# Patient Record
Sex: FEMALE | Race: WHITE | Employment: OTHER | URBAN - METROPOLITAN AREA
[De-identification: names, ages, dates, MRNs, and addresses within clinical notes are randomized per-mention and may not be internally consistent; named-entity substitution may affect disease eponyms.]

---

## 2017-02-16 RX ORDER — MONTELUKAST SODIUM 10 MG/1
TABLET ORAL
Qty: 90 TABLET | Refills: 0 | Status: SHIPPED | OUTPATIENT
Start: 2017-02-16 | End: 2017-05-01

## 2017-02-16 NOTE — TELEPHONE ENCOUNTER
Refill Protocol Appointment Criteria  · Appointment scheduled in the past 6 months or in the next 3 months  Recent Visits       Provider Department Primary Dx    3 months ago Regla Álvarez MD [facility], [facility], [address] [facility]

## 2017-03-02 ENCOUNTER — OFFICE VISIT (OUTPATIENT)
Dept: INTERNAL MEDICINE CLINIC | Facility: CLINIC | Age: 62
End: 2017-03-02

## 2017-03-02 VITALS
TEMPERATURE: 98 F | HEIGHT: 63 IN | HEART RATE: 66 BPM | SYSTOLIC BLOOD PRESSURE: 136 MMHG | DIASTOLIC BLOOD PRESSURE: 82 MMHG | BODY MASS INDEX: 29.23 KG/M2 | WEIGHT: 165 LBS

## 2017-03-02 DIAGNOSIS — J02.9 ACUTE PHARYNGITIS, UNSPECIFIED ETIOLOGY: Primary | ICD-10-CM

## 2017-03-02 LAB
CONTROL LINE PRESENT WITH A CLEAR BACKGROUND (YES/NO): YES YES/NO
KIT LOT #: NORMAL NUMERIC
STREP GRP A CUL-SCR: NEGATIVE

## 2017-03-02 PROCEDURE — 99213 OFFICE O/P EST LOW 20 MIN: CPT | Performed by: INTERNAL MEDICINE

## 2017-03-02 PROCEDURE — 87880 STREP A ASSAY W/OPTIC: CPT | Performed by: INTERNAL MEDICINE

## 2017-03-02 NOTE — PATIENT INSTRUCTIONS
Your Strep test today was negative. Treat symptoms with Tylenol and warm salt water gargles as needed. Call if not soon better.

## 2017-03-02 NOTE — PROGRESS NOTES
Tam Galloway is a 60year old female. Patient presents with:  Sore Throat    HPI:   Since yesterday, she has had a persistent sore throat. She just returned from a wedding in [place] yesterday, and wanted to come in to be checked.   She does have a Rash   Past Medical History   Diagnosis Date   • Asthma    • GERD (gastroesophageal reflux disease)    • S/P colonoscopy 11/11/2011   • Chickenpox    • Measles    • Mumps    • Breast cancer [facility]) 9/11/2007 9/19/07(right breast lumpectomy with sent

## 2017-03-29 ENCOUNTER — HOSPITAL ENCOUNTER (OUTPATIENT)
Dept: MAMMOGRAPHY | Facility: HOSPITAL | Age: 62
Discharge: HOME | End: 2017-03-29
Attending: INTERNAL MEDICINE
Payer: COMMERCIAL

## 2017-03-29 DIAGNOSIS — C50.911 MALIGNANT NEOPLASM OF RIGHT FEMALE BREAST, UNSPECIFIED SITE OF BREAST: ICD-10-CM

## 2017-03-29 PROCEDURE — 77062 BREAST TOMOSYNTHESIS BI: CPT | Performed by: RADIOLOGY

## 2017-03-29 PROCEDURE — 77066 DX MAMMO INCL CAD BI: CPT | Performed by: RADIOLOGY

## 2017-05-06 RX ORDER — PANTOPRAZOLE SODIUM 40 MG/1
TABLET, DELAYED RELEASE ORAL
Qty: 90 TABLET | Refills: 0 | Status: SHIPPED | OUTPATIENT
Start: 2017-05-06 | End: 2017-07-10

## 2017-05-06 RX ORDER — MONTELUKAST SODIUM 10 MG/1
TABLET ORAL
Qty: 90 TABLET | Refills: 0 | Status: SHIPPED | OUTPATIENT
Start: 2017-05-06 | End: 2017-07-10

## 2017-05-06 NOTE — TELEPHONE ENCOUNTER
Refill protocol criteria met, rx sent.       Refill Protocol Appointment Criteria  · Appointment scheduled in the past 6 months or in the next 3 months  Recent Visits       Provider Department Primary Dx    2 months ago Yazmin Rivers MD [facility], [facility]

## 2017-05-06 NOTE — TELEPHONE ENCOUNTER
Refill protocol criteria met, rx sent.       Refill Protocol Appointment Criteria  · Appointment scheduled in the past 12 months or in the next 3 months  Recent Visits       Provider Department Primary Dx    2 months ago Eugenio Rushing MD [facility], [facility],

## 2017-07-10 ENCOUNTER — TELEPHONE (OUTPATIENT)
Dept: HEMATOLOGY/ONCOLOGY | Facility: HOSPITAL | Age: 62
End: 2017-07-10

## 2017-07-10 NOTE — TELEPHONE ENCOUNTER
Hemant Rayo is transferring from [facility] to [place], but her lab orders . Can you please write a new orders.

## 2017-07-10 NOTE — TELEPHONE ENCOUNTER
Pt returned call and discussed if Dr. Marilee Michaels wants labs to be drawn they will be ordered at 8/31/17 appointment and can be drawn the day of the appointment. Pt verbalizes understanding.

## 2017-07-12 RX ORDER — PANTOPRAZOLE SODIUM 40 MG/1
TABLET, DELAYED RELEASE ORAL
Qty: 90 TABLET | Refills: 0 | Status: SHIPPED | OUTPATIENT
Start: 2017-07-12 | End: 2017-09-18

## 2017-07-12 RX ORDER — MONTELUKAST SODIUM 10 MG/1
TABLET ORAL
Qty: 90 TABLET | Refills: 0 | Status: SHIPPED | OUTPATIENT
Start: 2017-07-12 | End: 2017-09-18

## 2017-07-19 ENCOUNTER — TELEPHONE (OUTPATIENT)
Dept: INTERNAL MEDICINE CLINIC | Facility: CLINIC | Age: 62
End: 2017-07-19

## 2017-07-19 DIAGNOSIS — Z85.3 HX OF BREAST CANCER: Primary | ICD-10-CM

## 2017-07-19 DIAGNOSIS — C44.90 MALIGNANT NEOPLASM OF SKIN: ICD-10-CM

## 2017-07-19 NOTE — TELEPHONE ENCOUNTER
Referral for both Dr. Fidencio Linn and Dr. Amarilis Hope was generated and signed per last office visit of each stating the return for yearly exam. Pt was contacted, informed and understanding was voiced.

## 2017-07-19 NOTE — TELEPHONE ENCOUNTER
Pt is requesting referrals. Pt has appt on 08/31 wit Dr. Conchita Walker for yearly follow up for breast cancer. Pt has appt on 9/28 with Dr. Washington Talavera for a full body skin check.     Call back # [phone]

## 2017-08-26 ENCOUNTER — LAB ENCOUNTER (OUTPATIENT)
Dept: LAB | Facility: HOSPITAL | Age: 62
End: 2017-08-26
Attending: INTERNAL MEDICINE
Payer: COMMERCIAL

## 2017-08-26 DIAGNOSIS — J45.20 MILD INTERMITTENT ASTHMA WITHOUT COMPLICATION: ICD-10-CM

## 2017-08-26 DIAGNOSIS — E78.00 PURE HYPERCHOLESTEROLEMIA: ICD-10-CM

## 2017-08-26 LAB
ALBUMIN SERPL BCP-MCNC: 3.9 G/DL (ref 3.5–4.8)
ALBUMIN/GLOB SERPL: 1.3 {RATIO} (ref 1–2)
ALP SERPL-CCNC: 67 U/L (ref 32–100)
ALT SERPL-CCNC: 18 U/L (ref 14–54)
ANION GAP SERPL CALC-SCNC: 5 MMOL/L (ref 0–18)
AST SERPL-CCNC: 26 U/L (ref 15–41)
BASOPHILS # BLD: 0 K/UL (ref 0–0.2)
BASOPHILS NFR BLD: 1 %
BILIRUB SERPL-MCNC: 0.7 MG/DL (ref 0.3–1.2)
BILIRUB UR QL: NEGATIVE
BUN SERPL-MCNC: 16 MG/DL (ref 8–20)
BUN/CREAT SERPL: 20.3 (ref 10–20)
CALCIUM SERPL-MCNC: 9.3 MG/DL (ref 8.5–10.5)
CHLORIDE SERPL-SCNC: 108 MMOL/L (ref 95–110)
CHOLEST SERPL-MCNC: 217 MG/DL (ref 110–200)
CO2 SERPL-SCNC: 27 MMOL/L (ref 22–32)
COLOR UR: YELLOW
CREAT SERPL-MCNC: 0.79 MG/DL (ref 0.5–1.5)
EOSINOPHIL # BLD: 0.2 K/UL (ref 0–0.7)
EOSINOPHIL NFR BLD: 4 %
ERYTHROCYTE [DISTWIDTH] IN BLOOD BY AUTOMATED COUNT: 13 % (ref 11–15)
GLOBULIN PLAS-MCNC: 3 G/DL (ref 2.5–3.7)
GLUCOSE SERPL-MCNC: 103 MG/DL (ref 70–99)
GLUCOSE UR-MCNC: NEGATIVE MG/DL
HCT VFR BLD AUTO: 38.9 % (ref 35–48)
HDLC SERPL-MCNC: 75 MG/DL
HGB BLD-MCNC: 12.9 G/DL (ref 12–16)
HGB UR QL STRIP.AUTO: NEGATIVE
KETONES UR-MCNC: NEGATIVE MG/DL
LDLC SERPL CALC-MCNC: 124 MG/DL (ref 0–99)
LYMPHOCYTES # BLD: 1.7 K/UL (ref 1–4)
LYMPHOCYTES NFR BLD: 35 %
MCH RBC QN AUTO: 31 PG (ref 27–32)
MCHC RBC AUTO-ENTMCNC: 33.2 G/DL (ref 32–37)
MCV RBC AUTO: 93.4 FL (ref 80–100)
MONOCYTES # BLD: 0.4 K/UL (ref 0–1)
MONOCYTES NFR BLD: 8 %
NEUTROPHILS # BLD AUTO: 2.5 K/UL (ref 1.8–7.7)
NEUTROPHILS NFR BLD: 51 %
NITRITE UR QL STRIP.AUTO: NEGATIVE
NONHDLC SERPL-MCNC: 142 MG/DL
OSMOLALITY UR CALC.SUM OF ELEC: 291 MOSM/KG (ref 275–295)
PH UR: 5 [PH] (ref 5–8)
PLATELET # BLD AUTO: 214 K/UL (ref 140–400)
PMV BLD AUTO: 10.6 FL (ref 7.4–10.3)
POTASSIUM SERPL-SCNC: 4.3 MMOL/L (ref 3.3–5.1)
PROT SERPL-MCNC: 6.9 G/DL (ref 5.9–8.4)
PROT UR-MCNC: NEGATIVE MG/DL
RBC # BLD AUTO: 4.16 M/UL (ref 3.7–5.4)
RBC #/AREA URNS AUTO: 8 /HPF
SODIUM SERPL-SCNC: 140 MMOL/L (ref 136–144)
SP GR UR STRIP: 1.02 (ref 1–1.03)
TRIGL SERPL-MCNC: 88 MG/DL (ref 1–149)
TSH SERPL-ACNC: 1.16 UIU/ML (ref 0.45–5.33)
UROBILINOGEN UR STRIP-ACNC: <2
VIT C UR-MCNC: NEGATIVE MG/DL
WBC # BLD AUTO: 4.9 K/UL (ref 4–11)
WBC #/AREA URNS AUTO: 75 /HPF

## 2017-08-26 PROCEDURE — 80061 LIPID PANEL: CPT

## 2017-08-26 PROCEDURE — 36415 COLL VENOUS BLD VENIPUNCTURE: CPT

## 2017-08-26 PROCEDURE — 85025 COMPLETE CBC W/AUTO DIFF WBC: CPT

## 2017-08-26 PROCEDURE — 84443 ASSAY THYROID STIM HORMONE: CPT

## 2017-08-26 PROCEDURE — 81001 URINALYSIS AUTO W/SCOPE: CPT

## 2017-08-26 PROCEDURE — 80053 COMPREHEN METABOLIC PANEL: CPT

## 2017-08-31 ENCOUNTER — OFFICE VISIT (OUTPATIENT)
Dept: HEMATOLOGY/ONCOLOGY | Facility: HOSPITAL | Age: 62
End: 2017-08-31
Attending: INTERNAL MEDICINE
Payer: COMMERCIAL

## 2017-08-31 VITALS
BODY MASS INDEX: 29.41 KG/M2 | HEIGHT: 63 IN | WEIGHT: 166 LBS | SYSTOLIC BLOOD PRESSURE: 140 MMHG | RESPIRATION RATE: 16 BRPM | TEMPERATURE: 98 F | HEART RATE: 69 BPM | DIASTOLIC BLOOD PRESSURE: 61 MMHG

## 2017-08-31 DIAGNOSIS — Z12.31 SCREENING MAMMOGRAM, ENCOUNTER FOR: ICD-10-CM

## 2017-08-31 DIAGNOSIS — R92.2 DENSE BREASTS: ICD-10-CM

## 2017-08-31 DIAGNOSIS — Z17.1 MALIGNANT NEOPLASM OF RIGHT BREAST IN FEMALE, ESTROGEN RECEPTOR NEGATIVE, UNSPECIFIED SITE OF BREAST (HCC): Primary | ICD-10-CM

## 2017-08-31 DIAGNOSIS — C50.911 MALIGNANT NEOPLASM OF RIGHT BREAST IN FEMALE, ESTROGEN RECEPTOR NEGATIVE, UNSPECIFIED SITE OF BREAST (HCC): Primary | ICD-10-CM

## 2017-08-31 PROBLEM — R92.30 DENSE BREASTS: Status: ACTIVE | Noted: 2017-08-31

## 2017-08-31 PROCEDURE — 99212 OFFICE O/P EST SF 10 MIN: CPT | Performed by: INTERNAL MEDICINE

## 2017-08-31 PROCEDURE — 99213 OFFICE O/P EST LOW 20 MIN: CPT | Performed by: INTERNAL MEDICINE

## 2017-08-31 NOTE — PROGRESS NOTES
HPI     Hao Kirkpatrick is a 60year old female here for f/u of Malignant neoplasm of right breast in female, estrogen receptor negative, unspecified site of breast (hcc)  (primary encounter diagnosis)     States she feels well other than seasonal allergi Psychiatric/Behavioral: Negative for sleep disturbance. The patient is not nervous/anxious. Negative depression.              Current Outpatient Prescriptions:  PANTOPRAZOLE SODIUM 40 MG Oral Tab EC Take 1 tablet by mouth  every morning before  elizabeth 2007: BIOPSY OF BREAST, NEEDLE CORE Right      Comment: 07 (core biopsy) Poorly differentiated                infiltrating ductal carcinoma R breast (triple                negative, Ki   67,  86%)   No date:   2008: CHEMOTHERAPY      Com 10/27/16 : 73.9 kg (163 lb)  09/03/16 : 72.8 kg (160 lb 9.6 oz)  08/23/16 : 72.6 kg (160 lb)  09/23/15 : 71.2 kg (157 lb)    Physical Exam   Constitutional: She is oriented to person, place, and time.  Vital signs are normal. She appears well-developed and Neurological: She is alert and oriented to person, place, and time. Gait normal.   Skin: Skin is warm. No rash noted. No erythema. Psychiatric: She has a normal mood and affect.  Her behavior is normal. Judgment and thought content normal.   Nursing TECHNIQUE:              Full-field direct digital diagnostic mammography was performed and images were reviewed with the R2 NATHAN [de-identified] CAD device. 3D tomosynthesis was performed and reviewed.       BREAST COMPOSITION:                     CATEGORY c - The b markers locate right upper outer quadrant lumpectomy and lymph node  dissection sites.     Parenchymal pattern is stable.  There is post treatment architectural  distortion in the right upper-outer quadrant, with overlying breast  deformity and skin thicken

## 2017-09-06 ENCOUNTER — OFFICE VISIT (OUTPATIENT)
Dept: INTERNAL MEDICINE CLINIC | Facility: CLINIC | Age: 62
End: 2017-09-06

## 2017-09-06 VITALS
BODY MASS INDEX: 29.77 KG/M2 | RESPIRATION RATE: 20 BRPM | DIASTOLIC BLOOD PRESSURE: 78 MMHG | SYSTOLIC BLOOD PRESSURE: 138 MMHG | TEMPERATURE: 98 F | WEIGHT: 168 LBS | HEIGHT: 63 IN | HEART RATE: 63 BPM

## 2017-09-06 DIAGNOSIS — L08.9 INFECTED SEBACEOUS CYST: Primary | ICD-10-CM

## 2017-09-06 DIAGNOSIS — L72.3 INFECTED SEBACEOUS CYST: Primary | ICD-10-CM

## 2017-09-06 PROCEDURE — 99214 OFFICE O/P EST MOD 30 MIN: CPT | Performed by: INTERNAL MEDICINE

## 2017-09-06 PROCEDURE — 99212 OFFICE O/P EST SF 10 MIN: CPT | Performed by: INTERNAL MEDICINE

## 2017-09-06 RX ORDER — CLINDAMYCIN HYDROCHLORIDE 300 MG/1
300 CAPSULE ORAL 3 TIMES DAILY
Qty: 21 CAPSULE | Refills: 0 | Status: SHIPPED | OUTPATIENT
Start: 2017-09-06 | End: 2017-09-07

## 2017-09-06 RX ORDER — SACCHAROMYCES BOULARDII 250 MG
250 CAPSULE ORAL 2 TIMES DAILY
Qty: 14 CAPSULE | Refills: 0 | Status: SHIPPED | OUTPATIENT
Start: 2017-09-06 | End: 2017-09-18

## 2017-09-06 NOTE — PROGRESS NOTES
HPI:    Patient ID: Sukh Garcia is a 60year old female. Skin   This is a new (lump in  posterior right  neck  area ) problem. Episode onset: few  weeks   now   tender  and     red  -  The problem occurs daily.  The problem has been gradually worsen MONTELUKAST SODIUM 10 MG Oral Tab Take 1 tablet by mouth  daily Disp: 90 tablet Rfl: 0   Albuterol Sulfate HFA (PROAIR HFA) 108 (90 BASE) MCG/ACT Inhalation Aero Soln Inhale 2 puffs into the lungs every 4 (four) hours as needed for Wheezing.  Disp: 1 Inhale Pulmonary/Chest: Effort normal and breath sounds normal. No respiratory distress. She has no wheezes. She has no rales. Abdominal: Soft. She exhibits no mass. There is no hepatosplenomegaly. There is no tenderness. There is no CVA tenderness.    [address]

## 2017-09-07 ENCOUNTER — TELEPHONE (OUTPATIENT)
Dept: OTHER | Age: 62
End: 2017-09-07

## 2017-09-07 RX ORDER — AMOXICILLIN AND CLAVULANATE POTASSIUM 875; 125 MG/1; MG/1
1 TABLET, FILM COATED ORAL 2 TIMES DAILY
Qty: 20 TABLET | Refills: 0 | Status: SHIPPED | OUTPATIENT
Start: 2017-09-07 | End: 2017-09-17

## 2017-09-07 NOTE — TELEPHONE ENCOUNTER
Pt stts was given Clindamycin for cyst yesterday. Took 1 dose but  has not felt well after taking medication. Pt has increased belching and nausea. Pt wondering if you can change to something else please.

## 2017-09-07 NOTE — TELEPHONE ENCOUNTER
discussed with pt  clinda   Causing stomach issues  Belching  Nausea     -   Antibiotic  Changed  -  amoxicillin bid - with   Some food   - pt  State  Was  Taking in past  Without  Any problems - tolerated well in past .  Fluids   Call if  Any problems

## 2017-09-11 RX ORDER — PANTOPRAZOLE SODIUM 40 MG/1
TABLET, DELAYED RELEASE ORAL
Qty: 90 TABLET | OUTPATIENT
Start: 2017-09-11

## 2017-09-11 RX ORDER — MONTELUKAST SODIUM 10 MG/1
TABLET ORAL
Qty: 90 TABLET | OUTPATIENT
Start: 2017-09-11

## 2017-09-18 ENCOUNTER — OFFICE VISIT (OUTPATIENT)
Dept: INTERNAL MEDICINE CLINIC | Facility: CLINIC | Age: 62
End: 2017-09-18

## 2017-09-18 VITALS
SYSTOLIC BLOOD PRESSURE: 128 MMHG | DIASTOLIC BLOOD PRESSURE: 83 MMHG | HEIGHT: 63 IN | RESPIRATION RATE: 20 BRPM | BODY MASS INDEX: 29.06 KG/M2 | WEIGHT: 164 LBS | TEMPERATURE: 98 F | HEART RATE: 66 BPM

## 2017-09-18 DIAGNOSIS — L72.3 INFECTED SEBACEOUS CYST: ICD-10-CM

## 2017-09-18 DIAGNOSIS — L08.9 INFECTED SEBACEOUS CYST: ICD-10-CM

## 2017-09-18 DIAGNOSIS — E55.9 VITAMIN D DEFICIENCY: Primary | ICD-10-CM

## 2017-09-18 PROCEDURE — 99212 OFFICE O/P EST SF 10 MIN: CPT | Performed by: INTERNAL MEDICINE

## 2017-09-18 PROCEDURE — 99214 OFFICE O/P EST MOD 30 MIN: CPT | Performed by: INTERNAL MEDICINE

## 2017-09-18 RX ORDER — PANTOPRAZOLE SODIUM 40 MG/1
TABLET, DELAYED RELEASE ORAL
Qty: 90 TABLET | Refills: 1 | Status: SHIPPED | OUTPATIENT
Start: 2017-09-18 | End: 2018-03-27

## 2017-09-18 RX ORDER — MONTELUKAST SODIUM 10 MG/1
10 TABLET ORAL
Qty: 90 TABLET | Refills: 1 | Status: SHIPPED | OUTPATIENT
Start: 2017-09-18 | End: 2018-03-29

## 2017-09-18 NOTE — PROGRESS NOTES
HPI:    Patient ID: Kaleigh Armando is a 60year old female. Medication Request   This is a new (small infected lump  in neck srea  is  much  better  and  smaller  ) problem. The current episode started in the past 7 days.  The problem has been graduall Calcium Carbonate-Vitamin D (CALCIUM 500/D) 500-200 MG-UNIT Oral Tab Take 1 tablet by mouth daily. Disp:  Rfl:    Vitamin D3 (VITAMIN D3) 2000 UNITS Oral Cap Take 2,000 Units by mouth daily.    Disp:  Rfl:    Olopatadine HCl (PATADAY) 0.2 % Ophthalmic Sol Neurological: She is alert and oriented to person, place, and time. Skin: Skin is warm and dry.         Small  3 mm  Now Round  Soft  - cystic  typel lesion -  occipital  Area - No  Erythema  No tenerenss  No discharge-  r Post  Upper  Neck    Psychiatric

## 2017-09-28 ENCOUNTER — OFFICE VISIT (OUTPATIENT)
Dept: DERMATOLOGY CLINIC | Facility: CLINIC | Age: 62
End: 2017-09-28

## 2017-09-28 DIAGNOSIS — D23.60 BENIGN NEOPLASM OF SKIN OF UPPER LIMB, INCLUDING SHOULDER, UNSPECIFIED LATERALITY: ICD-10-CM

## 2017-09-28 DIAGNOSIS — D23.4 BENIGN NEOPLASM OF SCALP AND SKIN OF NECK: ICD-10-CM

## 2017-09-28 DIAGNOSIS — D23.30 BENIGN NEOPLASM OF SKIN OF FACE: ICD-10-CM

## 2017-09-28 DIAGNOSIS — L81.4 SOLAR LENTIGO: ICD-10-CM

## 2017-09-28 DIAGNOSIS — D23.70 BENIGN NEOPLASM OF SKIN OF LOWER LIMB, INCLUDING HIP, UNSPECIFIED LATERALITY: ICD-10-CM

## 2017-09-28 DIAGNOSIS — D23.5 BENIGN NEOPLASM OF SKIN OF TRUNK, EXCEPT SCROTUM: ICD-10-CM

## 2017-09-28 DIAGNOSIS — L82.1 SEBORRHEIC KERATOSES: ICD-10-CM

## 2017-09-28 DIAGNOSIS — Z85.828 PERSONAL HISTORY OF SKIN CANCER: Primary | ICD-10-CM

## 2017-09-28 PROCEDURE — 99213 OFFICE O/P EST LOW 20 MIN: CPT | Performed by: DERMATOLOGY

## 2017-09-28 PROCEDURE — 99212 OFFICE O/P EST SF 10 MIN: CPT | Performed by: DERMATOLOGY

## 2017-09-28 RX ORDER — CETIRIZINE HYDROCHLORIDE 5 MG/1
10 TABLET ORAL DAILY
COMMUNITY
End: 2021-12-13 | Stop reason: ALTCHOICE

## 2017-09-28 NOTE — PROGRESS NOTES
HPI:     Chief Complaint     Lesion        HPI     Lesion    Additional comments: Last visit to derm was 13 months prior. Pt presents today with concern of multiple lesions throughout body and is due for full body skin evaluation due to hx of BCC.   Pt has Rash  Cephalosporins          Itching  Duricef [Cefadroxil]      Hydrocodone             Itching  Methocarbamol           Rash  Sulfanilamide           Rash    Past Medical History:   Diagnosis Date   • Allergic rhinitis    • Asthma    • Basal cell c Asthma Mother    • Breast Cancer Mother 76   • Other [OTHER] Mother    • copd [OTHER] Mother      cause of death- passed at age 80   • Alcohol and Other Disorders Associated Other      alcoholism grandmother   • Skin cancer Sister      Basal cell carcinoma previous visit (from the past 48 hour(s)). Meds This Visit:      Imaging Orders:  None     Referral Orders:  No orders of the defined types were placed in this encounter.         9/28/2017  Samuel Mcleod

## 2017-09-28 NOTE — PROGRESS NOTES
Past Medical History:   Diagnosis Date   • Allergic rhinitis    • Asthma    • Basal cell carcinoma 2002    excision- back   • [facility] 9/11/2007 9/19/07(right breast lumpectomy with sentinel LN biopsy) 2.0 cm infiltrating poorly differentiate Associated Other      alcoholism grandmother   • Skin cancer Sister      Basal cell carcinoma-chest   • Breast Cancer Self          Adhesive Tape           Rash  Cephalosporins          Itching  Duricef [Cefadroxil]      Hydrocodone             Itching  Me

## 2018-03-28 RX ORDER — PANTOPRAZOLE SODIUM 40 MG/1
TABLET, DELAYED RELEASE ORAL
Qty: 90 TABLET | Refills: 0 | Status: SHIPPED | OUTPATIENT
Start: 2018-03-28 | End: 2018-06-03

## 2018-03-28 NOTE — TELEPHONE ENCOUNTER
Protonix met protocol - singulair did not  LOV 9/18/17 (missed 6 month cutoff by 10 days)    Please advise

## 2018-03-28 NOTE — TELEPHONE ENCOUNTER
Refill Protocol Appointment Criteria  · Appointment scheduled in the past 12 months or in the next 3 months  Recent Outpatient Visits            6 months ago Personal history of skin cancer    [facility] Noa Baron MD    [facility]

## 2018-03-29 NOTE — TELEPHONE ENCOUNTER
Current Outpatient Prescriptions:   •  Montelukast Sodium 10 MG Oral Tab, Take 1 tablet (10 mg total) by mouth once daily. , Disp: 90 tablet, Rfl: 1

## 2018-03-30 RX ORDER — MONTELUKAST SODIUM 10 MG/1
10 TABLET ORAL
Qty: 90 TABLET | Refills: 1 | Status: SHIPPED | OUTPATIENT
Start: 2018-03-30 | End: 2019-02-18

## 2018-03-30 NOTE — TELEPHONE ENCOUNTER
Refill protocol failed because the patient did not meet the protocol criteria.  Please advise in regards to refill request     Refill Protocol Appointment Criteria  · Appointment scheduled in the past 6 months or in the next 3 months  Recent Outpatient Visi

## 2018-04-05 ENCOUNTER — HOSPITAL ENCOUNTER (OUTPATIENT)
Dept: MAMMOGRAPHY | Facility: HOSPITAL | Age: 63
Discharge: HOME | End: 2018-04-05
Attending: INTERNAL MEDICINE
Payer: COMMERCIAL

## 2018-04-05 DIAGNOSIS — Z12.31 SCREENING MAMMOGRAM, ENCOUNTER FOR: ICD-10-CM

## 2018-04-05 DIAGNOSIS — R92.2 DENSE BREASTS: ICD-10-CM

## 2018-04-05 PROCEDURE — 77067 SCR MAMMO BI INCL CAD: CPT | Performed by: INTERNAL MEDICINE

## 2018-04-05 PROCEDURE — 77063 BREAST TOMOSYNTHESIS BI: CPT | Performed by: INTERNAL MEDICINE

## 2018-04-05 RX ORDER — MONTELUKAST SODIUM 10 MG/1
TABLET ORAL
Qty: 90 TABLET | Refills: 0 | Status: SHIPPED | OUTPATIENT
Start: 2018-04-05 | End: 2018-06-03

## 2018-06-05 NOTE — TELEPHONE ENCOUNTER
Pantoprazole Medication refilled for 90 days per protocol.   Refill Protocol Appointment Criteria  · Appointment scheduled in the past 12 months or in the next 3 months  Recent Outpatient Visits            8 months ago Personal history of skin cancer    [facility]

## 2018-06-07 RX ORDER — MONTELUKAST SODIUM 10 MG/1
TABLET ORAL
Qty: 90 TABLET | Refills: 0 | Status: SHIPPED | OUTPATIENT
Start: 2018-06-07 | End: 2019-02-08

## 2018-06-07 RX ORDER — PANTOPRAZOLE SODIUM 40 MG/1
TABLET, DELAYED RELEASE ORAL
Qty: 90 TABLET | Refills: 0 | Status: SHIPPED | OUTPATIENT
Start: 2018-06-07 | End: 2019-02-20

## 2018-08-27 ENCOUNTER — TELEPHONE (OUTPATIENT)
Dept: INTERNAL MEDICINE CLINIC | Facility: CLINIC | Age: 63
End: 2018-08-27

## 2018-08-27 DIAGNOSIS — C50.919 MALIGNANT NEOPLASM OF FEMALE BREAST, UNSPECIFIED ESTROGEN RECEPTOR STATUS, UNSPECIFIED LATERALITY, UNSPECIFIED SITE OF BREAST (HCC): ICD-10-CM

## 2018-08-27 DIAGNOSIS — C43.9 SKIN CANCER (MELANOMA) (HCC): Primary | ICD-10-CM

## 2018-08-27 NOTE — TELEPHONE ENCOUNTER
Rufino Buckley,    Patient needs to see Uyen Wynn for annual visits. Please sign referral for processing.      Thank you,   [address]

## 2018-08-30 ENCOUNTER — OFFICE VISIT (OUTPATIENT)
Dept: HEMATOLOGY/ONCOLOGY | Facility: HOSPITAL | Age: 63
End: 2018-08-30
Attending: INTERNAL MEDICINE
Payer: COMMERCIAL

## 2018-08-30 ENCOUNTER — OFFICE VISIT (OUTPATIENT)
Dept: DERMATOLOGY CLINIC | Facility: CLINIC | Age: 63
End: 2018-08-30
Payer: COMMERCIAL

## 2018-08-30 VITALS
BODY MASS INDEX: 29.95 KG/M2 | RESPIRATION RATE: 18 BRPM | HEART RATE: 58 BPM | WEIGHT: 169 LBS | SYSTOLIC BLOOD PRESSURE: 123 MMHG | HEIGHT: 63 IN | DIASTOLIC BLOOD PRESSURE: 60 MMHG | TEMPERATURE: 98 F

## 2018-08-30 DIAGNOSIS — Z12.31 SCREENING MAMMOGRAM, ENCOUNTER FOR: ICD-10-CM

## 2018-08-30 DIAGNOSIS — L81.4 SOLAR LENTIGO: ICD-10-CM

## 2018-08-30 DIAGNOSIS — D23.30 BENIGN NEOPLASM OF SKIN OF FACE: ICD-10-CM

## 2018-08-30 DIAGNOSIS — Z17.1 MALIGNANT NEOPLASM OF RIGHT BREAST IN FEMALE, ESTROGEN RECEPTOR NEGATIVE, UNSPECIFIED SITE OF BREAST (HCC): Primary | ICD-10-CM

## 2018-08-30 DIAGNOSIS — Z85.828 PERSONAL HISTORY OF SKIN CANCER: ICD-10-CM

## 2018-08-30 DIAGNOSIS — L82.1 SEBORRHEIC KERATOSES: ICD-10-CM

## 2018-08-30 DIAGNOSIS — C50.911 MALIGNANT NEOPLASM OF RIGHT BREAST IN FEMALE, ESTROGEN RECEPTOR NEGATIVE, UNSPECIFIED SITE OF BREAST (HCC): Primary | ICD-10-CM

## 2018-08-30 DIAGNOSIS — D23.60 BENIGN NEOPLASM OF SKIN OF UPPER LIMB, INCLUDING SHOULDER, UNSPECIFIED LATERALITY: ICD-10-CM

## 2018-08-30 DIAGNOSIS — D23.70 BENIGN NEOPLASM OF SKIN OF LOWER LIMB, INCLUDING HIP, UNSPECIFIED LATERALITY: ICD-10-CM

## 2018-08-30 DIAGNOSIS — D23.4 BENIGN NEOPLASM OF SCALP AND SKIN OF NECK: ICD-10-CM

## 2018-08-30 DIAGNOSIS — L57.0 ACTINIC KERATOSIS: Primary | ICD-10-CM

## 2018-08-30 DIAGNOSIS — D23.5 BENIGN NEOPLASM OF SKIN OF TRUNK, EXCEPT SCROTUM: ICD-10-CM

## 2018-08-30 PROCEDURE — 99213 OFFICE O/P EST LOW 20 MIN: CPT | Performed by: DERMATOLOGY

## 2018-08-30 PROCEDURE — 99213 OFFICE O/P EST LOW 20 MIN: CPT | Performed by: INTERNAL MEDICINE

## 2018-08-30 PROCEDURE — 99212 OFFICE O/P EST SF 10 MIN: CPT | Performed by: DERMATOLOGY

## 2018-08-30 PROCEDURE — 17000 DESTRUCT PREMALG LESION: CPT | Performed by: DERMATOLOGY

## 2018-08-30 NOTE — PROGRESS NOTES
HPI     Danial Mike is a 58year old female here for f/u of Malignant neoplasm of right breast in female, estrogen receptor negative, unspecified site of breast (hcc)  (primary encounter diagnosis)     States she feels well other than seasonal allergi MONTELUKAST SODIUM 10 MG Oral Tab TAKE 1 TABLET BY MOUTH ONCE DAILY Disp: 90 tablet Rfl: 0   PANTOPRAZOLE SODIUM 40 MG Oral Tab EC TAKE 1 TABLET BY MOUTH  EVERY MORNING BEFORE  BREAKFAST Disp: 90 tablet Rfl: 0   Montelukast Sodium 10 MG Oral Tab Take 1 tab 2008: CHEMOTHERAPY      Comment: right breast  2011: COLONOSCOPY      Comment: with biopsy  2007: LUMPECTOMY RIGHT      Comment: and multiple sentinel lymph node excisions  2008: RADIATION RIGHT      Comment: right breast    Social History  Social History Constitutional: She is oriented to person, place, and time. Vital signs are normal. She appears well-developed and well-nourished. No distress. HENT:   Head: Normocephalic and atraumatic.    Right Ear: External ear normal.   Left Ear: External ear normal. Psychiatric: She has a normal mood and affect. Her behavior is normal. Judgment and thought content normal.   Nursing note and vitals reviewed.           ASSESSMENT/PLAN:   Malignant neoplasm of right breast in female, estrogen receptor negative, unspecifie The patient presents for annual screening mammography. The patient has a personal history of right breast cancer treated with right lumpectomy 2007 with chemotherapy and radiation therapy.  The patient has a personal history of basal cell carcinoma of the Patient received a discharge summary from the technologist after completion of exam.       Breast marker legend used on images    Triangle = Palpable lump  Viejas = Skin tag or mole  BB = Nipple  Linear vivek = Scar   Square = Pain               Dictated by

## 2018-08-30 NOTE — PROGRESS NOTES
Past Medical History:   Diagnosis Date   • Allergic rhinitis    • Asthma    • Basal cell carcinoma 2002    excision- back   • [facility]2007 9/19/07(right breast lumpectomy with sentinel LN biopsy) 2.0 cm infiltrating poorly differentiate Associated Other      alcoholism grandmother   • Skin cancer Sister      Basal cell carcinoma-chest   • Breast Cancer Self          Adhesive Tape           RASH  Cephalosporins          ITCHING  Duricef [Cefadroxil]      Hydrocodone             ITCHING  Me

## 2018-08-30 NOTE — PROGRESS NOTES
HPI:     Chief Complaint     Full Skin Exam        HPI     Full Skin Exam    Additional comments: LOV: 09/28/17. Pt presents for a full skin exam. Pt has personal hx of BCC.         Last edited by Tamara Santana on 8/30/2018  1:34 PM. (History)        P Oral Cap Take 2,000 Units by mouth daily. Disp:  Rfl:    Olopatadine HCl (PATADAY) 0.2 % Ophthalmic Solution 1 drop by Each eye route daily.    Disp:  Rfl:      Allergies:     Adhesive Tape           RASH  Cephalosporins          ITCHING  Geno Rosales Father      hyperlipideia   • Heart Disease Father      CAD   • Stroke Father      TIAs   • Dementia Father    • Heart Attack Father      myocardial infarction   • Thyroid disease Mother    • Asthma Mother    • Breast Cancer Mother 76   • Other Marika Alba Mot and working and to reapply it every few hours. Benign neoplasm of skin of trunk, except scrotum-ABCDE's of melanoma discussed. the patient is to follow up yearly. The patient will come sooner should they note  anything new or changing.   Proper sunblock

## 2019-02-08 ENCOUNTER — OFFICE VISIT (OUTPATIENT)
Dept: INTERNAL MEDICINE CLINIC | Facility: CLINIC | Age: 64
End: 2019-02-08
Payer: COMMERCIAL

## 2019-02-08 ENCOUNTER — OFFICE VISIT (OUTPATIENT)
Dept: OBGYN CLINIC | Facility: CLINIC | Age: 64
End: 2019-02-08
Payer: COMMERCIAL

## 2019-02-08 VITALS
WEIGHT: 167.63 LBS | SYSTOLIC BLOOD PRESSURE: 142 MMHG | HEIGHT: 64 IN | HEART RATE: 69 BPM | BODY MASS INDEX: 28.62 KG/M2 | DIASTOLIC BLOOD PRESSURE: 83 MMHG

## 2019-02-08 VITALS
DIASTOLIC BLOOD PRESSURE: 83 MMHG | WEIGHT: 168 LBS | BODY MASS INDEX: 28.68 KG/M2 | HEIGHT: 64 IN | SYSTOLIC BLOOD PRESSURE: 137 MMHG | HEART RATE: 69 BPM

## 2019-02-08 DIAGNOSIS — Z12.4 SCREENING FOR MALIGNANT NEOPLASM OF CERVIX: ICD-10-CM

## 2019-02-08 DIAGNOSIS — Z85.3 HISTORY OF BREAST CANCER: ICD-10-CM

## 2019-02-08 DIAGNOSIS — Z00.00 PHYSICAL EXAM: Primary | ICD-10-CM

## 2019-02-08 DIAGNOSIS — Z23 NEED FOR VACCINATION: ICD-10-CM

## 2019-02-08 DIAGNOSIS — J30.9 ALLERGIC RHINITIS, UNSPECIFIED SEASONALITY, UNSPECIFIED TRIGGER: ICD-10-CM

## 2019-02-08 DIAGNOSIS — C50.911 MALIGNANT NEOPLASM OF RIGHT BREAST IN FEMALE, ESTROGEN RECEPTOR NEGATIVE, UNSPECIFIED SITE OF BREAST (HCC): ICD-10-CM

## 2019-02-08 DIAGNOSIS — J45.20 MILD INTERMITTENT ASTHMA WITHOUT COMPLICATION: ICD-10-CM

## 2019-02-08 DIAGNOSIS — Z17.1 MALIGNANT NEOPLASM OF RIGHT BREAST IN FEMALE, ESTROGEN RECEPTOR NEGATIVE, UNSPECIFIED SITE OF BREAST (HCC): ICD-10-CM

## 2019-02-08 DIAGNOSIS — E78.00 PURE HYPERCHOLESTEROLEMIA: ICD-10-CM

## 2019-02-08 DIAGNOSIS — Z01.419 ENCOUNTER FOR GYNECOLOGICAL EXAMINATION: Primary | ICD-10-CM

## 2019-02-08 DIAGNOSIS — K21.9 GASTROESOPHAGEAL REFLUX DISEASE, ESOPHAGITIS PRESENCE NOT SPECIFIED: ICD-10-CM

## 2019-02-08 PROCEDURE — 99396 PREV VISIT EST AGE 40-64: CPT | Performed by: OBSTETRICS & GYNECOLOGY

## 2019-02-08 PROCEDURE — 99396 PREV VISIT EST AGE 40-64: CPT | Performed by: INTERNAL MEDICINE

## 2019-02-08 PROCEDURE — 90715 TDAP VACCINE 7 YRS/> IM: CPT | Performed by: INTERNAL MEDICINE

## 2019-02-08 PROCEDURE — 90471 IMMUNIZATION ADMIN: CPT | Performed by: INTERNAL MEDICINE

## 2019-02-08 NOTE — PATIENT INSTRUCTIONS
Prevention Guidelines, Women Ages 48 to 59  Screening tests and vaccines are an important part of managing your health. A screening test is done to find possible disorders or diseases in people who don't have any symptoms.  The goal is to find a disease e Chlamydia Women at increased risk for infection At routine exams   Colorectal cancer All women in this age group Flexible sigmoidoscopy every 5 years, or colonoscopy every 10 years, or double-contrast barium enema every 5 years; yearly fecal occult blood t Hepatitis B Women at increased risk for infection – talk with your healthcare provider 3 doses over 6 months; second dose should be given 1 month after the first dose; the third dose should be given at least 2 months after the second dose and at least 4 mo Use of daily aspirin Women ages 54 and up in this age group who are at risk for cardiovascular health problems such as stroke When your risk is known   Use of tobacco and the health effects it can cause All women in this age group Every exam   [address]

## 2019-02-08 NOTE — PROGRESS NOTES
Keturah Gil is a 58year old female.   Patient presents with:  Physical      HPI:   Pt comes for physical   C/c physical   C/o needs tdap and wants flu shot too- has to get both in the samw arm-- right due to h/o bresat cancer on the right   tdap bc sh Measles    • Mumps    • S/P colonoscopy 11/11/2011      Past Surgical History:   Procedure Laterality Date   • Biopsy of breast, needle core Right 9/11/2007 9/11/07 (core biopsy) Poorly differentiated infiltrating ductal carcinoma R breast (triple negat auscultation, no wheeze  CARDIO: RRR without murmur  GI: good BS's,no masses or tenderness  EXTREMITIES: no cyanosis, or edema  Breast exam done by GYN this morning  ASSESSMENT AND PLAN:   Diagnoses and all orders for this visit:    Physical exam  -     CO

## 2019-02-09 NOTE — PROGRESS NOTES
Leighton Pascual is a 58year old female  No LMP recorded. Patient is postmenopausal. here for annual exam.         Last seen 9/3/16. Last pap 2016 normal with neg HPV. History of breast cancer in . Last mammogram 2018.   Sees Dr Kishore Beal Father         CAD   • Stroke Father         TIAs   • Dementia Father    • Heart Attack Father         myocardial infarction   • Thyroid disease Mother    • Asthma Mother    • Breast Cancer Mother 76   • Other (Other) Mother    • Other (copd) Mother breath  Gastrointestinal:  denies heartburn, abdominal pain, diarrhea or constipation  Genitourinary:  denies dysuria, incontinence, abnormal vaginal discharge, vaginal itching  Musculoskeletal:  denies back pain.   Skin/Breast:  Denies any breast pain, lum ordered in this encounter         Yuly Costa MD  2/9/2019  9:11 AM

## 2019-02-10 LAB — HPV I/H RISK 1 DNA SPEC QL NAA+PROBE: NEGATIVE

## 2019-02-18 RX ORDER — MONTELUKAST SODIUM 10 MG/1
TABLET ORAL
Qty: 90 TABLET | Refills: 0 | Status: SHIPPED | OUTPATIENT
Start: 2019-02-18 | End: 2019-04-29

## 2019-02-20 RX ORDER — PANTOPRAZOLE SODIUM 40 MG/1
TABLET, DELAYED RELEASE ORAL
Qty: 90 TABLET | Refills: 0 | Status: SHIPPED | OUTPATIENT
Start: 2019-02-20 | End: 2019-04-29

## 2019-02-20 NOTE — TELEPHONE ENCOUNTER
Current Outpatient Medications:   •  PANTOPRAZOLE SODIUM 40 MG Oral Tab EC, TAKE 1 TABLET BY MOUTH  EVERY MORNING BEFORE  BREAKFAST, Disp: 90 tablet, Rfl: 0

## 2019-02-21 NOTE — TELEPHONE ENCOUNTER
Refill passed per [address] [place] protocol.   Refill Protocol Appointment Criteria  · Appointment scheduled in the past 12 months or in the next 3 months  Recent Outpatient Visits            1 week ago Physical exam    [address], [address] [place] [address]

## 2019-04-06 ENCOUNTER — HOSPITAL ENCOUNTER (OUTPATIENT)
Dept: MAMMOGRAPHY | Facility: HOSPITAL | Age: 64
Discharge: HOME | End: 2019-04-06
Attending: INTERNAL MEDICINE
Payer: COMMERCIAL

## 2019-04-06 DIAGNOSIS — Z12.31 SCREENING MAMMOGRAM, ENCOUNTER FOR: ICD-10-CM

## 2019-04-06 PROCEDURE — 77063 BREAST TOMOSYNTHESIS BI: CPT | Performed by: INTERNAL MEDICINE

## 2019-04-06 PROCEDURE — 77067 SCR MAMMO BI INCL CAD: CPT | Performed by: INTERNAL MEDICINE

## 2019-04-29 RX ORDER — MONTELUKAST SODIUM 10 MG/1
TABLET ORAL
Qty: 90 TABLET | Refills: 1 | Status: SHIPPED | OUTPATIENT
Start: 2019-04-29 | End: 2020-01-05

## 2019-04-29 RX ORDER — PANTOPRAZOLE SODIUM 40 MG/1
TABLET, DELAYED RELEASE ORAL
Qty: 90 TABLET | Refills: 1 | Status: SHIPPED | OUTPATIENT
Start: 2019-04-29 | End: 2019-09-30

## 2019-05-28 ENCOUNTER — OFFICE VISIT (OUTPATIENT)
Dept: HEMATOLOGY/ONCOLOGY | Facility: HOSPITAL | Age: 64
End: 2019-05-28
Attending: INTERNAL MEDICINE
Payer: COMMERCIAL

## 2019-05-28 VITALS
RESPIRATION RATE: 18 BRPM | SYSTOLIC BLOOD PRESSURE: 142 MMHG | TEMPERATURE: 98 F | OXYGEN SATURATION: 98 % | HEIGHT: 63 IN | DIASTOLIC BLOOD PRESSURE: 63 MMHG | HEART RATE: 67 BPM | WEIGHT: 166 LBS | BODY MASS INDEX: 29.41 KG/M2

## 2019-05-28 DIAGNOSIS — C50.411 MALIGNANT NEOPLASM OF UPPER-OUTER QUADRANT OF RIGHT BREAST IN FEMALE, ESTROGEN RECEPTOR NEGATIVE (HCC): Primary | ICD-10-CM

## 2019-05-28 DIAGNOSIS — R92.2 DENSE BREASTS: ICD-10-CM

## 2019-05-28 DIAGNOSIS — Z17.1 MALIGNANT NEOPLASM OF UPPER-OUTER QUADRANT OF RIGHT BREAST IN FEMALE, ESTROGEN RECEPTOR NEGATIVE (HCC): Primary | ICD-10-CM

## 2019-05-28 DIAGNOSIS — Z12.31 SCREENING MAMMOGRAM, ENCOUNTER FOR: ICD-10-CM

## 2019-05-28 PROCEDURE — 99213 OFFICE O/P EST LOW 20 MIN: CPT | Performed by: INTERNAL MEDICINE

## 2019-05-28 NOTE — PROGRESS NOTES
HPI   aB Woodson is a 71year old female here for f/u of Malignant neoplasm of upper-outer quadrant of right breast in female, estrogen receptor negative (hcc)  (primary encounter diagnosis)  Dense breasts     States she feels well other than seasona Vitamin D3 (VITAMIN D3) 2000 UNITS Oral Cap Take 2,000 Units by mouth daily. Disp:  Rfl:    Olopatadine HCl (PATADAY) 0.2 % Ophthalmic Solution 1 drop by Each eye route daily.    Disp:  Rfl:    OCUVITE (OCUVITE) Oral Tab Take 1 tablet by mouth daily with • Other (copd) Mother         cause of death- passed at age 80   • Alcohol and Other Disorders Associated Other         alcoholism grandmother   • Skin cancer Sister         Basal cell carcinoma-chest   • Breast Cancer Self          PHYSICAL EXAM:    BP 14 Neurological: She is alert and oriented to person, place, and time. Gait normal.   Skin: Skin is warm. No rash noted. No erythema. Psychiatric: She has a normal mood and affect. Nursing note and vitals reviewed.           ASSESSMENT/PLAN:   Malignant ne INDICATIONS:  Encounter for screening mammogram for malignant neoplasm of breast.  Personal history of breast carcinoma in 2007 treated with lumpectomy, radiation, and chemotherapy. Documented mother with breast carcinoma at age 76.  No breast complaints PLEASE NOTE: NORMAL MAMMOGRAM DOES NOT EXCLUDE THE POSSIBILITY OF BREAST CANCER. A CLINICALLY SUSPICIOUS PALPABLE LUMP SHOULD BE BIOPSIED.        For patients over the age of 36, the target due date for the patient's next mammogram has been entered into a

## 2019-06-15 ENCOUNTER — LAB ENCOUNTER (OUTPATIENT)
Dept: LAB | Facility: HOSPITAL | Age: 64
End: 2019-06-15
Attending: INTERNAL MEDICINE
Payer: COMMERCIAL

## 2019-06-15 DIAGNOSIS — Z00.00 PHYSICAL EXAM: ICD-10-CM

## 2019-06-15 PROCEDURE — 84443 ASSAY THYROID STIM HORMONE: CPT

## 2019-06-15 PROCEDURE — 80061 LIPID PANEL: CPT

## 2019-06-15 PROCEDURE — 36415 COLL VENOUS BLD VENIPUNCTURE: CPT

## 2019-06-15 PROCEDURE — 80053 COMPREHEN METABOLIC PANEL: CPT

## 2019-06-15 PROCEDURE — 85025 COMPLETE CBC W/AUTO DIFF WBC: CPT

## 2019-07-08 ENCOUNTER — OFFICE VISIT (OUTPATIENT)
Dept: DERMATOLOGY CLINIC | Facility: CLINIC | Age: 64
End: 2019-07-08
Payer: COMMERCIAL

## 2019-07-08 DIAGNOSIS — L57.0 ACTINIC KERATOSIS: ICD-10-CM

## 2019-07-08 DIAGNOSIS — L82.1 SEBORRHEIC KERATOSES: Primary | ICD-10-CM

## 2019-07-08 PROCEDURE — 17000 DESTRUCT PREMALG LESION: CPT | Performed by: DERMATOLOGY

## 2019-07-08 PROCEDURE — 99212 OFFICE O/P EST SF 10 MIN: CPT | Performed by: DERMATOLOGY

## 2019-07-08 NOTE — PROGRESS NOTES
HPI:     Chief Complaint     Spot;  Bump        HPI     Spot      Additional comments: pt c/o  red spot on chin              Bump      Additional comments: pt c/o bump RT side above ear, dry yellow color           Last edited by Errol Mustafa MA on 7/8/20 Tape           RASH  Cephalosporins          ITCHING  Duricef [Cefadroxil]      Hydrocodone             ITCHING  Sulfanilamide           RASH  Methocarbamol           RASH    Comment:Other name robaxin    Past Medical History:   Diagnosis Date   • Allergic Minutes per session: Not on file      Stress: Not on file    Relationships      Social connections:        Talks on phone: Not on file        Gets together: Not on file        Attends Anabaptism service: Not on file        Active member of club or organizat PHYSICAL EXAM:   Patient is alert and oriented and appears their stated age. They are well-nourished. Exam is remarkable for the following-  1. There is a 5 mm brown and tan verrucous keratotic lesion above the right ear in the scalp.   Noninflamed

## 2019-07-12 ENCOUNTER — OFFICE VISIT (OUTPATIENT)
Dept: INTERNAL MEDICINE CLINIC | Facility: CLINIC | Age: 64
End: 2019-07-12
Payer: COMMERCIAL

## 2019-07-12 VITALS
WEIGHT: 167 LBS | BODY MASS INDEX: 29.59 KG/M2 | RESPIRATION RATE: 14 BRPM | HEIGHT: 63 IN | DIASTOLIC BLOOD PRESSURE: 74 MMHG | TEMPERATURE: 98 F | HEART RATE: 66 BPM | SYSTOLIC BLOOD PRESSURE: 118 MMHG

## 2019-07-12 DIAGNOSIS — M25.562 PAIN IN BOTH KNEES, UNSPECIFIED CHRONICITY: ICD-10-CM

## 2019-07-12 DIAGNOSIS — M25.561 PAIN IN BOTH KNEES, UNSPECIFIED CHRONICITY: ICD-10-CM

## 2019-07-12 DIAGNOSIS — Z12.11 ENCOUNTER FOR SCREENING COLONOSCOPY: ICD-10-CM

## 2019-07-12 DIAGNOSIS — Z13.820 SCREENING FOR OSTEOPOROSIS: ICD-10-CM

## 2019-07-12 DIAGNOSIS — Z00.00 PHYSICAL EXAM: ICD-10-CM

## 2019-07-12 DIAGNOSIS — E78.00 PURE HYPERCHOLESTEROLEMIA: Primary | ICD-10-CM

## 2019-07-12 DIAGNOSIS — Z23 NEED FOR VACCINATION: ICD-10-CM

## 2019-07-12 DIAGNOSIS — J45.20 MILD INTERMITTENT ASTHMA, UNSPECIFIED WHETHER COMPLICATED: ICD-10-CM

## 2019-07-12 PROCEDURE — 99213 OFFICE O/P EST LOW 20 MIN: CPT | Performed by: INTERNAL MEDICINE

## 2019-07-12 PROCEDURE — 90670 PCV13 VACCINE IM: CPT | Performed by: INTERNAL MEDICINE

## 2019-07-12 PROCEDURE — 90471 IMMUNIZATION ADMIN: CPT | Performed by: INTERNAL MEDICINE

## 2019-07-12 PROCEDURE — 99397 PER PM REEVAL EST PAT 65+ YR: CPT | Performed by: INTERNAL MEDICINE

## 2019-07-12 RX ORDER — ALBUTEROL SULFATE 90 UG/1
2 AEROSOL, METERED RESPIRATORY (INHALATION) EVERY 4 HOURS PRN
Qty: 1 INHALER | Refills: 2 | Status: SHIPPED | OUTPATIENT
Start: 2019-07-12 | End: 2020-11-30

## 2019-07-12 NOTE — PROGRESS NOTES
HPI:    Patient ID: Ruben Hope is a 71year old female.   Patient presents with:  Knee Pain: bilateral x intermittent for couple years  Physical: pneumococcal vaccine, dexa order, pend med refill    Patient presents today for physical exam, states doi Cardiovascular: Negative for chest pain, palpitations and leg swelling. Gastrointestinal: Negative for abdominal pain, constipation, diarrhea, nausea and vomiting. Genitourinary: Negative for dysuria and frequency.    Musculoskeletal: Positive for arthr Head: Normocephalic and atraumatic.    Right Ear: Tympanic membrane, external ear and ear canal normal.   Left Ear: Tympanic membrane, external ear and ear canal normal.   Nose: Nose normal. Right sinus exhibits no maxillary sinus tenderness and no frontal Complete labs as ordered, preventative health maintenance testing discussed, screening mammogram with Dr. Maryjane Ross patient has history of breast cancer follow-up with Dr. Savita Barba colonoscopy advised patient to follow-up with gastroenterologist  DEXA scan GASTRO - INTERNAL  PHYSIATRY - INTERNAL  PNEUMOCOCCAL VACC, 13 GT IM  XR DEXA BONE DENSITOMETRY (CPT=77080)  XR KNEE BILAT EM (CPT=73560)       VQ#[id number]

## 2019-07-18 ENCOUNTER — TELEPHONE (OUTPATIENT)
Dept: INTERNAL MEDICINE CLINIC | Facility: CLINIC | Age: 64
End: 2019-07-18

## 2019-07-18 NOTE — TELEPHONE ENCOUNTER
[facility] Scheduling calling to state the DX used for the Bone Density is not acceptable by medicare and a new Dx should be given.     Please advise

## 2019-07-18 NOTE — TELEPHONE ENCOUNTER
Please advise patient Medicare does not cover DEXA scan screening      DEXA scan should be covered with her  secondary insurance

## 2019-07-19 NOTE — TELEPHONE ENCOUNTER
Pt advised, stated medicare is her 2nd INS, Riccardo Hamman is primary -stated she will call scheduling

## 2019-07-27 ENCOUNTER — HOSPITAL ENCOUNTER (OUTPATIENT)
Dept: BONE DENSITY | Age: 64
Discharge: HOME | End: 2019-07-27
Attending: INTERNAL MEDICINE
Payer: COMMERCIAL

## 2019-07-27 ENCOUNTER — HOSPITAL ENCOUNTER (OUTPATIENT)
Dept: GENERAL RADIOLOGY | Facility: HOSPITAL | Age: 64
Discharge: HOME | End: 2019-07-27
Attending: INTERNAL MEDICINE
Payer: COMMERCIAL

## 2019-07-27 DIAGNOSIS — M25.561 PAIN IN BOTH KNEES, UNSPECIFIED CHRONICITY: ICD-10-CM

## 2019-07-27 DIAGNOSIS — Z13.820 SCREENING FOR OSTEOPOROSIS: ICD-10-CM

## 2019-07-27 DIAGNOSIS — M25.562 PAIN IN BOTH KNEES, UNSPECIFIED CHRONICITY: ICD-10-CM

## 2019-07-27 PROCEDURE — 73560 X-RAY EXAM OF KNEE 1 OR 2: CPT | Performed by: INTERNAL MEDICINE

## 2019-07-27 PROCEDURE — 77080 DXA BONE DENSITY AXIAL: CPT | Performed by: INTERNAL MEDICINE

## 2019-08-15 ENCOUNTER — OFFICE VISIT (OUTPATIENT)
Dept: INTERNAL MEDICINE CLINIC | Facility: CLINIC | Age: 64
End: 2019-08-15
Payer: COMMERCIAL

## 2019-08-15 VITALS
RESPIRATION RATE: 20 BRPM | TEMPERATURE: 98 F | SYSTOLIC BLOOD PRESSURE: 142 MMHG | HEIGHT: 63 IN | HEART RATE: 69 BPM | BODY MASS INDEX: 30.19 KG/M2 | WEIGHT: 170.38 LBS | DIASTOLIC BLOOD PRESSURE: 80 MMHG

## 2019-08-15 DIAGNOSIS — E55.9 VITAMIN D DEFICIENCY: ICD-10-CM

## 2019-08-15 DIAGNOSIS — M81.0 OSTEOPOROSIS, UNSPECIFIED OSTEOPOROSIS TYPE, UNSPECIFIED PATHOLOGICAL FRACTURE PRESENCE: Primary | ICD-10-CM

## 2019-08-15 PROCEDURE — 99214 OFFICE O/P EST MOD 30 MIN: CPT | Performed by: INTERNAL MEDICINE

## 2019-08-15 NOTE — PROGRESS NOTES
HPI:    Patient ID: Ba Woodson is a 71year old female. Patient presents with:  Osteoporosis: Pt is f/u with her osterporosis    Patient in office today for osteoporosis . State has  fhx  Osteoporosis mother , sister aunts  . Panfilo Farr    Patient denies any TABLET BY MOUTH  EVERY MORNING BEFORE  BREAKFAST Disp: 90 tablet Rfl: 1   Cetirizine HCl 5 MG Oral Tab Take 5 mg by mouth daily. Disp:  Rfl:    triamcinolone acetonide 0.1 % Mouth/Throat Paste Place 1 Tube onto teeth 2 (two) times daily.  For canker sores exhibits no mass. There is no hepatosplenomegaly. There is no tenderness. There is no CVA tenderness. Musculoskeletal: She exhibits no edema. Lymphadenopathy:     She has no cervical adenopathy.    Neurological: She is alert and oriented to person, plac

## 2019-09-06 ENCOUNTER — NURSE ONLY (OUTPATIENT)
Dept: GASTROENTEROLOGY | Facility: CLINIC | Age: 64
End: 2019-09-06

## 2019-09-06 NOTE — PROGRESS NOTES
Patient: Lisa Ao #:  [id number]                    Room: [facility]. #:  [id number]  Order Number:  [id number]  Exam Description:   X Abdomen Flat Plate  Date of Exam:  11/21/2011     PROCEDURE:  X-RAY OF THE ABDOMEN     COMPARISON: None.

## 2019-09-06 NOTE — PROGRESS NOTES
Pt contacted and reviewed that based on last CLN (11/11/2011) and x-ray abdomen (11/21/2011) from Dr. Arlen Pinto below, she needs to be seen for consultation to review her options and next steps d/t poor prep/poor imaging on both tests.      I offered her sooner a

## 2019-09-06 NOTE — PROGRESS NOTES
Khoa BRAVO #:   [id number]                    Room: [facility]  Florina RAND #:  [id number]     ADMITTED:   11/11/2011     GI PROCEDURE:     DATE OF PROCEDURE:   11/11/2011     PROCEDURE:  Colonoscopy     SURGEON:  Stew Jamil M.D.      ATTEND IMPRESSION:  1. Incomplete examination to the ascending colon. 2. Suboptimal preparation. 3. Tortuous colon. 4. Internal hemorrhoids. RECOMMENDATION:  1. Schedule air-contrast barium enema to visualize the remainder of the     colon.   2. Next scre

## 2019-09-07 ENCOUNTER — APPOINTMENT (OUTPATIENT)
Dept: LAB | Facility: HOSPITAL | Age: 64
End: 2019-09-07
Attending: INTERNAL MEDICINE
Payer: COMMERCIAL

## 2019-09-07 DIAGNOSIS — M81.0 OSTEOPOROSIS, UNSPECIFIED OSTEOPOROSIS TYPE, UNSPECIFIED PATHOLOGICAL FRACTURE PRESENCE: ICD-10-CM

## 2019-09-07 DIAGNOSIS — E55.9 VITAMIN D DEFICIENCY: ICD-10-CM

## 2019-09-07 PROCEDURE — 36415 COLL VENOUS BLD VENIPUNCTURE: CPT

## 2019-09-07 PROCEDURE — 82306 VITAMIN D 25 HYDROXY: CPT

## 2019-09-09 ENCOUNTER — OFFICE VISIT (OUTPATIENT)
Dept: NEUROLOGY | Facility: CLINIC | Age: 64
End: 2019-09-09
Payer: COMMERCIAL

## 2019-09-09 VITALS
DIASTOLIC BLOOD PRESSURE: 74 MMHG | WEIGHT: 165 LBS | BODY MASS INDEX: 29.23 KG/M2 | SYSTOLIC BLOOD PRESSURE: 114 MMHG | HEIGHT: 63 IN | HEART RATE: 72 BPM

## 2019-09-09 DIAGNOSIS — M62.9 HAMSTRING TIGHTNESS OF BOTH LOWER EXTREMITIES: ICD-10-CM

## 2019-09-09 DIAGNOSIS — M17.0 PRIMARY OSTEOARTHRITIS OF BOTH KNEES: Primary | ICD-10-CM

## 2019-09-09 DIAGNOSIS — G47.9 SLEEP DISTURBANCE: ICD-10-CM

## 2019-09-09 DIAGNOSIS — Z85.3 PERSONAL HISTORY OF MALIGNANT NEOPLASM OF BREAST: ICD-10-CM

## 2019-09-09 LAB — 25(OH)D3 SERPL-MCNC: 45.6 NG/ML (ref 30–100)

## 2019-09-09 PROCEDURE — 99244 OFF/OP CNSLTJ NEW/EST MOD 40: CPT | Performed by: PHYSICAL MEDICINE & REHABILITATION

## 2019-09-09 RX ORDER — MELOXICAM 15 MG/1
15 TABLET ORAL DAILY
Qty: 14 TABLET | Refills: 0 | Status: SHIPPED | OUTPATIENT
Start: 2019-09-09 | End: 2019-09-23

## 2019-09-09 NOTE — PATIENT INSTRUCTIONS
-Start physical therapy and home exercises  -Mobic daily for the next 7-10 days and then as needed  -Ice/Heat as tolerated  -Please stop the medication if you have any side effects and call the office if you have any questions or concerns  -Follow up in 4

## 2019-09-09 NOTE — PROGRESS NOTES
[address] [facility] (formerly [facility])  NEW PATIENT EVALUATION    Consultation as a request of Dr. Natasha Perales    Chief Complaint: bilateral knee pain.     HISTORY OF PRESENT ILLNESS:   Patient presents with:  Knee Pain: Patient present • Mumps    • S/P colonoscopy 11/11/2011         PAST SURGICAL HISTORY:     Past Surgical History:   Procedure Laterality Date   • BIOPSY OF BREAST, NEEDLE CORE Right 9/11/2007 9/11/07 (core biopsy) Poorly differentiated infiltrating ductal carcinoma R History   Problem Relation Age of Onset   • Lipids Father         hyperlipideia   • Heart Disease Father         CAD   • Stroke Father         TIAs   • Dementia Father    • Heart Attack Father         myocardial infarction   • Thyroid disease Mother    • A EXAM:   /74 (BP Location: Left arm, Patient Position: Sitting, Cuff Size: adult)   Pulse 72   Ht 63\"   Wt 165 lb   BMI 29.23 kg/m²   General: No immediate distress  Head: Normocephalic/ Atraumatic  Eyes: Extra-occular movements intact.    Ears: No au BUNCREA 20.5 (H) 06/15/2019    CREATSERUM 0.83 06/15/2019    ANIONGAP 4 06/15/2019    GFRNAA 74 06/15/2019    GFRAA 86 06/15/2019    CA 9.5 06/15/2019    OSMOCALC 293 06/15/2019    ALKPHO 91 06/15/2019    AST 16 06/15/2019    ALT 23 06/15/2019    ALKPHOS 7 learning. All questions were answered.     Jose Nice DO, FAAPMR & CAQSM  Physical Medicine and Rehabilitation/Sports Medicine  MEDICAL CENTER [facility]

## 2019-09-16 ENCOUNTER — HOSPITAL (OUTPATIENT)
Dept: OTHER | Age: 64
End: 2019-09-16

## 2019-09-25 ENCOUNTER — MED REC SCAN ONLY (OUTPATIENT)
Dept: NEUROLOGY | Facility: CLINIC | Age: 64
End: 2019-09-25

## 2019-09-30 RX ORDER — PANTOPRAZOLE SODIUM 40 MG/1
TABLET, DELAYED RELEASE ORAL
Qty: 90 TABLET | Refills: 1 | Status: SHIPPED | OUTPATIENT
Start: 2019-09-30 | End: 2020-03-29

## 2019-09-30 NOTE — TELEPHONE ENCOUNTER
Refill passed per [facility], [facility] protocol.   Refill Protocol Appointment Criteria  · Appointment scheduled in the past 12 months or in the next 3 months  Recent Outpatient Visits            3 weeks ago Primary osteoarthritis of both knees    [facility]

## 2019-10-07 ENCOUNTER — APPOINTMENT (OUTPATIENT)
Dept: LAB | Facility: HOSPITAL | Age: 64
End: 2019-10-07
Attending: INTERNAL MEDICINE
Payer: COMMERCIAL

## 2019-10-07 ENCOUNTER — OFFICE VISIT (OUTPATIENT)
Dept: ENDOCRINOLOGY CLINIC | Facility: CLINIC | Age: 64
End: 2019-10-07
Payer: COMMERCIAL

## 2019-10-07 ENCOUNTER — OFFICE VISIT (OUTPATIENT)
Dept: NEUROLOGY | Facility: CLINIC | Age: 64
End: 2019-10-07
Payer: COMMERCIAL

## 2019-10-07 ENCOUNTER — TELEPHONE (OUTPATIENT)
Dept: ENDOCRINOLOGY CLINIC | Facility: CLINIC | Age: 64
End: 2019-10-07

## 2019-10-07 VITALS
BODY MASS INDEX: 29.22 KG/M2 | DIASTOLIC BLOOD PRESSURE: 86 MMHG | HEIGHT: 63.5 IN | HEART RATE: 72 BPM | WEIGHT: 167 LBS | RESPIRATION RATE: 18 BRPM | SYSTOLIC BLOOD PRESSURE: 140 MMHG

## 2019-10-07 VITALS
BODY MASS INDEX: 30 KG/M2 | WEIGHT: 170.81 LBS | SYSTOLIC BLOOD PRESSURE: 124 MMHG | DIASTOLIC BLOOD PRESSURE: 76 MMHG | HEART RATE: 81 BPM

## 2019-10-07 DIAGNOSIS — M17.0 PRIMARY OSTEOARTHRITIS OF BOTH KNEES: Primary | ICD-10-CM

## 2019-10-07 DIAGNOSIS — M81.0 AGE-RELATED OSTEOPOROSIS WITHOUT CURRENT PATHOLOGICAL FRACTURE: Primary | ICD-10-CM

## 2019-10-07 DIAGNOSIS — M81.0 AGE-RELATED OSTEOPOROSIS WITHOUT CURRENT PATHOLOGICAL FRACTURE: ICD-10-CM

## 2019-10-07 DIAGNOSIS — G47.9 SLEEP DISTURBANCE: ICD-10-CM

## 2019-10-07 DIAGNOSIS — M62.9 HAMSTRING TIGHTNESS OF BOTH LOWER EXTREMITIES: ICD-10-CM

## 2019-10-07 DIAGNOSIS — Z85.3 PERSONAL HISTORY OF MALIGNANT NEOPLASM OF BREAST: ICD-10-CM

## 2019-10-07 PROCEDURE — 99243 OFF/OP CNSLTJ NEW/EST LOW 30: CPT | Performed by: INTERNAL MEDICINE

## 2019-10-07 PROCEDURE — 83970 ASSAY OF PARATHORMONE: CPT

## 2019-10-07 PROCEDURE — 36415 COLL VENOUS BLD VENIPUNCTURE: CPT

## 2019-10-07 PROCEDURE — 82310 ASSAY OF CALCIUM: CPT

## 2019-10-07 PROCEDURE — 99213 OFFICE O/P EST LOW 20 MIN: CPT | Performed by: PHYSICAL MEDICINE & REHABILITATION

## 2019-10-07 PROCEDURE — 84080 ASSAY ALKALINE PHOSPHATASES: CPT

## 2019-10-07 NOTE — H&P
New Patient Evaluation - History and Physical    CONSULT - Reason for Visit:  Osteoporosis  Requesting Physician: Dr. Kev Flores:  Patient presents with:  Consult: Osteoporosis, referred by Dr. Miladys Solomon Current Outpatient Medications:  Multiple Vitamins-Minerals (ONE DAILY MULTIVITAMIN ADULT OR) Take by mouth daily.  Disp:  Rfl:    PANTOPRAZOLE SODIUM 40 MG Oral Tab EC TAKE 1 TABLET BY MOUTH  EVERY MORNING BEFORE  BREAKFAST Disp: 90 tablet Rfl: 1   Alb social      Drug use: No        Comment: NONE      Sexual activity: Yes    Other Topics      Concerns:        Caffeine Concern: Yes          chocolate--Per NG no        Exercise: No        Pt has a pacemaker: No        Pt has a defibrillator: No        [employer] Normocephalic, atraumatic  NECK:  Supple, symmetrical, trachea midline, no adenopathy, thyroid symmetric, not enlarged and no tenderness  HEMATOLOGIC/LYMPHATICS:  no cervical lymphadenopathy and no supraclavicular lymphadenopathy  LUNGS: clear to auscultat

## 2019-10-07 NOTE — PATIENT INSTRUCTIONS
-Turmeric powder or tabs in your meals  -Glucosamine/Chondroitin 1500/1200mg to be started daily  -Ice as needed  -NSAIDs as needed  -Keep up your home exercises  -Follow up in 3 months

## 2019-10-07 NOTE — PROGRESS NOTES
[address]  NEW PATIENT EVALUATION    Chief Complaint: bilateral knee pain.     HISTORY OF PRESENT ILLNESS:   Patient presents with:  Knee Pain: LOV 09/09/19 Completed PT for knee pain, state shes had good im end of the day and aching at night time  She denies numbness/tingling  She denies Fevers, Chills and Weight loss  Aggravating Factors: Walking and Stairs  Alleviating Factors: Meds  Prior Treatments: Meds: Bengay, Salon Pas, knee sleeve    Work Related: No by mouth daily. Disp:  Rfl:    Olopatadine HCl (PATADAY) 0.2 % Ophthalmic Solution 1 drop by Each eye route daily. Disp:  Rfl:    triamcinolone acetonide 0.1 % Mouth/Throat Paste Place 1 Tube onto teeth 2 (two) times daily.  For canker sores  In  Mouth pressure/discomfort, orthopnea and paroxysmal nocturnal dyspnea  Gastrointestinal: negative for abdominal pain, constipation and diarrhea  Genitourinary:negative for dysuria, frequency and urinary incontinence  Hematologic/lymphatic: negative for bleeding instability  Valgus/Varus stress test: negative for instablity  Alesia Test: negative for medial or lateral joint line pain or \"catch\"  Trendelenberg: positive for glut med weakness on both sides      LABS:   No results found for: EAG, A1C  Lab Results glucosamine and chondroitin which she should take daily for the next several months in order to see if there is any improvement.   Additionally, I advised that will be very important for her to continue her home exercises and to ice her knees when she notic

## 2019-10-08 ENCOUNTER — TELEPHONE (OUTPATIENT)
Dept: ENDOCRINOLOGY CLINIC | Facility: CLINIC | Age: 64
End: 2019-10-08

## 2019-10-09 ENCOUNTER — MED REC SCAN ONLY (OUTPATIENT)
Dept: NEUROLOGY | Facility: CLINIC | Age: 64
End: 2019-10-09

## 2019-10-15 NOTE — TELEPHONE ENCOUNTER
Received letter from [address] [place] [address]. \"Your patients medical health plan advised that prolia is not covered through your patient's medical benefit at this time. Johnny Ramirez \"    Please advise, routed to provider.

## 2019-10-16 NOTE — TELEPHONE ENCOUNTER
Prolia is not covered  She has acid reflux and hence oral bisphosphonates will not be an ideal option. Another option is reclast  This is a once a year injection  It is intravenous  Main CI is in patients with extensive dental history.    Please rule out i

## 2019-10-17 NOTE — TELEPHONE ENCOUNTER
Patient verbalized understanding Prolia is not covered - Reclast discussed. Pt states she prefers Prolia and will call her insurance to see why it is not covered.  Pt understands to contact dentist for clearance letter is she decides to proceed with reclast

## 2019-10-21 NOTE — TELEPHONE ENCOUNTER
Ashlee Lewis. She states Prolia is covered under medical benefit as specialty medication. Requests that we complete pre-determination form to confirm coverage.

## 2019-10-21 NOTE — TELEPHONE ENCOUNTER
Anastasia/MARCELINA requesting a call back regarding [facility] for [facility].  Please call [phone]

## 2019-10-28 ENCOUNTER — TELEPHONE (OUTPATIENT)
Dept: GASTROENTEROLOGY | Facility: CLINIC | Age: 64
End: 2019-10-28

## 2019-10-28 ENCOUNTER — OFFICE VISIT (OUTPATIENT)
Dept: GASTROENTEROLOGY | Facility: CLINIC | Age: 64
End: 2019-10-28
Payer: COMMERCIAL

## 2019-10-28 VITALS
DIASTOLIC BLOOD PRESSURE: 75 MMHG | SYSTOLIC BLOOD PRESSURE: 142 MMHG | BODY MASS INDEX: 29.92 KG/M2 | HEART RATE: 75 BPM | WEIGHT: 171 LBS | HEIGHT: 63.5 IN

## 2019-10-28 DIAGNOSIS — Z12.11 COLON CANCER SCREENING: Primary | ICD-10-CM

## 2019-10-28 DIAGNOSIS — Z12.11 ENCOUNTER FOR SCREENING COLONOSCOPY: Primary | ICD-10-CM

## 2019-10-28 DIAGNOSIS — K64.9 HEMORRHOIDS, UNSPECIFIED HEMORRHOID TYPE: ICD-10-CM

## 2019-10-28 DIAGNOSIS — K59.00 CONSTIPATION, UNSPECIFIED CONSTIPATION TYPE: ICD-10-CM

## 2019-10-28 PROCEDURE — 99244 OFF/OP CNSLTJ NEW/EST MOD 40: CPT | Performed by: INTERNAL MEDICINE

## 2019-10-28 NOTE — H&P
[facility] [facility] - Gastroenterology  Clinic History and Physical     Patient presents with:  Colonoscopy Screening      HPI:   Cihnmay Boyer is a 71year old year-old female with history of skin CA, breast CA and c section who presents for screening co Relation Age of Onset   • Lipids Father         hyperlipideia   • Heart Disease Father         CAD   • Stroke Father         TIAs   • Dementia Father    • Heart Attack Father         myocardial infarction   • Thyroid disease Mother    • Asthma Mother    • Carbonate-Vitamin D (CALCIUM 500/D) 500-200 MG-UNIT Oral Tab, Take 1 tablet by mouth 2 (two) times daily. , Disp: , Rfl:   Vitamin D3 (VITAMIN D3) 2000 UNITS Oral Cap, Take 2,000 Units by mouth daily.   , Disp: , Rfl:   Olopatadine HCl (PATADAY) 0.2 % Opht today.       Recent Labs     08/26/17  0726 06/15/19  0723   RBC 4.16 4.20   HGB 12.9 12.9   HCT 38.9 39.8   MCV 93.4 94.8   MCH 31.0 30.7   MCHC 33.2 32.4   RDW 13.0 12.4   NEPRELIM  --  2.38   WBC 4.9 4.7    206.0     Lab Results   Component Value patient [who demonstrated understanding], including but not limited to the risks of bleeding, infection, pain, as well as the risks of anesthesia and perforation all leading to prolonged hospitalization or surgical intervention.  I also specifically mention

## 2019-10-28 NOTE — PATIENT INSTRUCTIONS
1. Schedule colonoscopy with MAC [Diagnosis: screening, poor prep in the past] Ok to schedule on 12/19 at 10-6135AM or MD approval slot    2.  Miralax daily 1 week before prep or if unable to have bowel movement for 1-2 days  -Buy over the counter dulcolax

## 2019-10-28 NOTE — TELEPHONE ENCOUNTER
Scheduled for:  Colonoscopy 78333  Provider Name: Dr. Juan J Groves  Date:  12/19/19  Location:  [facility]  Sedation:  MAC  Time:   1000 (pt is aware to arrive at 0900)   Prep:  Suprep  Meds/Allergies Reconciled?:  Physician reviewed   Diagnosis with codes:  Colon cance

## 2019-11-05 NOTE — TELEPHONE ENCOUNTER
Called Anastasia with [facility]. She states Prolia is \"eligible\" and coverage letter was sent out 10/23. RN requested that she fax letter to our office.

## 2019-11-08 NOTE — TELEPHONE ENCOUNTER
Received Letter from [facility] [address]. \"After reviewing your inquiry, we determined that the procedure,  (Injection, Denosumab), does not require a predetermination of benefits prior to services being rendered.  Please be aware that this is not a guarantee of

## 2019-12-02 ENCOUNTER — NURSE ONLY (OUTPATIENT)
Dept: ENDOCRINOLOGY CLINIC | Facility: CLINIC | Age: 64
End: 2019-12-02
Payer: COMMERCIAL

## 2019-12-02 DIAGNOSIS — M81.0 AGE-RELATED OSTEOPOROSIS WITHOUT CURRENT PATHOLOGICAL FRACTURE: Primary | ICD-10-CM

## 2019-12-02 PROCEDURE — 96372 THER/PROPH/DIAG INJ SC/IM: CPT | Performed by: INTERNAL MEDICINE

## 2019-12-05 ENCOUNTER — OFFICE VISIT (OUTPATIENT)
Dept: DERMATOLOGY CLINIC | Facility: CLINIC | Age: 64
End: 2019-12-05
Payer: COMMERCIAL

## 2019-12-05 DIAGNOSIS — D23.30 BENIGN NEOPLASM OF SKIN OF FACE: ICD-10-CM

## 2019-12-05 DIAGNOSIS — D23.60 BENIGN NEOPLASM OF SKIN OF UPPER LIMB, INCLUDING SHOULDER, UNSPECIFIED LATERALITY: ICD-10-CM

## 2019-12-05 DIAGNOSIS — Z85.828 PERSONAL HISTORY OF SKIN CANCER: Primary | ICD-10-CM

## 2019-12-05 DIAGNOSIS — L72.0 EPIDERMAL CYST: ICD-10-CM

## 2019-12-05 DIAGNOSIS — D23.70 BENIGN NEOPLASM OF SKIN OF LOWER LIMB, INCLUDING HIP, UNSPECIFIED LATERALITY: ICD-10-CM

## 2019-12-05 DIAGNOSIS — D23.4 BENIGN NEOPLASM OF SCALP AND SKIN OF NECK: ICD-10-CM

## 2019-12-05 DIAGNOSIS — L81.4 SOLAR LENTIGO: ICD-10-CM

## 2019-12-05 DIAGNOSIS — L82.1 SEBORRHEIC KERATOSES: ICD-10-CM

## 2019-12-05 DIAGNOSIS — Z87.2 HISTORY OF ACTINIC KERATOSES: ICD-10-CM

## 2019-12-05 DIAGNOSIS — D23.5 BENIGN NEOPLASM OF SKIN OF TRUNK, EXCEPT SCROTUM: ICD-10-CM

## 2019-12-05 PROCEDURE — 99213 OFFICE O/P EST LOW 20 MIN: CPT | Performed by: DERMATOLOGY

## 2019-12-05 NOTE — PROGRESS NOTES
HPI:     Chief Complaint     Full Skin Exam        HPI     Full Skin Exam      Additional comments: LOV 7/8/2019 Patient present for full body skin exam. Patient has hx of [employer]          Last edited by Adrien Pringle, [address] on 12/5/2019  3:39 PM. (History) every 4 (four) hours as needed for Wheezing. 1 Inhaler 2   • MONTELUKAST SODIUM 10 MG Oral Tab TAKE 1 TABLET BY MOUTH ONCE DAILY 90 tablet 1   • Cetirizine HCl 5 MG Oral Tab Take 5 mg by mouth daily.      • triamcinolone acetonide 0.1 % Mouth/Throat Paste P Not on file      Years of education: Not on file      Highest education level: Not on file    Occupational History      Not on file    Social Needs      Financial resource strain: Not on file      Food insecurity:        Worry: Not on file        Inability Self-Exams: Not Asked        Grew up on a farm: Not Asked        History of tanning: Not Asked        Outdoor occupation: Not Asked        Pt has a pacemaker: No        Pt has a defibrillator: No        Breast feeding: Not Asked        Reaction to local an diagnosis)-no recurrence or new suspicious lesions–recommend monthly self exams, and yearly full body checks. Patient knows to come sooner if notes something new or changing. Epidermal cyst-bothersome to patient.   She would like removal.  What this would

## 2019-12-16 ENCOUNTER — TELEPHONE (OUTPATIENT)
Dept: GASTROENTEROLOGY | Facility: CLINIC | Age: 64
End: 2019-12-16

## 2019-12-16 NOTE — TELEPHONE ENCOUNTER
I spoke to the pt and I advised her of Dr Walt Fish recommendations below. She verbalizes understanding.  She will call me tomorrow is the same thing happens with the dulcolax

## 2019-12-16 NOTE — TELEPHONE ENCOUNTER
Pt was supposed to take dulcolax suppositories 3 nights in a row prior to her colonoscopy due to a hx of a tortuous colon & incomplete exam in the ascending colon    Last night was the first dose of dulcolax and this morning she spent over an hour in the b

## 2019-12-16 NOTE — TELEPHONE ENCOUNTER
She is probably very constipated. It should get better after the first dose. I would recommend eating light and continuing the prep as ordered.

## 2019-12-16 NOTE — TELEPHONE ENCOUNTER
Patient states she was told to use Ducolax as part of her preps for 12/19/2019 CLN and she is not feeling well. Please call. Thank you.

## 2019-12-19 ENCOUNTER — ANESTHESIA EVENT (OUTPATIENT)
Dept: ENDOSCOPY | Facility: HOSPITAL | Age: 64
End: 2019-12-19
Payer: COMMERCIAL

## 2019-12-19 ENCOUNTER — ANESTHESIA (OUTPATIENT)
Dept: ENDOSCOPY | Facility: HOSPITAL | Age: 64
End: 2019-12-19
Payer: COMMERCIAL

## 2019-12-19 ENCOUNTER — HOSPITAL ENCOUNTER (OUTPATIENT)
Facility: HOSPITAL | Age: 64
Setting detail: HOSPITAL OUTPATIENT SURGERY
Discharge: HOME | End: 2019-12-19
Attending: INTERNAL MEDICINE | Admitting: INTERNAL MEDICINE
Payer: COMMERCIAL

## 2019-12-19 DIAGNOSIS — Z12.11 COLON CANCER SCREENING: ICD-10-CM

## 2019-12-19 DIAGNOSIS — K64.4 EXTERNAL HEMORRHOIDS WITH COMPLICATION: Primary | ICD-10-CM

## 2019-12-19 PROCEDURE — 0DBE8ZX EXCISION OF LARGE INTESTINE, VIA NATURAL OR ARTIFICIAL OPENING ENDOSCOPIC, DIAGNOSTIC: ICD-10-PCS | Performed by: INTERNAL MEDICINE

## 2019-12-19 PROCEDURE — 45378 DIAGNOSTIC COLONOSCOPY: CPT | Performed by: INTERNAL MEDICINE

## 2019-12-19 RX ORDER — NALOXONE HYDROCHLORIDE 0.4 MG/ML
80 INJECTION, SOLUTION INTRAMUSCULAR; INTRAVENOUS; SUBCUTANEOUS AS NEEDED
Status: CANCELLED | OUTPATIENT
Start: 2019-12-19 | End: 2019-12-19

## 2019-12-19 RX ORDER — SODIUM CHLORIDE, SODIUM LACTATE, POTASSIUM CHLORIDE, CALCIUM CHLORIDE 600; 310; 30; 20 MG/100ML; MG/100ML; MG/100ML; MG/100ML
INJECTION, SOLUTION INTRAVENOUS CONTINUOUS
Status: DISCONTINUED | OUTPATIENT
Start: 2019-12-19 | End: 2019-12-19

## 2019-12-19 RX ORDER — SODIUM CHLORIDE, SODIUM LACTATE, POTASSIUM CHLORIDE, CALCIUM CHLORIDE 600; 310; 30; 20 MG/100ML; MG/100ML; MG/100ML; MG/100ML
INJECTION, SOLUTION INTRAVENOUS CONTINUOUS
Status: CANCELLED | OUTPATIENT
Start: 2019-12-19

## 2019-12-19 RX ORDER — LIDOCAINE HYDROCHLORIDE 10 MG/ML
INJECTION, SOLUTION EPIDURAL; INFILTRATION; INTRACAUDAL; PERINEURAL AS NEEDED
Status: DISCONTINUED | OUTPATIENT
Start: 2019-12-19 | End: 2019-12-19 | Stop reason: SURG

## 2019-12-19 RX ADMIN — SODIUM CHLORIDE, SODIUM LACTATE, POTASSIUM CHLORIDE, CALCIUM CHLORIDE: 600; 310; 30; 20 INJECTION, SOLUTION INTRAVENOUS at 10:03:00

## 2019-12-19 RX ADMIN — SODIUM CHLORIDE, SODIUM LACTATE, POTASSIUM CHLORIDE, CALCIUM CHLORIDE: 600; 310; 30; 20 INJECTION, SOLUTION INTRAVENOUS at 09:32:00

## 2019-12-19 RX ADMIN — LIDOCAINE HYDROCHLORIDE 50 MG: 10 INJECTION, SOLUTION EPIDURAL; INFILTRATION; INTRACAUDAL; PERINEURAL at 09:37:00

## 2019-12-19 NOTE — OPERATIVE REPORT
COLONOSCOPY REPORT    Kristofer Lazo     1955 Age 71year old   PCP Jenny Mosher MD Endoscopist Nicolás Woodard MD     Date of procedure: 19    Procedure: Colonoscopy w/MAC sedation    Pre-operative diagnosis: screening    Post-operative normal vascular pattern, without evidence of angioectasias or inflammation. 6. FATUMA: normal rectal tone, no masses palpated. Recommend:  · Await pathology. The interval for the next colonoscopy will be determined after reviewing pathology.  If new si

## 2019-12-19 NOTE — ANESTHESIA PREPROCEDURE EVALUATION
Anesthesia PreOp Note    HPI:     Ruben Hope is a 71year old female who presents for preoperative consultation requested by: Sukh De La Paz MD    Date of Surgery: 12/19/2019    Procedure(s):  COLONOSCOPY  Indication: Colon cancer screening    Gabbi Gardner carcinoma   • Chickenpox    • GERD (gastroesophageal reflux disease)    • Measles    • Mumps    • S/P colonoscopy 11/11/2011       Past Surgical History:   Procedure Laterality Date   • BIOPSY OF BREAST, NEEDLE CORE Right 9/11/2007 9/11/07 (core biopsy) Disp: , Rfl: , 12/11/2019  Vitamin D3 (VITAMIN D3) 2000 UNITS Oral Cap, Take 2,000 Units by mouth daily. , Disp: , Rfl: , 12/12/2019  Olopatadine HCl (PATADAY) 0.2 % Ophthalmic Solution, 1 drop by Each eye route daily.   , Disp: , Rfl: , 12/12/2019      la Never Used    Substance and Sexual Activity      Alcohol use:  Yes        Alcohol/week: 0.0 standard drinks        Frequency: Monthly or less        Comment: social      Drug use: No        Comment: NONE      Sexual activity: Yes    Lifestyle      Physical BP: 139/76   Pulse: 89   Resp: 20   SpO2: 96%   Weight: 75.8 kg (167 lb)   Height: 1.6 m (5' 3\")        Anesthesia Evaluation     Patient summary reviewed    Airway   Mallampati: II  TM distance: >3 FB  Neck ROM: full  Dental - normal exam     Pulmonary

## 2019-12-19 NOTE — ANESTHESIA POSTPROCEDURE EVALUATION
Patient: Bruce Stewart    Procedure Summary     Date:  12/19/19 Room / Location:  [facility] ENDOSCOPY 05 / [facility] ENDOSCOPY    Anesthesia Start:  4329 Anesthesia Stop:      Procedure:  COLONOSCOPY (N/A ) Diagnosis:       Colon cancer screening      (sigmoid eryt

## 2019-12-20 VITALS
BODY MASS INDEX: 29.59 KG/M2 | OXYGEN SATURATION: 98 % | HEART RATE: 76 BPM | DIASTOLIC BLOOD PRESSURE: 97 MMHG | SYSTOLIC BLOOD PRESSURE: 134 MMHG | WEIGHT: 167 LBS | HEIGHT: 63 IN | RESPIRATION RATE: 12 BRPM

## 2019-12-23 ENCOUNTER — TELEPHONE (OUTPATIENT)
Dept: GASTROENTEROLOGY | Facility: CLINIC | Age: 64
End: 2019-12-23

## 2019-12-23 NOTE — TELEPHONE ENCOUNTER
Patient states she had CLN on 12/19/2019 and is having issues with diarrhea. Please call. Thank you.

## 2019-12-23 NOTE — TELEPHONE ENCOUNTER
Noted and agree about diarrhea and diet    No need to go to ER for the hand.  Can follow up with primary if needed but can have pain and bruising at the site for a few days to weeks    Osmin Weber MD  .

## 2019-12-23 NOTE — TELEPHONE ENCOUNTER
Pt had a colonoscopy 12/19/19. When she returned home she was eating foods such as chicken pot pie, donut, bagel with cream cheese, ciabatta rolls    Saturday she spend about 45 minutes on the toilet with gas and diarrhea.     On Sunday she took a dulcol

## 2020-01-05 RX ORDER — MONTELUKAST SODIUM 10 MG/1
TABLET ORAL
Qty: 90 TABLET | Refills: 1 | Status: SHIPPED | OUTPATIENT
Start: 2020-01-05 | End: 2020-06-11

## 2020-01-05 NOTE — TELEPHONE ENCOUNTER
Refill passed per [facility], [facility] protocol.   Refill Protocol Appointment Criteria  · Appointment scheduled in the past 6 months or in the next 3 months  Recent Outpatient Visits            1 month ago Personal history of skin cancer    [facility]

## 2020-01-17 ENCOUNTER — TELEPHONE (OUTPATIENT)
Dept: GASTROENTEROLOGY | Facility: CLINIC | Age: 65
End: 2020-01-17

## 2020-01-17 NOTE — TELEPHONE ENCOUNTER
Spoke to pt and relayed Dr. Keysha Woodward message as shown below. Pt verbalized understanding of whole message and had no further questions at this time.

## 2020-01-17 NOTE — TELEPHONE ENCOUNTER
Dr Trip Elizabeth,    I advised the pt to take Miralax daily for her constipation. 1 capful daily mixed with 8 oz of juice or water    She can use the Gas-X for bloating and gas.  Not to exceed the recommended daily dose on the box     I told her to make sure she is

## 2020-01-17 NOTE — TELEPHONE ENCOUNTER
Noted and agree. Should be on something regularly.  Come in if gets severe including, fevers, pain, etc.

## 2020-01-17 NOTE — TELEPHONE ENCOUNTER
Patient indicates she is having irregular bowels, constipation, gas/side pain, which is putting pressure on her bladder. Please call at:[phone],thanks.

## 2020-03-29 RX ORDER — PANTOPRAZOLE SODIUM 40 MG/1
TABLET, DELAYED RELEASE ORAL
Qty: 90 TABLET | Refills: 1 | Status: SHIPPED | OUTPATIENT
Start: 2020-03-29 | End: 2020-09-06

## 2020-05-12 ENCOUNTER — TELEPHONE (OUTPATIENT)
Dept: HEMATOLOGY/ONCOLOGY | Facility: HOSPITAL | Age: 65
End: 2020-05-12

## 2020-05-12 DIAGNOSIS — Z12.31 SCREENING MAMMOGRAM, ENCOUNTER FOR: ICD-10-CM

## 2020-05-12 DIAGNOSIS — C50.411 MALIGNANT NEOPLASM OF UPPER-OUTER QUADRANT OF RIGHT BREAST IN FEMALE, ESTROGEN RECEPTOR NEGATIVE (HCC): Primary | ICD-10-CM

## 2020-05-12 DIAGNOSIS — Z17.1 MALIGNANT NEOPLASM OF UPPER-OUTER QUADRANT OF RIGHT BREAST IN FEMALE, ESTROGEN RECEPTOR NEGATIVE (HCC): Primary | ICD-10-CM

## 2020-05-12 NOTE — TELEPHONE ENCOUNTER
Called pt and left message that her order has been placed in Epic for mammogram. OK to call and schedule now.

## 2020-05-12 NOTE — TELEPHONE ENCOUNTER
Chico Ridley called looking to get an updated order that was placed back in May of 2019 for a screening mammogram. The order will  May 28 th, 2020 and she cannot schedule an appointment with that department due to the expiration date and availability.  She

## 2020-05-22 ENCOUNTER — TELEPHONE (OUTPATIENT)
Dept: ENDOCRINOLOGY CLINIC | Facility: CLINIC | Age: 65
End: 2020-05-22

## 2020-05-22 DIAGNOSIS — M81.0 AGE-RELATED OSTEOPOROSIS WITHOUT CURRENT PATHOLOGICAL FRACTURE: Primary | ICD-10-CM

## 2020-05-22 NOTE — TELEPHONE ENCOUNTER
Patient due for Prolia on/after June 3rd. Patient has an appointment with Dr. Tiffani Kenyon scheduled on June 5th. Needs labs, added per protocol.  Prolia IV submitted and pended for approval.

## 2020-06-01 ENCOUNTER — APPOINTMENT (OUTPATIENT)
Dept: HEMATOLOGY/ONCOLOGY | Facility: HOSPITAL | Age: 65
End: 2020-06-01
Attending: INTERNAL MEDICINE
Payer: COMMERCIAL

## 2020-06-05 ENCOUNTER — OFFICE VISIT (OUTPATIENT)
Dept: ENDOCRINOLOGY CLINIC | Facility: CLINIC | Age: 65
End: 2020-06-05
Payer: COMMERCIAL

## 2020-06-05 ENCOUNTER — APPOINTMENT (OUTPATIENT)
Dept: LAB | Facility: HOSPITAL | Age: 65
End: 2020-06-05
Attending: INTERNAL MEDICINE
Payer: COMMERCIAL

## 2020-06-05 VITALS
WEIGHT: 163 LBS | HEART RATE: 77 BPM | DIASTOLIC BLOOD PRESSURE: 83 MMHG | BODY MASS INDEX: 29 KG/M2 | SYSTOLIC BLOOD PRESSURE: 143 MMHG

## 2020-06-05 DIAGNOSIS — M81.0 AGE-RELATED OSTEOPOROSIS WITHOUT CURRENT PATHOLOGICAL FRACTURE: ICD-10-CM

## 2020-06-05 DIAGNOSIS — M81.0 AGE-RELATED OSTEOPOROSIS WITHOUT CURRENT PATHOLOGICAL FRACTURE: Primary | ICD-10-CM

## 2020-06-05 PROCEDURE — 96372 THER/PROPH/DIAG INJ SC/IM: CPT | Performed by: INTERNAL MEDICINE

## 2020-06-05 PROCEDURE — 83970 ASSAY OF PARATHORMONE: CPT

## 2020-06-05 PROCEDURE — 80048 BASIC METABOLIC PNL TOTAL CA: CPT

## 2020-06-05 PROCEDURE — 99213 OFFICE O/P EST LOW 20 MIN: CPT | Performed by: INTERNAL MEDICINE

## 2020-06-05 PROCEDURE — 36415 COLL VENOUS BLD VENIPUNCTURE: CPT

## 2020-06-05 PROCEDURE — 82306 VITAMIN D 25 HYDROXY: CPT

## 2020-06-05 PROCEDURE — 84080 ASSAY ALKALINE PHOSPHATASES: CPT

## 2020-06-05 NOTE — TELEPHONE ENCOUNTER
Pt has apt today at 9:00am. No response from insurance. Rechecked SOB. Original IV was submitted with secondary insurance missing. Re-submitted. Waiting response.

## 2020-06-05 NOTE — PROGRESS NOTES
Return Office Visit     CHIEF COMPLAINT:  Patient presents with: Follow - Up: Prolia Injection. Pt reports no broken bones or fractures. Labs were not completed, pt reports she wasnt told to do labs.         HISTORY OF PRESENT ILLNESS:  Chikis Jerome is (two) times daily. For canker sores  In  Mouth 5 g 1   • Calcium Carbonate-Vitamin D (CALCIUM 500/D) 500-200 MG-UNIT Oral Tab Take 1 tablet by mouth 2 (two) times daily. • Vitamin D3 (VITAMIN D3) 2000 UNITS Oral Cap Take 2,000 Units by mouth daily. Normocephalic, atraumatic  NECK:  Supple, symmetrical, trachea midline, no adenopathy, thyroid symmetric, not enlarged and no tenderness  LUNGS: clear to auscultation bilaterally, no crackles or wheezing  CARDIOVASCULAR:  regular rate and rhythm, normal S1

## 2020-06-07 ENCOUNTER — PATIENT MESSAGE (OUTPATIENT)
Dept: ENDOCRINOLOGY CLINIC | Facility: CLINIC | Age: 65
End: 2020-06-07

## 2020-06-11 RX ORDER — MONTELUKAST SODIUM 10 MG/1
TABLET ORAL
Qty: 90 TABLET | Refills: 1 | Status: SHIPPED | OUTPATIENT
Start: 2020-06-11 | End: 2020-12-11

## 2020-06-17 ENCOUNTER — HOSPITAL ENCOUNTER (OUTPATIENT)
Dept: MAMMOGRAPHY | Facility: HOSPITAL | Age: 65
Discharge: HOME | End: 2020-06-17
Attending: NURSE PRACTITIONER
Payer: COMMERCIAL

## 2020-06-17 DIAGNOSIS — Z12.31 SCREENING MAMMOGRAM, ENCOUNTER FOR: ICD-10-CM

## 2020-06-17 DIAGNOSIS — Z17.1 MALIGNANT NEOPLASM OF UPPER-OUTER QUADRANT OF RIGHT BREAST IN FEMALE, ESTROGEN RECEPTOR NEGATIVE (HCC): ICD-10-CM

## 2020-06-17 DIAGNOSIS — C50.411 MALIGNANT NEOPLASM OF UPPER-OUTER QUADRANT OF RIGHT BREAST IN FEMALE, ESTROGEN RECEPTOR NEGATIVE (HCC): ICD-10-CM

## 2020-06-17 PROCEDURE — 77067 SCR MAMMO BI INCL CAD: CPT | Performed by: NURSE PRACTITIONER

## 2020-06-17 PROCEDURE — 77063 BREAST TOMOSYNTHESIS BI: CPT | Performed by: NURSE PRACTITIONER

## 2020-06-19 ENCOUNTER — OFFICE VISIT (OUTPATIENT)
Dept: HEMATOLOGY/ONCOLOGY | Facility: HOSPITAL | Age: 65
End: 2020-06-19
Attending: INTERNAL MEDICINE
Payer: COMMERCIAL

## 2020-06-19 VITALS
HEART RATE: 86 BPM | WEIGHT: 163 LBS | RESPIRATION RATE: 18 BRPM | BODY MASS INDEX: 28.88 KG/M2 | SYSTOLIC BLOOD PRESSURE: 131 MMHG | TEMPERATURE: 99 F | HEIGHT: 63 IN | DIASTOLIC BLOOD PRESSURE: 77 MMHG | OXYGEN SATURATION: 97 %

## 2020-06-19 DIAGNOSIS — C50.411 MALIGNANT NEOPLASM OF UPPER-OUTER QUADRANT OF RIGHT BREAST IN FEMALE, ESTROGEN RECEPTOR NEGATIVE (HCC): Primary | ICD-10-CM

## 2020-06-19 DIAGNOSIS — Z85.3 ENCOUNTER FOR FOLLOW-UP SURVEILLANCE OF BREAST CANCER: ICD-10-CM

## 2020-06-19 DIAGNOSIS — Z08 ENCOUNTER FOR FOLLOW-UP SURVEILLANCE OF BREAST CANCER: ICD-10-CM

## 2020-06-19 DIAGNOSIS — Z85.3 HISTORY OF BREAST CANCER: ICD-10-CM

## 2020-06-19 DIAGNOSIS — Z17.1 MALIGNANT NEOPLASM OF UPPER-OUTER QUADRANT OF RIGHT BREAST IN FEMALE, ESTROGEN RECEPTOR NEGATIVE (HCC): Primary | ICD-10-CM

## 2020-06-19 DIAGNOSIS — R92.2 DENSE BREASTS: ICD-10-CM

## 2020-06-19 DIAGNOSIS — Z12.31 SCREENING MAMMOGRAM, ENCOUNTER FOR: ICD-10-CM

## 2020-06-19 PROCEDURE — 99213 OFFICE O/P EST LOW 20 MIN: CPT | Performed by: INTERNAL MEDICINE

## 2020-06-19 NOTE — PROGRESS NOTES
SHIVAM Dumont is a 76year old female here for f/u of Malignant neoplasm of upper-outer quadrant of right breast in female, estrogen receptor negative (hcc)  (primary encounter diagnosis)  Encounter for follow-up surveillance of breast cancer  His (PROAIR HFA) 108 (90 Base) MCG/ACT Inhalation Aero Soln Inhale 2 puffs into the lungs every 4 (four) hours as needed for Wheezing. 1 Inhaler 2   • Cetirizine HCl 5 MG Oral Tab Take 5 mg by mouth daily.      • triamcinolone acetonide 0.1 % Mouth/Throat Paste Cigarettes        Quit date: 1981        Years since quittin.4      Smokeless tobacco: Never Used    Alcohol use:  Yes      Alcohol/week: 0.0 standard drinks      Frequency: Monthly or less      Comment: social    Drug use: No      Comment: NONE inverted nipple, no mass, no nipple discharge, no skin change and no tenderness. Left breast exhibits no inverted nipple, no mass, no nipple discharge, no skin change and no tenderness. Abdominal: Soft.  Bowel sounds are normal. She exhibits no disten KULWANT 2D+3D SCREENING BILAT (15038/32445), 4/06/2019, 9:32 AM.     INDICATIONS:  Encounter for screening mammogram for malignant neoplasm of breast Z17.1 Estrogen receptor negative status (ER-) C50.411 Malignant neoplasm of upper-out*     TECHNIQUE:    Full

## 2020-09-06 RX ORDER — PANTOPRAZOLE SODIUM 40 MG/1
TABLET, DELAYED RELEASE ORAL
Qty: 90 TABLET | Refills: 3 | Status: SHIPPED | OUTPATIENT
Start: 2020-09-06 | End: 2021-10-16

## 2020-10-06 ENCOUNTER — IMMUNIZATION (OUTPATIENT)
Dept: INTERNAL MEDICINE CLINIC | Facility: CLINIC | Age: 65
End: 2020-10-06
Payer: COMMERCIAL

## 2020-10-06 ENCOUNTER — MED REC SCAN ONLY (OUTPATIENT)
Dept: INTERNAL MEDICINE CLINIC | Facility: CLINIC | Age: 65
End: 2020-10-06

## 2020-10-06 DIAGNOSIS — Z23 NEED FOR VACCINATION: ICD-10-CM

## 2020-10-06 PROCEDURE — 90662 IIV NO PRSV INCREASED AG IM: CPT | Performed by: INTERNAL MEDICINE

## 2020-10-06 PROCEDURE — 90471 IMMUNIZATION ADMIN: CPT | Performed by: INTERNAL MEDICINE

## 2020-11-09 ENCOUNTER — OFFICE VISIT (OUTPATIENT)
Dept: DERMATOLOGY CLINIC | Facility: CLINIC | Age: 65
End: 2020-11-09
Payer: COMMERCIAL

## 2020-11-09 DIAGNOSIS — D48.5 NEOPLASM OF UNCERTAIN BEHAVIOR OF SKIN: Primary | ICD-10-CM

## 2020-11-09 PROCEDURE — 11102 TANGNTL BX SKIN SINGLE LES: CPT | Performed by: DERMATOLOGY

## 2020-11-09 PROCEDURE — 88305 TISSUE EXAM BY PATHOLOGIST: CPT | Performed by: DERMATOLOGY

## 2020-11-09 NOTE — PROGRESS NOTES
Patient presents HPI:     Chief Complaint     Derm Problem        HPI     Derm Problem      Additional comments: LOV 12/5/2019 - Pt presenting for spot of concern on chin only. Pt states it has been present on and off for about 1 year.   Pt states the spot Olopatadine HCl (PATADAY) 0.2 % Ophthalmic Solution 1 drop by Each eye route daily. • triamcinolone acetonide 0.1 % Mouth/Throat Paste Place 1 Tube onto teeth 2 (two) times daily.  For canker sores  In  Mouth (Patient not taking: Reported on 11/9/2020 Transportation needs        Medical: Not on file        Non-medical: Not on file    Tobacco Use      Smoking status: Former Smoker        Packs/day: 0.00        Years: 10.00        Pack years: 0        Types: Cigarettes        Quit date: 1/1/1981        Brandi Bocanegra History Narrative      Not on file    Family History   Problem Relation Age of Onset   • Lipids Father         hyperlipideia   • Heart Disease Father         CAD   • Stroke Father         TIAs   • Dementia Father    • Heart Attack Father         myocardial

## 2020-11-10 ENCOUNTER — TELEPHONE (OUTPATIENT)
Dept: ENDOCRINOLOGY CLINIC | Facility: CLINIC | Age: 65
End: 2020-11-10

## 2020-11-10 DIAGNOSIS — M81.0 AGE-RELATED OSTEOPOROSIS WITHOUT CURRENT PATHOLOGICAL FRACTURE: Primary | ICD-10-CM

## 2020-11-10 NOTE — TELEPHONE ENCOUNTER
Called insurance directly to see if PA is needed for next prolia injection. Was advised PA is not needed. Confirmation #[id number]    Pt is due after 12/06/20 with MD. Castro Duarte to schedule. Booked 12/29. Pt aware to get labs before apt.

## 2020-11-12 NOTE — PROGRESS NOTES
Logged in test results book and pmh. Pt informed of pathology results and LSS' recommendations for treatment. Pt agreeable. Pt provided with contact information for Dr. Mary Molina.

## 2020-11-23 ENCOUNTER — OFFICE VISIT (OUTPATIENT)
Dept: SURGERY | Facility: CLINIC | Age: 65
End: 2020-11-23
Payer: COMMERCIAL

## 2020-11-23 ENCOUNTER — TELEPHONE (OUTPATIENT)
Dept: SURGERY | Facility: CLINIC | Age: 65
End: 2020-11-23

## 2020-11-23 DIAGNOSIS — C44.319 BASAL CELL CARCINOMA (BCC) OF SKIN OF OTHER PART OF FACE: Primary | ICD-10-CM

## 2020-11-23 PROCEDURE — 99072 ADDL SUPL MATRL&STAF TM PHE: CPT | Performed by: PLASTIC SURGERY

## 2020-11-23 PROCEDURE — 99243 OFF/OP CNSLTJ NEW/EST LOW 30: CPT | Performed by: PLASTIC SURGERY

## 2020-11-23 RX ORDER — TRAMADOL HYDROCHLORIDE 50 MG/1
50 TABLET ORAL
Qty: 25 TABLET | Refills: 0 | Status: SHIPPED | OUTPATIENT
Start: 2020-11-23 | End: 2020-12-04

## 2020-11-23 NOTE — H&P
Andi Jacinto is a 76year old female that presents with Patient presents with:  Lesion: BCC to chin   .     REFERRED BY:  Donny Duron    TAKE BP AND BLOOD DRAWS ON LEFT ARM ONLY* PT has Lymphedema to right arm -Per Patient report       Pacemaker: No understanding of all pre-operative teaching. Patient instructed to call the office with any further questions and/or concerns. Patient escorted to surgery scheduling to schedule surgery and post-operative appointments.     PMH:     MEDICAL  Past Medical H daily.     • Albuterol Sulfate HFA (PROAIR HFA) 108 (90 Base) MCG/ACT Inhalation Aero Soln Inhale 2 puffs into the lungs every 4 (four) hours as needed for Wheezing. 1 Inhaler 2   • Cetirizine HCl 5 MG Oral Tab Take 5 mg by mouth daily.      • triamcinolone adenopathy  RESPIRATORY: Inspection - Normal, Effort - Normal  CARDIOVASCULAR: Regular rhythm, No murmurs  ABDOMEN: Inspection - Normal, No abdominal tenderness  NEURO: Memory intact  PSYCH: Oriented to person, place, time, and situation, Appropriate mood

## 2020-11-23 NOTE — H&P (VIEW-ONLY)
Bruce Stewart is a 76year old female that presents with Patient presents with:  Lesion: BCC to chin   .     REFERRED BY:  Diego Duron    TAKE BP AND BLOOD DRAWS ON LEFT ARM ONLY* PT has Lymphedema to right arm -Per Patient report       Pacemaker: No understanding of all pre-operative teaching. Patient instructed to call the office with any further questions and/or concerns. Patient escorted to surgery scheduling to schedule surgery and post-operative appointments.     PMH:     MEDICAL  Past Medical H daily.     • Albuterol Sulfate HFA (PROAIR HFA) 108 (90 Base) MCG/ACT Inhalation Aero Soln Inhale 2 puffs into the lungs every 4 (four) hours as needed for Wheezing. 1 Inhaler 2   • Cetirizine HCl 5 MG Oral Tab Take 5 mg by mouth daily.      • triamcinolone adenopathy  RESPIRATORY: Inspection - Normal, Effort - Normal  CARDIOVASCULAR: Regular rhythm, No murmurs  ABDOMEN: Inspection - Normal, No abdominal tenderness  NEURO: Memory intact  PSYCH: Oriented to person, place, time, and situation, Appropriate mood

## 2020-11-29 ENCOUNTER — APPOINTMENT (OUTPATIENT)
Dept: LAB | Facility: HOSPITAL | Age: 65
End: 2020-11-29
Attending: PLASTIC SURGERY
Payer: COMMERCIAL

## 2020-11-29 DIAGNOSIS — Z01.818 PREOP TESTING: ICD-10-CM

## 2020-11-30 DIAGNOSIS — J45.20 MILD INTERMITTENT ASTHMA, UNSPECIFIED WHETHER COMPLICATED: ICD-10-CM

## 2020-12-01 ENCOUNTER — ANESTHESIA EVENT (OUTPATIENT)
Dept: SURGERY | Facility: HOSPITAL | Age: 65
End: 2020-12-01
Payer: COMMERCIAL

## 2020-12-01 ENCOUNTER — HOSPITAL DOCUMENTATION (OUTPATIENT)
Dept: SURGERY | Facility: CLINIC | Age: 65
End: 2020-12-01

## 2020-12-01 ENCOUNTER — HOSPITAL ENCOUNTER (OUTPATIENT)
Facility: HOSPITAL | Age: 65
Setting detail: HOSPITAL OUTPATIENT SURGERY
Discharge: HOME | End: 2020-12-01
Attending: PLASTIC SURGERY | Admitting: PLASTIC SURGERY
Payer: COMMERCIAL

## 2020-12-01 ENCOUNTER — ANESTHESIA (OUTPATIENT)
Dept: SURGERY | Facility: HOSPITAL | Age: 65
End: 2020-12-01
Payer: COMMERCIAL

## 2020-12-01 VITALS
TEMPERATURE: 98 F | HEART RATE: 66 BPM | OXYGEN SATURATION: 96 % | RESPIRATION RATE: 16 BRPM | WEIGHT: 167 LBS | DIASTOLIC BLOOD PRESSURE: 46 MMHG | SYSTOLIC BLOOD PRESSURE: 126 MMHG | HEIGHT: 63.5 IN | BODY MASS INDEX: 29.22 KG/M2

## 2020-12-01 DIAGNOSIS — Z01.818 PREOP TESTING: Primary | ICD-10-CM

## 2020-12-01 DIAGNOSIS — C44.319 BASAL CELL CARCINOMA OF CHIN: ICD-10-CM

## 2020-12-01 DIAGNOSIS — C44.319 BASAL CELL CARCINOMA (BCC) OF SKIN OF OTHER PART OF FACE: Primary | ICD-10-CM

## 2020-12-01 PROCEDURE — 14040 TIS TRNFR F/C/C/M/N/A/G/H/F: CPT | Performed by: PLASTIC SURGERY

## 2020-12-01 PROCEDURE — 0HX1XZZ TRANSFER FACE SKIN, EXTERNAL APPROACH: ICD-10-PCS | Performed by: PLASTIC SURGERY

## 2020-12-01 RX ORDER — ONDANSETRON 2 MG/ML
INJECTION INTRAMUSCULAR; INTRAVENOUS AS NEEDED
Status: DISCONTINUED | OUTPATIENT
Start: 2020-12-01 | End: 2020-12-01 | Stop reason: SURG

## 2020-12-01 RX ORDER — ONDANSETRON 2 MG/ML
4 INJECTION INTRAMUSCULAR; INTRAVENOUS ONCE AS NEEDED
Status: DISCONTINUED | OUTPATIENT
Start: 2020-12-01 | End: 2020-12-01

## 2020-12-01 RX ORDER — HYDROCODONE BITARTRATE AND ACETAMINOPHEN 5; 325 MG/1; MG/1
2 TABLET ORAL AS NEEDED
Status: DISCONTINUED | OUTPATIENT
Start: 2020-12-01 | End: 2020-12-01

## 2020-12-01 RX ORDER — SODIUM CHLORIDE, SODIUM LACTATE, POTASSIUM CHLORIDE, CALCIUM CHLORIDE 600; 310; 30; 20 MG/100ML; MG/100ML; MG/100ML; MG/100ML
INJECTION, SOLUTION INTRAVENOUS CONTINUOUS
Status: DISCONTINUED | OUTPATIENT
Start: 2020-12-01 | End: 2020-12-01

## 2020-12-01 RX ORDER — KETOROLAC TROMETHAMINE 30 MG/ML
INJECTION, SOLUTION INTRAMUSCULAR; INTRAVENOUS AS NEEDED
Status: DISCONTINUED | OUTPATIENT
Start: 2020-12-01 | End: 2020-12-01 | Stop reason: SURG

## 2020-12-01 RX ORDER — LIDOCAINE HYDROCHLORIDE 10 MG/ML
INJECTION, SOLUTION EPIDURAL; INFILTRATION; INTRACAUDAL; PERINEURAL AS NEEDED
Status: DISCONTINUED | OUTPATIENT
Start: 2020-12-01 | End: 2020-12-01 | Stop reason: SURG

## 2020-12-01 RX ORDER — HYDROMORPHONE HYDROCHLORIDE 1 MG/ML
0.4 INJECTION, SOLUTION INTRAMUSCULAR; INTRAVENOUS; SUBCUTANEOUS EVERY 5 MIN PRN
Status: DISCONTINUED | OUTPATIENT
Start: 2020-12-01 | End: 2020-12-01

## 2020-12-01 RX ORDER — ROCURONIUM BROMIDE 10 MG/ML
INJECTION, SOLUTION INTRAVENOUS AS NEEDED
Status: DISCONTINUED | OUTPATIENT
Start: 2020-12-01 | End: 2020-12-01 | Stop reason: SURG

## 2020-12-01 RX ORDER — NEOSTIGMINE METHYLSULFATE 1 MG/ML
INJECTION INTRAVENOUS AS NEEDED
Status: DISCONTINUED | OUTPATIENT
Start: 2020-12-01 | End: 2020-12-01 | Stop reason: SURG

## 2020-12-01 RX ORDER — HYDROMORPHONE HYDROCHLORIDE 1 MG/ML
0.6 INJECTION, SOLUTION INTRAMUSCULAR; INTRAVENOUS; SUBCUTANEOUS EVERY 5 MIN PRN
Status: DISCONTINUED | OUTPATIENT
Start: 2020-12-01 | End: 2020-12-01

## 2020-12-01 RX ORDER — HALOPERIDOL 5 MG/ML
0.25 INJECTION INTRAMUSCULAR ONCE AS NEEDED
Status: DISCONTINUED | OUTPATIENT
Start: 2020-12-01 | End: 2020-12-01

## 2020-12-01 RX ORDER — PROCHLORPERAZINE EDISYLATE 5 MG/ML
5 INJECTION INTRAMUSCULAR; INTRAVENOUS ONCE AS NEEDED
Status: DISCONTINUED | OUTPATIENT
Start: 2020-12-01 | End: 2020-12-01

## 2020-12-01 RX ORDER — MORPHINE SULFATE 4 MG/ML
2 INJECTION, SOLUTION INTRAMUSCULAR; INTRAVENOUS EVERY 10 MIN PRN
Status: DISCONTINUED | OUTPATIENT
Start: 2020-12-01 | End: 2020-12-01

## 2020-12-01 RX ORDER — NALOXONE HYDROCHLORIDE 0.4 MG/ML
80 INJECTION, SOLUTION INTRAMUSCULAR; INTRAVENOUS; SUBCUTANEOUS AS NEEDED
Status: DISCONTINUED | OUTPATIENT
Start: 2020-12-01 | End: 2020-12-01

## 2020-12-01 RX ORDER — MIDAZOLAM HYDROCHLORIDE 1 MG/ML
INJECTION INTRAMUSCULAR; INTRAVENOUS AS NEEDED
Status: DISCONTINUED | OUTPATIENT
Start: 2020-12-01 | End: 2020-12-01 | Stop reason: SURG

## 2020-12-01 RX ORDER — LIDOCAINE HYDROCHLORIDE AND EPINEPHRINE 5; 5 MG/ML; UG/ML
INJECTION, SOLUTION INFILTRATION; PERINEURAL AS NEEDED
Status: DISCONTINUED | OUTPATIENT
Start: 2020-12-01 | End: 2020-12-01 | Stop reason: HOSPADM

## 2020-12-01 RX ORDER — DEXAMETHASONE SODIUM PHOSPHATE 4 MG/ML
VIAL (ML) INJECTION AS NEEDED
Status: DISCONTINUED | OUTPATIENT
Start: 2020-12-01 | End: 2020-12-01 | Stop reason: SURG

## 2020-12-01 RX ORDER — GLYCOPYRROLATE 0.2 MG/ML
INJECTION, SOLUTION INTRAMUSCULAR; INTRAVENOUS AS NEEDED
Status: DISCONTINUED | OUTPATIENT
Start: 2020-12-01 | End: 2020-12-01 | Stop reason: SURG

## 2020-12-01 RX ORDER — MORPHINE SULFATE 4 MG/ML
4 INJECTION, SOLUTION INTRAMUSCULAR; INTRAVENOUS EVERY 10 MIN PRN
Status: DISCONTINUED | OUTPATIENT
Start: 2020-12-01 | End: 2020-12-01

## 2020-12-01 RX ORDER — HYDROCODONE BITARTRATE AND ACETAMINOPHEN 5; 325 MG/1; MG/1
1 TABLET ORAL AS NEEDED
Status: DISCONTINUED | OUTPATIENT
Start: 2020-12-01 | End: 2020-12-01

## 2020-12-01 RX ORDER — MORPHINE SULFATE 10 MG/ML
6 INJECTION, SOLUTION INTRAMUSCULAR; INTRAVENOUS EVERY 10 MIN PRN
Status: DISCONTINUED | OUTPATIENT
Start: 2020-12-01 | End: 2020-12-01

## 2020-12-01 RX ORDER — POLYETHYLENE GLYCOL 3350 17 G/17G
POWDER, FOR SOLUTION ORAL
COMMUNITY

## 2020-12-01 RX ORDER — HYDROMORPHONE HYDROCHLORIDE 1 MG/ML
0.2 INJECTION, SOLUTION INTRAMUSCULAR; INTRAVENOUS; SUBCUTANEOUS EVERY 5 MIN PRN
Status: DISCONTINUED | OUTPATIENT
Start: 2020-12-01 | End: 2020-12-01

## 2020-12-01 RX ORDER — ACETAMINOPHEN 500 MG
1000 TABLET ORAL ONCE
Status: COMPLETED | OUTPATIENT
Start: 2020-12-01 | End: 2020-12-01

## 2020-12-01 RX ORDER — TRAMADOL HYDROCHLORIDE 50 MG/1
50 TABLET ORAL EVERY 6 HOURS PRN
Status: DISCONTINUED | OUTPATIENT
Start: 2020-12-01 | End: 2020-12-01

## 2020-12-01 RX ADMIN — LIDOCAINE HYDROCHLORIDE 50 MG: 10 INJECTION, SOLUTION EPIDURAL; INFILTRATION; INTRACAUDAL; PERINEURAL at 12:17:00

## 2020-12-01 RX ADMIN — ONDANSETRON 4 MG: 2 INJECTION INTRAMUSCULAR; INTRAVENOUS at 13:30:00

## 2020-12-01 RX ADMIN — MIDAZOLAM HYDROCHLORIDE 2 MG: 1 INJECTION INTRAMUSCULAR; INTRAVENOUS at 12:13:00

## 2020-12-01 RX ADMIN — ROCURONIUM BROMIDE 40 MG: 10 INJECTION, SOLUTION INTRAVENOUS at 12:18:00

## 2020-12-01 RX ADMIN — GLYCOPYRROLATE 0.7 MG: 0.2 INJECTION, SOLUTION INTRAMUSCULAR; INTRAVENOUS at 13:32:00

## 2020-12-01 RX ADMIN — SODIUM CHLORIDE, SODIUM LACTATE, POTASSIUM CHLORIDE, CALCIUM CHLORIDE: 600; 310; 30; 20 INJECTION, SOLUTION INTRAVENOUS at 13:47:00

## 2020-12-01 RX ADMIN — DEXAMETHASONE SODIUM PHOSPHATE 4 MG: 4 MG/ML VIAL (ML) INJECTION at 12:39:00

## 2020-12-01 RX ADMIN — NEOSTIGMINE METHYLSULFATE 3.5 MG: 1 INJECTION INTRAVENOUS at 13:32:00

## 2020-12-01 RX ADMIN — KETOROLAC TROMETHAMINE 30 MG: 30 INJECTION, SOLUTION INTRAMUSCULAR; INTRAVENOUS at 13:31:00

## 2020-12-01 NOTE — ANESTHESIA POSTPROCEDURE EVALUATION
Patient: John Paul Ray    Procedure Summary     Date: 12/01/20 Room / Location: [facility] MAIN OR [address] [place] [address] MAIN OR    Anesthesia Start: 2414 Anesthesia Stop: 2288    Procedure: EXCISION LESION HEAD/NECK/FACE (Right ) Diagnosis: (basal cell carcinoma)    Surge

## 2020-12-01 NOTE — BRIEF OP NOTE
Pre-Operative Diagnosis: basal cell carcinoma     Post-Operative Diagnosis: basal cell carcinoma      Procedure Performed:   Procedure(s):  wide excision lesion(s) right chin, frozen section, flap reconstruction    Surgeon(s) and Role:     * Oern WILL

## 2020-12-01 NOTE — ANESTHESIA PROCEDURE NOTES
Airway  Date/Time: 12/1/2020 12:20 PM  Urgency: Elective    Airway not difficult    General Information and Staff    Patient location during procedure: OR  Anesthesiologist: Mario Yousif MD  Resident/CRNA: Mona Goodwin CRNA  Performed: CRNA     I

## 2020-12-01 NOTE — ANESTHESIA PREPROCEDURE EVALUATION
Anesthesia PreOp Note    HPI:     Stacy Ledesma is a 76year old female who presents for preoperative consultation requested by: Shweta Ellis MD    Date of Surgery: 12/1/2020    Procedure(s):  EXCISION LESION HEAD/NECK/FACE  Indication: basal • Allergic rhinitis    • Asthma    • Basal cell carcinoma 2002    excision- back   • BCC (basal cell carcinoma) 11/2020    Right chin   • Breast cancer ([address] 9/11/2007 9/19/07(right breast lumpectomy with sentinel LN biopsy) 2.0 cm infiltrating poorly d •  Vitamin D3 (VITAMIN D3) 2000 UNITS Oral Cap, Take 2,000 Units by mouth daily. , Disp: , Rfl: , 11/24/2020    •  Olopatadine HCl (PATADAY) 0.2 % Ophthalmic Solution, 1 drop by Each eye route daily.   , Disp: , Rfl: , Past Week at Unknown time    •  Kiki • Asthma Mother    • Breast Cancer Mother [id number]   • Cancer Mother    • Other (Other) Mother    • Other (copd) Mother         cause of death- passed at age 80   • Alcohol and Other Disorders Associated Other         alcoholism grandmother   • Skin cancer Siste Other Topics      Concerns:         Service: Not Asked        Blood Transfusions: Not Asked        Caffeine Concern: Yes          chocolate--Per NG no        Occupational Exposure: Not Asked        Hobby Hazards: Not Asked        Sleep Concern: N (+) asthma,   Cardiovascular - negative ROS and normal exam    Neuro/Psych    (+)  neuromuscular disease,       GI/Hepatic/Renal    (+) GERD,     Endo/Other    Abdominal  - normal exam               Anesthesia Plan:   ASA:  3  Plan:   General  Informed Con

## 2020-12-01 NOTE — INTERVAL H&P NOTE
Pre-op Diagnosis: basal cell carcinoma    The above referenced H&P was reviewed by Aurelio Hurtado MD on 12/1/2020, the patient was examined and no significant changes have occurred in the patient's condition since the H&P was performed.   I discusse

## 2020-12-02 ENCOUNTER — TELEPHONE (OUTPATIENT)
Dept: SURGERY | Facility: CLINIC | Age: 65
End: 2020-12-02

## 2020-12-02 RX ORDER — ALBUTEROL SULFATE 90 UG/1
2 AEROSOL, METERED RESPIRATORY (INHALATION) EVERY 4 HOURS PRN
Qty: 1 INHALER | Refills: 2 | Status: SHIPPED | OUTPATIENT
Start: 2020-12-02

## 2020-12-02 NOTE — TELEPHONE ENCOUNTER
Spoke with pt. Denies pain and has not taken any analgesics or tramadol. Steri-strip intact with small amount of bleeding from site earlier today, that has subsided. Told she may shower but do not rub area and do not submerge in water.   C/o bilateral ch

## 2020-12-02 NOTE — OPERATIVE REPORT
[facility]    PATIENT'S NAME: Nelly Addison   ATTENDING PHYSICIAN: Martín Edward MD   OPERATING PHYSICIAN: Martín Edward MD   PATIENT ACCOUNT#:   [de-identified]    LOCATION:  [facility] [address] PACU 9 [facility] 10  MEDICAL RECORD #:   [id number] applied. The patient tolerated the procedure well and left the operating suite in satisfactory condition.     Dictated By Isa Zarco MD  d: 12/01/2020 13:46:26  t: 12/01/2020 14:27:01  [facility] [id number]  UL/    cc: Isa Zarco

## 2020-12-02 NOTE — TELEPHONE ENCOUNTER
Pt had WE LESION R CHIN,FLAP RECONS sx yesterday (12/01/2020). She adds that she spoke to RN this AM and was feeling fine. Around noon her face is turned red and warm, but she does not have a temperature? She adds she has not been taking tramadol,but has had this reaction to a steroid before. Pt states is this normal?    Please call back and advise.   [phone]

## 2020-12-03 ENCOUNTER — TELEMEDICINE (OUTPATIENT)
Dept: INTERNAL MEDICINE CLINIC | Facility: CLINIC | Age: 65
End: 2020-12-03

## 2020-12-03 ENCOUNTER — TELEPHONE (OUTPATIENT)
Dept: INTERNAL MEDICINE CLINIC | Facility: CLINIC | Age: 65
End: 2020-12-03

## 2020-12-03 DIAGNOSIS — Z85.828 PERSONAL HISTORY OF SKIN CANCER: Primary | ICD-10-CM

## 2020-12-03 DIAGNOSIS — L30.9 DERMATITIS: ICD-10-CM

## 2020-12-03 PROCEDURE — 99213 OFFICE O/P EST LOW 20 MIN: CPT | Performed by: INTERNAL MEDICINE

## 2020-12-03 NOTE — PROGRESS NOTES
Telemedicine Note    Virtual Video Check-In    Bruce Stewart verbally consents to a Virtual Video Check-In service on 12/03/20.  Patient understands and accepts financial responsibility for any deductible, co-insurance and/or co-pays associated with this • Allergic rhinitis    • Asthma    • Basal cell carcinoma 2002    excision- back   • Basal cell carcinoma of chin 11/23/2020    Wide excision lesion to Right chin BCC / VY flap   • BCC (basal cell carcinoma) 11/2020    Right chin   • Breast cancer ([facility] [address] neoplasm of right breast in female, estrogen receptor negative ([facility] [address]     Personal history of skin cancer     Inflamed seborrheic keratosis     Allergic rhinitis due to pollen     Mild intermittent asthma without complication     Pure hypercholesterolemia breakfast.     • Vitamin D3 (VITAMIN D3) 2000 UNITS Oral Cap Take 2,000 Units by mouth daily. • Olopatadine HCl (PATADAY) 0.2 % Ophthalmic Solution 1 drop by Each eye route daily.            Perfumes                ASTHMA    Comment:Floral scents and surgeon    Healing well surgical strips in place  Patient will see Dr. David Munson . tomorrow morning for recheck of the wound.     Dermatitis of the skin  Mild erythema of the cheeks  Improving with Zyrtec patient-to continue taking it for 1 to 2 weeks and then as n

## 2020-12-04 ENCOUNTER — NURSE ONLY (OUTPATIENT)
Dept: SURGERY | Facility: CLINIC | Age: 65
End: 2020-12-04
Payer: COMMERCIAL

## 2020-12-04 DIAGNOSIS — C44.319 BASAL CELL CARCINOMA (BCC) OF SKIN OF OTHER PART OF FACE: ICD-10-CM

## 2020-12-04 DIAGNOSIS — Z48.02 ENCOUNTER FOR REMOVAL OF SUTURES: Primary | ICD-10-CM

## 2020-12-04 NOTE — PROGRESS NOTES
Surgery 1: R chin BCC / VY flap  - Date: 12/01/20  - Days Since: 3    Patient here for suture removal to right chin. Patient identified with 2 identifiers and orders verified. Patient presents w/steri-strip clean, dry, intact.   Patient denies pain, use o

## 2020-12-11 RX ORDER — MONTELUKAST SODIUM 10 MG/1
10 TABLET ORAL NIGHTLY
Qty: 90 TABLET | Refills: 3 | Status: SHIPPED | OUTPATIENT
Start: 2020-12-11 | End: 2022-01-20

## 2020-12-15 ENCOUNTER — TELEPHONE (OUTPATIENT)
Dept: SURGERY | Facility: CLINIC | Age: 65
End: 2020-12-15

## 2020-12-15 ENCOUNTER — PATIENT MESSAGE (OUTPATIENT)
Dept: SURGERY | Facility: CLINIC | Age: 65
End: 2020-12-15

## 2020-12-15 NOTE — TELEPHONE ENCOUNTER
Spoke with pt. No pain, no more drainage, no swelling or s/s of infection. Pt told picture looked like scar was healing well and she shouldn't be concerned scar bled a little bit during the night.   Instructed to hold Eucerin massage today then restart Eu

## 2020-12-15 NOTE — TELEPHONE ENCOUNTER
From: Danial Mckeon  To: Yenifer Fishman MD  Sent: 12/15/2020 9:48 AM CST  Subject: Other    I've left a message for a callback, but wanted to forward a pic of my surgery area.   Some background:  My surgery was on Dec 1, 2020  My stitches were re

## 2020-12-15 NOTE — TELEPHONE ENCOUNTER
Pt had WE LESION R CHIN,FLAP RECONS sx on 12/01/2020. Pt calling to state she has being applying Eucerin daily per the RN instructions. Pt states she woke this morning and there was some bleeding on her chin? Pt says \"is this normal?    She adds she has not applied any Eucerin today, and that she will send a picture via My Chart. Pt informed RN will be in after 1130, she verbalized understanding and will wait for a call back this afternoon.      [phone]

## 2020-12-20 ENCOUNTER — LAB ENCOUNTER (OUTPATIENT)
Dept: LAB | Facility: HOSPITAL | Age: 65
End: 2020-12-20
Attending: INTERNAL MEDICINE
Payer: COMMERCIAL

## 2020-12-20 DIAGNOSIS — M81.0 AGE-RELATED OSTEOPOROSIS WITHOUT CURRENT PATHOLOGICAL FRACTURE: ICD-10-CM

## 2020-12-20 PROCEDURE — 80048 BASIC METABOLIC PNL TOTAL CA: CPT

## 2020-12-20 PROCEDURE — 82306 VITAMIN D 25 HYDROXY: CPT

## 2020-12-20 PROCEDURE — 84080 ASSAY ALKALINE PHOSPHATASES: CPT

## 2020-12-20 PROCEDURE — 36415 COLL VENOUS BLD VENIPUNCTURE: CPT

## 2020-12-29 ENCOUNTER — OFFICE VISIT (OUTPATIENT)
Dept: ENDOCRINOLOGY CLINIC | Facility: CLINIC | Age: 65
End: 2020-12-29
Payer: COMMERCIAL

## 2020-12-29 VITALS
WEIGHT: 170 LBS | SYSTOLIC BLOOD PRESSURE: 158 MMHG | RESPIRATION RATE: 18 BRPM | HEART RATE: 76 BPM | HEIGHT: 63.5 IN | DIASTOLIC BLOOD PRESSURE: 100 MMHG | BODY MASS INDEX: 29.75 KG/M2

## 2020-12-29 DIAGNOSIS — M81.0 AGE-RELATED OSTEOPOROSIS WITHOUT CURRENT PATHOLOGICAL FRACTURE: Primary | ICD-10-CM

## 2020-12-29 PROCEDURE — 3077F SYST BP >= 140 MM HG: CPT | Performed by: INTERNAL MEDICINE

## 2020-12-29 PROCEDURE — 3080F DIAST BP >= 90 MM HG: CPT | Performed by: INTERNAL MEDICINE

## 2020-12-29 PROCEDURE — 99213 OFFICE O/P EST LOW 20 MIN: CPT | Performed by: INTERNAL MEDICINE

## 2020-12-29 PROCEDURE — 3008F BODY MASS INDEX DOCD: CPT | Performed by: INTERNAL MEDICINE

## 2020-12-29 PROCEDURE — 96372 THER/PROPH/DIAG INJ SC/IM: CPT | Performed by: INTERNAL MEDICINE

## 2020-12-29 NOTE — PROGRESS NOTES
Return Office Visit     CHIEF COMPLAINT:  Patient presents with:  Osteoporosis: follow up and prolia. Patient reports no falls or breaks.         HISTORY OF PRESENT ILLNESS:  Danial Mckeon is a 76year old female who presents for follow up for [facility] Cetirizine HCl 5 MG Oral Tab Take 10 mg by mouth daily. • triamcinolone acetonide 0.1 % Mouth/Throat Paste Place 1 Tube onto teeth 2 (two) times daily.  For canker sores  In  Mouth (Patient taking differently: Place 1 Tube onto teeth 2 (two) times donna Negative for: polyuria, polydypsia  Skin: Negative for: rash, blister, cellulitis,       PHYSICAL EXAM:    12/29/20  1409   BP: (!) 170/92   Pulse: 82   Resp: 18   Weight: 170 lb (77.1 kg)   Height: 5' 3.5\" (1.613 m)     BMI: Body mass index is 29.64 kg/m                Chico Jefferson MD

## 2021-03-02 ENCOUNTER — OFFICE VISIT (OUTPATIENT)
Dept: INTERNAL MEDICINE CLINIC | Facility: CLINIC | Age: 66
End: 2021-03-02
Payer: COMMERCIAL

## 2021-03-02 VITALS
HEIGHT: 63.5 IN | DIASTOLIC BLOOD PRESSURE: 82 MMHG | SYSTOLIC BLOOD PRESSURE: 135 MMHG | HEART RATE: 82 BPM | WEIGHT: 171 LBS | BODY MASS INDEX: 29.92 KG/M2

## 2021-03-02 DIAGNOSIS — M81.0 AGE-RELATED OSTEOPOROSIS WITHOUT CURRENT PATHOLOGICAL FRACTURE: ICD-10-CM

## 2021-03-02 DIAGNOSIS — M17.0 PRIMARY OSTEOARTHRITIS OF BOTH KNEES: ICD-10-CM

## 2021-03-02 DIAGNOSIS — E78.00 PURE HYPERCHOLESTEROLEMIA: ICD-10-CM

## 2021-03-02 DIAGNOSIS — D48.5 NEOPLASM OF UNCERTAIN BEHAVIOR OF SKIN: Primary | ICD-10-CM

## 2021-03-02 DIAGNOSIS — Z00.00 MEDICARE ANNUAL WELLNESS VISIT, SUBSEQUENT: ICD-10-CM

## 2021-03-02 DIAGNOSIS — Z85.828 PERSONAL HISTORY OF SKIN CANCER: ICD-10-CM

## 2021-03-02 DIAGNOSIS — J45.20 MILD INTERMITTENT ASTHMA WITHOUT COMPLICATION: ICD-10-CM

## 2021-03-02 DIAGNOSIS — C50.411 MALIGNANT NEOPLASM OF UPPER-OUTER QUADRANT OF RIGHT BREAST IN FEMALE, ESTROGEN RECEPTOR NEGATIVE (HCC): ICD-10-CM

## 2021-03-02 DIAGNOSIS — M62.9 HAMSTRING TIGHTNESS OF BOTH LOWER EXTREMITIES: ICD-10-CM

## 2021-03-02 DIAGNOSIS — G47.9 SLEEP DISTURBANCE: ICD-10-CM

## 2021-03-02 DIAGNOSIS — Z17.1 MALIGNANT NEOPLASM OF UPPER-OUTER QUADRANT OF RIGHT BREAST IN FEMALE, ESTROGEN RECEPTOR NEGATIVE (HCC): ICD-10-CM

## 2021-03-02 PROBLEM — L72.3 INFECTED SEBACEOUS CYST: Status: RESOLVED | Noted: 2017-09-06 | Resolved: 2021-03-02

## 2021-03-02 PROBLEM — R92.2 DENSE BREASTS: Status: RESOLVED | Noted: 2017-08-31 | Resolved: 2021-03-02

## 2021-03-02 PROBLEM — L08.9 INFECTED SEBACEOUS CYST: Status: RESOLVED | Noted: 2017-09-06 | Resolved: 2021-03-02

## 2021-03-02 PROBLEM — Z12.31 SCREENING MAMMOGRAM, ENCOUNTER FOR: Status: RESOLVED | Noted: 2017-08-31 | Resolved: 2021-03-02

## 2021-03-02 PROBLEM — R92.30 DENSE BREASTS: Status: RESOLVED | Noted: 2017-08-31 | Resolved: 2021-03-02

## 2021-03-02 PROCEDURE — 3075F SYST BP GE 130 - 139MM HG: CPT | Performed by: INTERNAL MEDICINE

## 2021-03-02 PROCEDURE — 3079F DIAST BP 80-89 MM HG: CPT | Performed by: INTERNAL MEDICINE

## 2021-03-02 PROCEDURE — G0439 PPPS, SUBSEQ VISIT: HCPCS | Performed by: INTERNAL MEDICINE

## 2021-03-02 PROCEDURE — 96160 PT-FOCUSED HLTH RISK ASSMT: CPT | Performed by: INTERNAL MEDICINE

## 2021-03-02 PROCEDURE — 99397 PER PM REEVAL EST PAT 65+ YR: CPT | Performed by: INTERNAL MEDICINE

## 2021-03-02 PROCEDURE — 3008F BODY MASS INDEX DOCD: CPT | Performed by: INTERNAL MEDICINE

## 2021-03-02 NOTE — PROGRESS NOTES
HPI:   Jessica Patel is a 78year old female who presents for a Medicare Subsequent Annual Wellness visit (Pt already had Initial Annual Wellness).     Patient presents today for physical exam, states doing well otherwise, denies chest pain, shortness of Used         CAGE Alcohol screening   [facility] was screened for Alcohol abuse and had a score of 0 so is at low risk.     Patient Care Team: Patient Care Team:  Rich Mcnair MD as PCP - General (Internal Medicine)  Caesar Casillas MD (OBSTETRICS tablet (10 mg total) by mouth nightly. Albuterol Sulfate HFA (PROAIR HFA) 108 (90 Base) MCG/ACT Inhalation Aero Soln, Inhale 2 puffs into the lungs every 4 (four) hours as needed for Wheezing. PEG 3350 17 g Oral Powd Pack, Take by mouth.  Take every 2-3 d father; Lipids in her brother and father; Other in her mother; Skin cancer in her sister; Stroke in her father; Thyroid disease in her mother; copd in her mother. SOCIAL HISTORY:   She  reports that she quit smoking about 41 years ago.  Her smoking use in ears and canals normal,TM normal mouth covered by surgical mask due to COVID-19 pandemic    EYES:PERRLA, EOMI, - normal ,conjunctiva are clear   NECK: supple,no adenopathy,no bruits, no thyromegaly and no JVD  LUNGS: clear to auscultation bilaterally no wh saturated fat and low sugar diet  Keep good hydration  Maintain a regular activity /walking as tolerated   Complete labs as ordered,     Preventative health maintenance tests reviewed  Mammogram  In  6/21  With Dr Leonora Oliva ,  dexa  Scan 2019 patient is on Pro have you lost more than 10 pounds without trying?: 2 - No  Has your appetite been poor?: No  How does the patient maintain a good energy level?: Appropriate Exercise  How would you describe your daily physical activity?: Light  How would you describe your as discussed Mammogram due on 06/18/2022 Update Health Maintenance if applicable     Immunizations (Update Immunization Activity if applicable)     Influenza  Covered Annually 10/12/2021 Please get every year    Pneumococcal 13 (Prevnar)  Covered Once afte

## 2021-03-16 ENCOUNTER — PATIENT MESSAGE (OUTPATIENT)
Dept: INTERNAL MEDICINE CLINIC | Facility: CLINIC | Age: 66
End: 2021-03-16

## 2021-03-17 NOTE — TELEPHONE ENCOUNTER
From: Angelo Aponte  To: Judy Barry MD  Sent: 3/16/2021 6:54 PM CDT  Subject: Other    I've gotten my 2nd Covid-19 vaccine today, again the [employer].  Can you add it to my record in [address]?    (I couldn't figure out how to add it.)  thank

## 2021-04-15 ENCOUNTER — OFFICE VISIT (OUTPATIENT)
Dept: DERMATOLOGY CLINIC | Facility: CLINIC | Age: 66
End: 2021-04-15
Payer: COMMERCIAL

## 2021-04-15 DIAGNOSIS — L81.4 SOLAR LENTIGO: ICD-10-CM

## 2021-04-15 DIAGNOSIS — Z85.828 PERSONAL HISTORY OF SKIN CANCER: Primary | ICD-10-CM

## 2021-04-15 DIAGNOSIS — Z87.2 HISTORY OF ACTINIC KERATOSES: ICD-10-CM

## 2021-04-15 DIAGNOSIS — L82.1 SEBORRHEIC KERATOSES: ICD-10-CM

## 2021-04-15 DIAGNOSIS — L72.0 EPIDERMAL CYST: ICD-10-CM

## 2021-04-15 DIAGNOSIS — D22.9 MULTIPLE MELANOCYTIC NEVI: ICD-10-CM

## 2021-04-15 PROCEDURE — 99213 OFFICE O/P EST LOW 20 MIN: CPT | Performed by: DERMATOLOGY

## 2021-05-12 ENCOUNTER — HOSPITAL ENCOUNTER (OUTPATIENT)
Dept: GENERAL RADIOLOGY | Age: 66
Discharge: HOME | End: 2021-05-12
Attending: INTERNAL MEDICINE
Payer: MEDICARE

## 2021-05-12 ENCOUNTER — OFFICE VISIT (OUTPATIENT)
Dept: INTERNAL MEDICINE CLINIC | Facility: CLINIC | Age: 66
End: 2021-05-12
Payer: COMMERCIAL

## 2021-05-12 VITALS
HEIGHT: 63.5 IN | BODY MASS INDEX: 29.92 KG/M2 | SYSTOLIC BLOOD PRESSURE: 128 MMHG | OXYGEN SATURATION: 96 % | DIASTOLIC BLOOD PRESSURE: 85 MMHG | WEIGHT: 171 LBS | HEART RATE: 76 BPM

## 2021-05-12 DIAGNOSIS — M25.531 WRIST PAIN, RIGHT: ICD-10-CM

## 2021-05-12 DIAGNOSIS — M25.522 ELBOW PAIN, CHRONIC, LEFT: Primary | ICD-10-CM

## 2021-05-12 DIAGNOSIS — G89.29 ELBOW PAIN, CHRONIC, LEFT: ICD-10-CM

## 2021-05-12 DIAGNOSIS — M25.522 ELBOW PAIN, CHRONIC, LEFT: ICD-10-CM

## 2021-05-12 DIAGNOSIS — Z23 NEED FOR VACCINATION FOR PNEUMOCOCCUS: ICD-10-CM

## 2021-05-12 DIAGNOSIS — M25.532 LEFT WRIST PAIN: ICD-10-CM

## 2021-05-12 DIAGNOSIS — G89.29 ELBOW PAIN, CHRONIC, LEFT: Primary | ICD-10-CM

## 2021-05-12 PROCEDURE — 3074F SYST BP LT 130 MM HG: CPT | Performed by: INTERNAL MEDICINE

## 2021-05-12 PROCEDURE — 3008F BODY MASS INDEX DOCD: CPT | Performed by: INTERNAL MEDICINE

## 2021-05-12 PROCEDURE — 73110 X-RAY EXAM OF WRIST: CPT | Performed by: INTERNAL MEDICINE

## 2021-05-12 PROCEDURE — 73080 X-RAY EXAM OF ELBOW: CPT | Performed by: INTERNAL MEDICINE

## 2021-05-12 PROCEDURE — 99214 OFFICE O/P EST MOD 30 MIN: CPT | Performed by: INTERNAL MEDICINE

## 2021-05-12 PROCEDURE — G0009 ADMIN PNEUMOCOCCAL VACCINE: HCPCS | Performed by: INTERNAL MEDICINE

## 2021-05-12 PROCEDURE — 3079F DIAST BP 80-89 MM HG: CPT | Performed by: INTERNAL MEDICINE

## 2021-05-12 PROCEDURE — 90732 PPSV23 VACC 2 YRS+ SUBQ/IM: CPT | Performed by: INTERNAL MEDICINE

## 2021-05-12 NOTE — PROGRESS NOTES
HPI/Subjective:   Patient ID: Hao Kirkpatrick is a 78year old female. Patient presents with:  Elbow Pain: C/o left elbow pain for about 2-3 months. Recalls no recent injuries. Has used voltaren gel with some relief.   Hand Pain: C/o on/off pain on top Psychiatric/Behavioral: Negative for confusion. The patient is not nervous/anxious. Current Outpatient Medications   Medication Sig Dispense Refill   • Montelukast Sodium 10 MG Oral Tab Take 1 tablet (10 mg total) by mouth nightly.  90 tablet 3   • PEG and external ear normal.      Left Ear: Tympanic membrane, ear canal and external ear normal.      Nose:      Right Sinus: No maxillary sinus tenderness or frontal sinus tenderness.       Left Sinus: No maxillary sinus tenderness or frontal sinus tenderness Wrist pain, left  Rest  Avoid lifting   xr  Wrist left  Left wrist brace  Refer  To physical th   Patient education patient denies any history of any trauma  To avoid typing  Orders Placed This Encounter      PNEUMOCOCCAL IMM, 23      Meds This Visit:

## 2021-05-19 ENCOUNTER — OFFICE VISIT (OUTPATIENT)
Dept: NEUROLOGY | Facility: CLINIC | Age: 66
End: 2021-05-19
Payer: COMMERCIAL

## 2021-05-19 ENCOUNTER — TELEPHONE (OUTPATIENT)
Dept: INTERNAL MEDICINE CLINIC | Facility: CLINIC | Age: 66
End: 2021-05-19

## 2021-05-19 VITALS
BODY MASS INDEX: 30.65 KG/M2 | WEIGHT: 173 LBS | SYSTOLIC BLOOD PRESSURE: 130 MMHG | DIASTOLIC BLOOD PRESSURE: 92 MMHG | HEIGHT: 63 IN

## 2021-05-19 DIAGNOSIS — M77.12 LATERAL EPICONDYLITIS OF LEFT ELBOW: Primary | ICD-10-CM

## 2021-05-19 DIAGNOSIS — M17.0 PRIMARY OSTEOARTHRITIS OF BOTH KNEES: ICD-10-CM

## 2021-05-19 DIAGNOSIS — Z85.3 PERSONAL HISTORY OF MALIGNANT NEOPLASM OF BREAST: ICD-10-CM

## 2021-05-19 PROCEDURE — 3075F SYST BP GE 130 - 139MM HG: CPT | Performed by: PHYSICAL MEDICINE & REHABILITATION

## 2021-05-19 PROCEDURE — 3008F BODY MASS INDEX DOCD: CPT | Performed by: PHYSICAL MEDICINE & REHABILITATION

## 2021-05-19 PROCEDURE — 3080F DIAST BP >= 90 MM HG: CPT | Performed by: PHYSICAL MEDICINE & REHABILITATION

## 2021-05-19 PROCEDURE — 99214 OFFICE O/P EST MOD 30 MIN: CPT | Performed by: PHYSICAL MEDICINE & REHABILITATION

## 2021-05-19 NOTE — PATIENT INSTRUCTIONS
-Get counterforce elbow strap for lateral epicondylitis  -Ice  -Voltaren gel 3-4 x daily  -Occupational therapy and home exercises  -Follow up in 4 weeks  -If no better, will discuss ultrasound guided injection

## 2021-05-24 NOTE — PROGRESS NOTES
[facility]  FOLLOW UP EVALUATION    Chief Complaint: bilateral knee pain.     HISTORY OF PRESENT ILLNESS:   Patient presents with:  Arm Pain: LOV: 10/7/19 Patient comes in for new CC, c/o of localized L elbow see how her knees will function during those meetings. She denies any swelling or mechanical symptoms otherwise. She does endorse some crepitus in both knees as well as some pain along the medial aspect of the knees.   Overall she feels 50 to 60% better s 11/11/2011   • Visual impairment     wears glasses         PAST SURGICAL HISTORY:     Past Surgical History:   Procedure Laterality Date   • BIOPSY OF BREAST, NEEDLE CORE Right 9/11/2007 9/11/07 (core biopsy) Poorly differentiated infiltrating ductal ca john  Other                   RASH, FACE FLUSHING    Comment:Dial soap             Scented detergents  Adhesive Tape           RASH  Cephalosporins          ITCHING  Hydrocodone             ITCHING  Duricef [Cefadroxil]    ITCHING  Methocarbamol Genitourinary  Difficulty Urinating: denies  Bladder Incontinence: denies  Pelvic Pain: denies  Painful Urination: denies   Musculoskeletal  Joint Stiffness: admits  Painful Joints: admits  Tailbone Pain: denies  Swollen Joints: denies   Peripheral Vascu patellar facets and medial joint line in both knees  ROM: Full active ROM in flexion and extension  Strength: 5/5  Sensation: Intact to light touch in all dermatomes of the lower extremities  Reflexes: 2/4 at L4 and S1  Ildefonso Test: negative for pain over p with patient. ASSESSMENT:     1. Lateral epicondylitis of left elbow    2. Primary osteoarthritis of both knees    3. Personal history of malignant neoplasm of breast        Ms. Regina Shipley is a pleasant 27-year-old female who presents today for new evaluat

## 2021-06-15 ENCOUNTER — TELEPHONE (OUTPATIENT)
Dept: ENDOCRINOLOGY CLINIC | Facility: CLINIC | Age: 66
End: 2021-06-15

## 2021-06-15 DIAGNOSIS — M81.0 AGE-RELATED OSTEOPOROSIS WITHOUT CURRENT PATHOLOGICAL FRACTURE: Primary | ICD-10-CM

## 2021-06-15 NOTE — TELEPHONE ENCOUNTER
Patient returned call. Spoke with Dr. Brenda Pitt and states visit would have to be with her and gave verbal ok to add patient on 6/29/21 at 0900.      Future Appointments   Date Time Provider Lazarus Vega   6/29/2021  9:00 AM Aung Kirk MD [facility]

## 2021-06-15 NOTE — TELEPHONE ENCOUNTER
Patient will be due for next Prolia injection after 06/29/21. Patient has Medicare part B insurance which covers 80% of cost if injection is administered in physicians office and if injection is handled as buy and bill. Patient also has [employer].  Call

## 2021-06-16 ENCOUNTER — OFFICE VISIT (OUTPATIENT)
Dept: NEUROLOGY | Facility: CLINIC | Age: 66
End: 2021-06-16
Payer: COMMERCIAL

## 2021-06-16 VITALS
HEART RATE: 71 BPM | OXYGEN SATURATION: 97 % | BODY MASS INDEX: 29.02 KG/M2 | SYSTOLIC BLOOD PRESSURE: 140 MMHG | HEIGHT: 64 IN | WEIGHT: 170 LBS | DIASTOLIC BLOOD PRESSURE: 96 MMHG

## 2021-06-16 DIAGNOSIS — M17.0 PRIMARY OSTEOARTHRITIS OF BOTH KNEES: ICD-10-CM

## 2021-06-16 DIAGNOSIS — M77.12 LATERAL EPICONDYLITIS OF LEFT ELBOW: Primary | ICD-10-CM

## 2021-06-16 DIAGNOSIS — M62.9 HAMSTRING TIGHTNESS OF BOTH LOWER EXTREMITIES: ICD-10-CM

## 2021-06-16 DIAGNOSIS — Z85.3 PERSONAL HISTORY OF MALIGNANT NEOPLASM OF BREAST: ICD-10-CM

## 2021-06-16 PROCEDURE — 3080F DIAST BP >= 90 MM HG: CPT | Performed by: PHYSICAL MEDICINE & REHABILITATION

## 2021-06-16 PROCEDURE — 3008F BODY MASS INDEX DOCD: CPT | Performed by: PHYSICAL MEDICINE & REHABILITATION

## 2021-06-16 PROCEDURE — 3077F SYST BP >= 140 MM HG: CPT | Performed by: PHYSICAL MEDICINE & REHABILITATION

## 2021-06-16 PROCEDURE — 99214 OFFICE O/P EST MOD 30 MIN: CPT | Performed by: PHYSICAL MEDICINE & REHABILITATION

## 2021-06-16 NOTE — PROGRESS NOTES
[address]  FOLLOW UP EVALUATION    Chief Complaint: bilateral knee pain.     HISTORY OF PRESENT ILLNESS:   Patient presents with:  Elbow Pain: LOV: 5/19/21 Patient f/u on L localized elbow pain and L dorsal to one leg at a time. She still endorses some aching pain in bilateral knees that is worse with increased standing and activity. She is not taking any medication at this time for this pain. She is not icing or doing any other treatments.   She continues 11/2020    Right chin   • Breast cancer ([facility] [address] 9/11/2007 9/19/07(right breast lumpectomy with sentinel LN biopsy) 2.0 cm infiltrating poorly differentiated ductal    • Cancer [facility]) 2007    breast cancer and basal cell carcinoma   • Cataract    • Chickenp Albuterol Sulfate HFA (PROAIR HFA) 108 (90 Base) MCG/ACT Inhalation Aero Soln Inhale 2 puffs into the lungs every 4 (four) hours as needed for Wheezing.  (Patient not taking: Reported on 4/15/2021 ) 1 Inhaler 2   • Olopatadine HCl (PATADAY) 0.2 % Ophthalmic denies   Cardiovascular  Chest Pain: denies  Irregular Heartbeat: denies   Gastrointestinal  Bowel Incontinence: denies  Heartburn: denies   Genitourinary  Difficulty Urinating: denies  Bladder Incontinence: denies   Musculoskeletal  Joint Stiffness: admit dermatomes of the lower extremities  Reflexes: 2/4 at L4 and S1  Ildefonso Test: negative for pain over patella  Bounce Home test: negative for \"catch\" or pain  Lachmans: negative for instability  Anterior / Posterior drawer: negative for instability  Valgus neoplasm of breast    4. Hamstring tightness of both lower extremities        Ms. Tory Iniguez is a pleasant 56-year-old female is following up for left lateral elbow pain left knee pain secondary to osteoarthritis. .  She has responded well to occupational ther

## 2021-06-18 ENCOUNTER — HOSPITAL ENCOUNTER (OUTPATIENT)
Dept: MAMMOGRAPHY | Facility: HOSPITAL | Age: 66
Discharge: HOME | End: 2021-06-18
Attending: INTERNAL MEDICINE
Payer: MEDICARE

## 2021-06-18 DIAGNOSIS — R92.2 DENSE BREASTS: ICD-10-CM

## 2021-06-18 DIAGNOSIS — Z12.31 SCREENING MAMMOGRAM, ENCOUNTER FOR: ICD-10-CM

## 2021-06-18 PROCEDURE — 77063 BREAST TOMOSYNTHESIS BI: CPT | Performed by: INTERNAL MEDICINE

## 2021-06-18 PROCEDURE — 77067 SCR MAMMO BI INCL CAD: CPT | Performed by: INTERNAL MEDICINE

## 2021-06-26 ENCOUNTER — LAB ENCOUNTER (OUTPATIENT)
Dept: LAB | Age: 66
End: 2021-06-26
Attending: INTERNAL MEDICINE
Payer: MEDICARE

## 2021-06-26 DIAGNOSIS — M81.0 AGE-RELATED OSTEOPOROSIS WITHOUT CURRENT PATHOLOGICAL FRACTURE: ICD-10-CM

## 2021-06-26 DIAGNOSIS — Z00.00 MEDICARE ANNUAL WELLNESS VISIT, SUBSEQUENT: ICD-10-CM

## 2021-06-26 DIAGNOSIS — E78.00 PURE HYPERCHOLESTEROLEMIA: ICD-10-CM

## 2021-06-26 PROCEDURE — 84443 ASSAY THYROID STIM HORMONE: CPT

## 2021-06-26 PROCEDURE — 85025 COMPLETE CBC W/AUTO DIFF WBC: CPT

## 2021-06-26 PROCEDURE — 81003 URINALYSIS AUTO W/O SCOPE: CPT | Performed by: INTERNAL MEDICINE

## 2021-06-26 PROCEDURE — 80061 LIPID PANEL: CPT

## 2021-06-26 PROCEDURE — 36415 COLL VENOUS BLD VENIPUNCTURE: CPT

## 2021-06-26 PROCEDURE — 80053 COMPREHEN METABOLIC PANEL: CPT

## 2021-06-26 PROCEDURE — 84080 ASSAY ALKALINE PHOSPHATASES: CPT

## 2021-06-26 PROCEDURE — 83970 ASSAY OF PARATHORMONE: CPT

## 2021-06-26 PROCEDURE — 82306 VITAMIN D 25 HYDROXY: CPT

## 2021-06-29 ENCOUNTER — OFFICE VISIT (OUTPATIENT)
Dept: ENDOCRINOLOGY CLINIC | Facility: CLINIC | Age: 66
End: 2021-06-29
Payer: COMMERCIAL

## 2021-06-29 VITALS
SYSTOLIC BLOOD PRESSURE: 125 MMHG | WEIGHT: 171 LBS | DIASTOLIC BLOOD PRESSURE: 75 MMHG | HEART RATE: 83 BPM | BODY MASS INDEX: 29 KG/M2

## 2021-06-29 DIAGNOSIS — M81.0 AGE-RELATED OSTEOPOROSIS WITHOUT CURRENT PATHOLOGICAL FRACTURE: Primary | ICD-10-CM

## 2021-06-29 DIAGNOSIS — E55.9 VITAMIN D DEFICIENCY: ICD-10-CM

## 2021-06-29 PROCEDURE — 99213 OFFICE O/P EST LOW 20 MIN: CPT | Performed by: INTERNAL MEDICINE

## 2021-06-29 PROCEDURE — 3078F DIAST BP <80 MM HG: CPT | Performed by: INTERNAL MEDICINE

## 2021-06-29 PROCEDURE — 3074F SYST BP LT 130 MM HG: CPT | Performed by: INTERNAL MEDICINE

## 2021-06-29 NOTE — PROGRESS NOTES
Return Office Visit     CHIEF COMPLAINT:  Patient presents with:  Osteoporosis: Pt is present to follow up with the doctor        HISTORY OF PRESENT ILLNESS:  Tam Galloway is a 78year old female who presents for follow up for Osteoporosis.     She was daily.       • Olopatadine HCl (PATADAY) 0.2 % Ophthalmic Solution 1 drop by Each eye route daily. • Calcium Citrate (CITRACAL OR) Take 650 mg by mouth daily. • Glucosa-Chondr-Na [facility] [phone] MG Oral Tab Take by mouth daily. normal conjunctivae, sclera. , normal sclera and normal pupils  Throat/Neck: normal sound to voice. Normal hearing, normal speech  Respiratory:  Speaking in full sentences, non-labored.  no increased work of breathing, no audible wheezing    Skin:  normal mo

## 2021-06-30 ENCOUNTER — TELEPHONE (OUTPATIENT)
Dept: ENDOCRINOLOGY CLINIC | Facility: CLINIC | Age: 66
End: 2021-06-30

## 2021-06-30 NOTE — TELEPHONE ENCOUNTER
Calcium, kidney function , vitamin D okay  Bone marker has gone up slightly ( 7 --> 8.2) usually on prolia we like it to go down  We will keep a close eye on it  We will repeat it in 6 months  She is also due for a DXA in a few weeks/ months we will see wh

## 2021-06-30 NOTE — TELEPHONE ENCOUNTER
rn called patient with message by dr Martino Head, patient verbalized understanding and will schedule dexa scan.

## 2021-07-07 ENCOUNTER — MED REC SCAN ONLY (OUTPATIENT)
Dept: NEUROLOGY | Facility: CLINIC | Age: 66
End: 2021-07-07

## 2021-07-12 ENCOUNTER — OFFICE VISIT (OUTPATIENT)
Dept: HEMATOLOGY/ONCOLOGY | Facility: HOSPITAL | Age: 66
End: 2021-07-12
Attending: INTERNAL MEDICINE
Payer: MEDICARE

## 2021-07-12 VITALS
TEMPERATURE: 98 F | OXYGEN SATURATION: 97 % | BODY MASS INDEX: 30.83 KG/M2 | SYSTOLIC BLOOD PRESSURE: 148 MMHG | DIASTOLIC BLOOD PRESSURE: 79 MMHG | HEART RATE: 68 BPM | HEIGHT: 63 IN | WEIGHT: 174 LBS

## 2021-07-12 DIAGNOSIS — E89.89 LYMPHEDEMA OF UPPER EXTREMITY FOLLOWING LYMPHADENECTOMY: ICD-10-CM

## 2021-07-12 DIAGNOSIS — Z85.3 ENCOUNTER FOR FOLLOW-UP SURVEILLANCE OF BREAST CANCER: Primary | ICD-10-CM

## 2021-07-12 DIAGNOSIS — Z12.31 SCREENING MAMMOGRAM, ENCOUNTER FOR: ICD-10-CM

## 2021-07-12 DIAGNOSIS — I89.0 LYMPHEDEMA OF UPPER EXTREMITY FOLLOWING LYMPHADENECTOMY: ICD-10-CM

## 2021-07-12 DIAGNOSIS — Z08 ENCOUNTER FOR FOLLOW-UP SURVEILLANCE OF BREAST CANCER: Primary | ICD-10-CM

## 2021-07-12 DIAGNOSIS — Z85.3 HISTORY OF BREAST CANCER: ICD-10-CM

## 2021-07-12 PROCEDURE — 99214 OFFICE O/P EST MOD 30 MIN: CPT | Performed by: INTERNAL MEDICINE

## 2021-07-12 RX ORDER — CHOLECALCIFEROL (VITAMIN D3) 125 MCG
CAPSULE ORAL NIGHTLY
COMMUNITY

## 2021-07-12 NOTE — PROGRESS NOTES
HPI   Bruce Stewart is a 78year old female here for f/u of Encounter for follow-up surveillance of breast cancer  (primary encounter diagnosis)  History of breast cancer  Screening mammogram, encounter for     States she feels well other than seasonal Oral Cap Take 2,000 Units by mouth daily. • Olopatadine HCl (PATADAY) 0.2 % Ophthalmic Solution 1 drop by Each eye route daily.          Allergies:     Perfumes                ASTHMA    Comment:Floral scents and pine,and stargazer lillies  Other Smoker        Packs/day: 1.00        Years: 10.00        Pack years: 10        Types: Cigarettes        Quit date: 1981        Years since quittin.5      Smokeless tobacco: Never Used    Vaping Use      Vaping Use: Never used    Alcohol use:  Yes regions. Psych/Depression: nl          ASSESSMENT/PLAN:   Encounter for follow-up surveillance of breast cancer  (primary encounter diagnosis)  History of breast cancer  Screening mammogram, encounter for     Tomosynthesis on June 21, 2021 was BIRADS-2. For patients over the age of 36, the target due date for the patient's next mammogram has been entered into a reminder system.        Patient received a discharge summary from the technologist after completion of exam.      Breast marker legend used on im

## 2021-07-14 ENCOUNTER — TELEPHONE (OUTPATIENT)
Dept: NEUROLOGY | Facility: CLINIC | Age: 66
End: 2021-07-14

## 2021-07-14 ENCOUNTER — OFFICE VISIT (OUTPATIENT)
Dept: NEUROLOGY | Facility: CLINIC | Age: 66
End: 2021-07-14
Payer: COMMERCIAL

## 2021-07-14 VITALS — OXYGEN SATURATION: 98 % | WEIGHT: 170 LBS | HEART RATE: 66 BPM | BODY MASS INDEX: 30.12 KG/M2 | HEIGHT: 63 IN

## 2021-07-14 DIAGNOSIS — G47.9 SLEEP DISTURBANCE: ICD-10-CM

## 2021-07-14 DIAGNOSIS — M17.0 PRIMARY OSTEOARTHRITIS OF BOTH KNEES: ICD-10-CM

## 2021-07-14 DIAGNOSIS — Z85.3 PERSONAL HISTORY OF MALIGNANT NEOPLASM OF BREAST: ICD-10-CM

## 2021-07-14 DIAGNOSIS — M62.9 HAMSTRING TIGHTNESS OF BOTH LOWER EXTREMITIES: ICD-10-CM

## 2021-07-14 DIAGNOSIS — M77.12 LATERAL EPICONDYLITIS OF LEFT ELBOW: Primary | ICD-10-CM

## 2021-07-14 PROCEDURE — 3008F BODY MASS INDEX DOCD: CPT | Performed by: PHYSICAL MEDICINE & REHABILITATION

## 2021-07-14 PROCEDURE — 99214 OFFICE O/P EST MOD 30 MIN: CPT | Performed by: PHYSICAL MEDICINE & REHABILITATION

## 2021-07-14 NOTE — PROGRESS NOTES
[address]  FOLLOW UP EVALUATION    Chief Complaint: bilateral knee pain.     HISTORY OF PRESENT ILLNESS:   Patient presents with:  Elbow Pain: LOV: 6/16/21 Patient f/u on localized L elbow pain and dorsal L more severe with increased activity rated up to 7 out of 10. She has had x-ray imaging of the wrist and hand as noted below. She has not had any dedicated treatment for this problem. 10/7/19  Patient is here for follow-up of bilateral knee pain.   She Factors: Meds  Prior Treatments: Meds: Bengay, Salon Pas, knee sleeve    Work Related: No  Occupation: full time job doing operations administrative work on a desk  Activity level: Likes to walk daily      PAST MEDICAL HISTORY:     Past Medical History: Inhale 2 puffs into the lungs every 4 (four) hours as needed for Wheezing. 1 Inhaler 2   • PEG 3350 17 g Oral Powd Pack Take by mouth.  Take every 2-3 days      • Pantoprazole Sodium 40 MG Oral Tab EC TAKE 1 TABLET BY MOUTH IN  THE MORNING BEFORE  BREAKFAST Cigarettes        Quit date: 1981        Years since quittin.5      Smokeless tobacco: Never Used    Vaping Use      Vaping Use: Never used    Alcohol use:  Yes      Alcohol/week: 0.0 standard drinks      Comment: social    Drug use: No      Commen epicondyle: Negative  Cozens test for lateral epicondylitis: Negative      LABS:   No results found for: EAG, A1C  Lab Results   Component Value Date    WBC 4.5 06/26/2021    RBC 4.14 06/26/2021    HGB 13.0 06/26/2021    HCT 39.6 06/26/2021    MCV 95.7 06/ which was ordered for today. I recommend that she use the counterforce strap and ice and Voltaren gel as needed. She will follow-up in 2 months and if no better we will pursue ultrasound-guided corticosteroid injection.   This was discussed with her today

## 2021-09-28 ENCOUNTER — PATIENT MESSAGE (OUTPATIENT)
Dept: INTERNAL MEDICINE CLINIC | Facility: CLINIC | Age: 66
End: 2021-09-28

## 2021-09-28 NOTE — TELEPHONE ENCOUNTER
From: Kwame Montana  To: Darryl Tracy MD  Sent: 9/28/2021 9:39 AM CDT  Subject: Covid 19 Vaccine Booster - done    Hello,    I was given the Covid 19 vaccine booster yesterday 9/27/2021 at my local [place]. Would you please add this to my record?

## 2021-09-29 ENCOUNTER — OFFICE VISIT (OUTPATIENT)
Dept: PHYSICAL MEDICINE AND REHAB | Facility: CLINIC | Age: 66
End: 2021-09-29
Payer: COMMERCIAL

## 2021-09-29 VITALS
OXYGEN SATURATION: 98 % | HEIGHT: 64 IN | WEIGHT: 170 LBS | SYSTOLIC BLOOD PRESSURE: 146 MMHG | DIASTOLIC BLOOD PRESSURE: 85 MMHG | BODY MASS INDEX: 29.02 KG/M2 | HEART RATE: 88 BPM

## 2021-09-29 DIAGNOSIS — M77.12 LATERAL EPICONDYLITIS OF LEFT ELBOW: Primary | ICD-10-CM

## 2021-09-29 PROCEDURE — 99213 OFFICE O/P EST LOW 20 MIN: CPT | Performed by: PHYSICAL MEDICINE & REHABILITATION

## 2021-09-29 PROCEDURE — 3079F DIAST BP 80-89 MM HG: CPT | Performed by: PHYSICAL MEDICINE & REHABILITATION

## 2021-09-29 PROCEDURE — 3077F SYST BP >= 140 MM HG: CPT | Performed by: PHYSICAL MEDICINE & REHABILITATION

## 2021-09-29 PROCEDURE — 3008F BODY MASS INDEX DOCD: CPT | Performed by: PHYSICAL MEDICINE & REHABILITATION

## 2021-09-29 NOTE — PROGRESS NOTES
[address] [facility]  FOLLOW UP EVALUATION    Chief Complaint: bilateral knee pain.     HISTORY OF PRESENT ILLNESS:   Patient presents with:  Elbow Pain: LOV 7/14/21 Patient comes in for localized L elbow pain, denies pain.  Today she is here for evaluation of left elbow pain ongoing for the last several months with recent exacerbation. Pain is located along the lateral aspect with some radiation down the forearm.   Pain is worsened with increased usage of the hand and left worse than right  Duration of pain/symptoms: 3years and more frequent now.  She recently moved from 1 level house to 3 level house and doing the stairs is making the pain worse  Frequency: Intermittent  Intensity: moderate  Pain: Today 7/10 Worst 7/10 multiple sentinel lymph node excisions   • RADIATION RIGHT Right 2008    right breast         CURRENT MEDICATIONS:     Current Outpatient Medications   Medication Sig Dispense Refill   • Melatonin 5 MG Oral Tab Take by mouth nightly.      • Diclofenac [place] Mother    • Breast Cancer Mother 76   • Cancer Mother    • Other (Other) Mother    • Other (copd) Mother         cause of death- passed at age 80   • Alcohol and Other Disorders Associated Other         alcoholism grandmother   • Skin cancer Sister without any difficulty  Posture: No scoliosis or kyphosis    Musculoskeletal/Neurological Exam:    ELBOW:  Inspection: no erythema, swelling, or obvious deformity. No muscle atrophy or asymmetrical hypertrophy. Distal biceps tendon intact.  No bursitis vis reviewed and discussed with patient. ASSESSMENT:     1. Lateral epicondylitis of left elbow        Ms. Matias Mcduffie is a pleasant 26-year-old female following up for left lateral elbow pain. She is doing well with home exercises.   Advised her to continue he

## 2021-09-30 ENCOUNTER — HOSPITAL ENCOUNTER (OUTPATIENT)
Dept: BONE DENSITY | Age: 66
Discharge: HOME | End: 2021-09-30
Attending: INTERNAL MEDICINE
Payer: MEDICARE

## 2021-09-30 ENCOUNTER — TELEPHONE (OUTPATIENT)
Dept: ENDOCRINOLOGY CLINIC | Facility: CLINIC | Age: 66
End: 2021-09-30

## 2021-09-30 DIAGNOSIS — E55.9 VITAMIN D DEFICIENCY: ICD-10-CM

## 2021-09-30 DIAGNOSIS — M81.0 AGE-RELATED OSTEOPOROSIS WITHOUT CURRENT PATHOLOGICAL FRACTURE: ICD-10-CM

## 2021-09-30 DIAGNOSIS — M81.0 AGE-RELATED OSTEOPOROSIS WITHOUT CURRENT PATHOLOGICAL FRACTURE: Primary | ICD-10-CM

## 2021-09-30 PROCEDURE — 77080 DXA BONE DENSITY AXIAL: CPT | Performed by: INTERNAL MEDICINE

## 2021-09-30 NOTE — TELEPHONE ENCOUNTER
dxa shows improvement in BMD on hip but loss in BMD on spine  Given some loss in spine but improvement in hip, we could c/w prolia or consider another medication evenity ( mix of prolia and a bone uilding therapy)  She will be due for prolis around dec  Pl

## 2021-10-01 NOTE — TELEPHONE ENCOUNTER
Spoke to patient regarding Dr. Randa Zapata note below. Patient verbalized understanding, and booked follow up appointment on 12/13. Labs have been ordered and patient has been notified.

## 2021-10-13 ENCOUNTER — OFFICE VISIT (OUTPATIENT)
Dept: OBGYN CLINIC | Facility: CLINIC | Age: 66
End: 2021-10-13
Payer: COMMERCIAL

## 2021-10-13 VITALS
SYSTOLIC BLOOD PRESSURE: 137 MMHG | WEIGHT: 172 LBS | DIASTOLIC BLOOD PRESSURE: 82 MMHG | HEART RATE: 67 BPM | BODY MASS INDEX: 30 KG/M2

## 2021-10-13 DIAGNOSIS — Z01.419 ENCOUNTER FOR GYNECOLOGICAL EXAMINATION: Primary | ICD-10-CM

## 2021-10-13 PROCEDURE — 3079F DIAST BP 80-89 MM HG: CPT | Performed by: OBSTETRICS & GYNECOLOGY

## 2021-10-13 PROCEDURE — 3075F SYST BP GE 130 - 139MM HG: CPT | Performed by: OBSTETRICS & GYNECOLOGY

## 2021-10-13 PROCEDURE — 99213 OFFICE O/P EST LOW 20 MIN: CPT | Performed by: OBSTETRICS & GYNECOLOGY

## 2021-10-14 NOTE — PROGRESS NOTES
Ba Woodson is a 78year old female  No LMP recorded. Patient is postmenopausal. here for annual exam.       Last seen 2019. Last pap 2019 normal with neg HPV. Last mammogram 2021. History of breast cancer in .   Sees Dr Chacon Click node excisions   • RADIATION RIGHT Right 2008    right breast       SOCIAL HISTORY:  Social History    Tobacco Use      Smoking status: Former Smoker        Packs/day: 1.00        Years: 10.00        Pack years: 10        Types: Cigarettes        Quit date times daily. For canker sores  In  Mouth 5 g 1   • Vitamin D3 (VITAMIN D3) 2000 UNITS Oral Cap Take 2,000 Units by mouth daily. • Olopatadine HCl 0.2 % Ophthalmic Solution 1 drop by Each eye route daily.            ALLERGIES:    Perfumes time, place, person and situation.  Appropriate mood and affect    Pelvic Exam:  External Genitalia: normal appearance, hair distribution, and no lesions  Urethral Meatus:  normal in size, location, without lesions and prolapse  Bladder:  No fullness, rosa

## 2021-10-16 RX ORDER — PANTOPRAZOLE SODIUM 40 MG/1
TABLET, DELAYED RELEASE ORAL
Qty: 90 TABLET | Refills: 1 | Status: SHIPPED | OUTPATIENT
Start: 2021-10-16

## 2021-10-16 NOTE — TELEPHONE ENCOUNTER
Refill passed per [facility] protocol   Requested Prescriptions   Pending Prescriptions Disp Refills    PANTOPRAZOLE 40 MG Oral Tab EC [Pharmacy Med Name: Pantoprazole Sodium 40 MG Oral Tablet Delayed Release] 90 tablet 3     Sig: TAKE 1 TABLET BY DUC

## 2021-10-25 ENCOUNTER — OFFICE VISIT (OUTPATIENT)
Dept: DERMATOLOGY CLINIC | Facility: CLINIC | Age: 66
End: 2021-10-25
Payer: COMMERCIAL

## 2021-10-25 DIAGNOSIS — L72.0 EPIDERMAL CYST: ICD-10-CM

## 2021-10-25 DIAGNOSIS — Z85.828 PERSONAL HISTORY OF SKIN CANCER: Primary | ICD-10-CM

## 2021-10-25 DIAGNOSIS — Z87.2 HISTORY OF ACTINIC KERATOSES: ICD-10-CM

## 2021-10-25 DIAGNOSIS — L81.4 SOLAR LENTIGO: ICD-10-CM

## 2021-10-25 DIAGNOSIS — D22.9 MULTIPLE MELANOCYTIC NEVI: ICD-10-CM

## 2021-10-25 DIAGNOSIS — L82.1 SEBORRHEIC KERATOSES: ICD-10-CM

## 2021-10-25 PROCEDURE — 99213 OFFICE O/P EST LOW 20 MIN: CPT | Performed by: DERMATOLOGY

## 2021-10-25 RX ORDER — CALCIUM CARBONATE/VITAMIN D3 600 MG-10
TABLET ORAL
COMMUNITY
Start: 2021-06-20

## 2021-10-25 NOTE — PROGRESS NOTES
HPI:     Chief Complaint     Upper Body Exam        HPI     Upper Body Exam      Additional comments: established pt, presents for 6 months upper body exam, personal hx of BCC.  denies any skin changes, denies concerns          Last edited by Gricelda Martinez 1 Tube onto teeth 2 (two) times daily. For canker sores  In  Mouth 5 g 1   • Vitamin D3 (VITAMIN D3) 2000 UNITS Oral Cap Take 2,000 Units by mouth daily. • Olopatadine HCl 0.2 % Ophthalmic Solution 1 drop by Each eye route daily.          Allergies: excisions   • RADIATION RIGHT Right 2008    right breast     Social History    Socioeconomic History      Marital status:       Spouse name: Not on file      Number of children: Not on file      Years of education: Not on file      Highest education Determinants of Health  Financial Resource Strain:       Difficulty of Paying Living Expenses: Not on file  Food Insecurity:       Worried About Running Out of Food in the Last Year: Not on file      Ran Out of Food in the Last Year: Not on file  Transport including face, neck, chest, back, abdomen, r upper extremity, l upper extremity, and scalp    The patient is alert, oriented and appears their stated age. The patient is in no distress and is well nourished.     Exam is remarkable for the following:  -The

## 2021-11-23 ENCOUNTER — MED REC SCAN ONLY (OUTPATIENT)
Dept: INTERNAL MEDICINE CLINIC | Facility: CLINIC | Age: 66
End: 2021-11-23

## 2021-12-10 ENCOUNTER — LAB ENCOUNTER (OUTPATIENT)
Dept: LAB | Facility: HOSPITAL | Age: 66
End: 2021-12-10
Attending: INTERNAL MEDICINE
Payer: MEDICARE

## 2021-12-10 DIAGNOSIS — M81.0 AGE-RELATED OSTEOPOROSIS WITHOUT CURRENT PATHOLOGICAL FRACTURE: ICD-10-CM

## 2021-12-10 PROCEDURE — 80053 COMPREHEN METABOLIC PANEL: CPT

## 2021-12-10 PROCEDURE — 36415 COLL VENOUS BLD VENIPUNCTURE: CPT

## 2021-12-10 PROCEDURE — 82306 VITAMIN D 25 HYDROXY: CPT

## 2021-12-10 PROCEDURE — 83970 ASSAY OF PARATHORMONE: CPT

## 2021-12-10 PROCEDURE — 84080 ASSAY ALKALINE PHOSPHATASES: CPT

## 2021-12-13 ENCOUNTER — OFFICE VISIT (OUTPATIENT)
Dept: ENDOCRINOLOGY CLINIC | Facility: CLINIC | Age: 66
End: 2021-12-13
Payer: COMMERCIAL

## 2021-12-13 VITALS
HEIGHT: 63 IN | DIASTOLIC BLOOD PRESSURE: 77 MMHG | SYSTOLIC BLOOD PRESSURE: 139 MMHG | HEART RATE: 74 BPM | BODY MASS INDEX: 30.12 KG/M2 | WEIGHT: 170 LBS

## 2021-12-13 DIAGNOSIS — M81.0 AGE-RELATED OSTEOPOROSIS WITHOUT CURRENT PATHOLOGICAL FRACTURE: ICD-10-CM

## 2021-12-13 DIAGNOSIS — E55.9 VITAMIN D DEFICIENCY: Primary | ICD-10-CM

## 2021-12-13 PROCEDURE — 3008F BODY MASS INDEX DOCD: CPT | Performed by: INTERNAL MEDICINE

## 2021-12-13 PROCEDURE — 96372 THER/PROPH/DIAG INJ SC/IM: CPT | Performed by: INTERNAL MEDICINE

## 2021-12-13 PROCEDURE — 3075F SYST BP GE 130 - 139MM HG: CPT | Performed by: INTERNAL MEDICINE

## 2021-12-13 PROCEDURE — 3078F DIAST BP <80 MM HG: CPT | Performed by: INTERNAL MEDICINE

## 2021-12-13 PROCEDURE — 99213 OFFICE O/P EST LOW 20 MIN: CPT | Performed by: INTERNAL MEDICINE

## 2021-12-13 NOTE — PROGRESS NOTES
Return Office Visit     CHIEF COMPLAINT:  Patient presents with:   Follow - Up: osteporosis, patient had a dexa scan on 9/30/21       HISTORY OF PRESENT ILLNESS:  Tari Desai is a 78year old female who presents for follow up for Osteoporosis.     She 2000 UNITS Oral Cap Take 2,000 Units by mouth daily. • Olopatadine HCl 0.2 % Ophthalmic Solution 1 drop by Each eye route daily.              ALLERGY:    Perfumes                ASTHMA    Comment:Floral scents and pine,and stargazer lillies  Other voice. Normal hearing, normal speech  Respiratory:  Speaking in full sentences, non-labored.  no increased work of breathing, no audible wheezing    Skin:  normal moisture and skin texture, no visible lesions  Hematologic:  no excessive bruising  Neuro: mot

## 2022-01-18 PROBLEM — Z85.3 HISTORY OF BREAST CANCER: Status: RESOLVED | Noted: 2020-06-19 | Resolved: 2022-01-18

## 2022-01-19 NOTE — H&P
IP  GROUP DOCUMENTATION INDIVIDUAL                                                                          Group Therapy Note    Date: 1/18/2022    Group Start Time: 1900  Group End Time: 1930  Group Topic: Medication    [facility] 1 ADULT CHEM DEP    Nicole Rucker RN    IP [facility] GROUP DOCUMENTATION GROUP    Group Therapy Note    Attendees: 6         Attendance: Attended    Patient's Goal:  Understanding the importance of medication compliance. Interventions/techniques: Informed    Follows Directions: Followed directions    Interactions: Interacted appropriately    Mental Status: Calm    Behavior/appearance: Cooperative and Disheveled    Goals Achieved:  Identified feelings      Lorena Lawrence RN Pre Procedure History & Physical Examination    Patient Name: Keturah Gil  MRN: [id number]  CSN: [id number]  YOB: 1955    Diagnosis: screening colonoscopy      Denosumab (PROLIA) 60 MG/ML injection 60 mg, 60 mg, Subcutaneous, Q6 Months, ITCHING  Duricef [Cefadroxil]      Hydrocodone             ITCHING  Sulfanilamide           RASH  Methocarbamol           RASH    Comment:Other name robaxin    Past Medical History:   Diagnosis Date   • Allergic rhinitis    • Asthma    • Basal cell carcino drinks      Frequency: Monthly or less      Comment: social        ROS:   CONSTITUTIONAL:  negative for fevers, rigors  EYES:  negative for diplopia   RESPIRATORY:  negative for severe shortness of breath  CARDIOVASCULAR:  negative for crushing sub-sternal

## 2022-01-20 ENCOUNTER — PATIENT MESSAGE (OUTPATIENT)
Dept: INTERNAL MEDICINE CLINIC | Facility: CLINIC | Age: 67
End: 2022-01-20

## 2022-01-20 RX ORDER — MONTELUKAST SODIUM 10 MG/1
10 TABLET ORAL NIGHTLY
Qty: 90 TABLET | Refills: 0 | Status: SHIPPED | OUTPATIENT
Start: 2022-01-20 | End: 2022-03-23

## 2022-01-20 NOTE — TELEPHONE ENCOUNTER
Please review; protocol failed. Or has no protocol. See my chart message. Pt was advised she needs an office visit. Requested Prescriptions   Pending Prescriptions Disp Refills    MONTELUKAST 10 MG Oral Tab [Pharmacy Med Name: Montelukast Sodium 10 MG Oral Tablet] 90 tablet 3     Sig: TAKE 1 TABLET BY MOUTH AT  NIGHT        Asthma & COPD Medication Protocol Failed - 1/20/2022 10:33 AM        Failed - Appointment in past 6 or next 3 months               Recent Outpatient Visits              1 month ago Vitamin D deficiency    [facility] Endocrinology Amarjit Vazquez MD    Office Visit    2 months ago Personal history of skin cancer    [facility] Dermatology Donnie Nissen, MD    Office Visit    3 months ago Encounter for gynecological examination    [facility], [address], Serenity Gonsales MD    Office Visit    3 months ago Lateral epic[place]yl[place] of left [place]    6 months ago Lateral epicondylitis of left elbow    [facility]    Office Visit           Future Appointments         Provider Department Appt Notes    In 3 months Kathie Blackburn MD [facility] Dermatology skin check    In 4 months Amarjit Vazquez, [address] Endocrinology 6 months and Prolia     In 5 months Juliocesar Irwin 19 Hematology Oncology FOLLOW UP VISIT. CL  1Y

## 2022-01-20 NOTE — TELEPHONE ENCOUNTER
See refill encounter 1/20/22.      From: Cam Hands  To: Mohamud Hatfield MD  Sent: 1/20/2022 10:38 AM CST  Subject: Prescription renewal - [facility]    Hi,  Just advising that [facility] will be reaching out to your office to renew a prescription

## 2022-01-21 NOTE — TELEPHONE ENCOUNTER
[employer] message sent to pt.        Future Appointments   Date Time Provider Lazarus Silvia   4/27/2022 11:15 AM Ladarius Smith MD [facility]   6/14/2022  9:00 AM Alexander Nation MD [address] [place] [address]   7/12/2022 12:00 PM Tory Amin MD E

## 2022-01-27 ENCOUNTER — TELEPHONE (OUTPATIENT)
Dept: CASE MANAGEMENT | Age: 67
End: 2022-01-27

## 2022-03-16 ENCOUNTER — TELEMEDICINE (OUTPATIENT)
Dept: INTERNAL MEDICINE CLINIC | Facility: CLINIC | Age: 67
End: 2022-03-16
Payer: COMMERCIAL

## 2022-03-16 DIAGNOSIS — J01.41 ACUTE RECURRENT PANSINUSITIS: Primary | ICD-10-CM

## 2022-03-16 DIAGNOSIS — Z20.822 CLOSE EXPOSURE TO COVID-19 VIRUS: ICD-10-CM

## 2022-03-16 PROCEDURE — 99214 OFFICE O/P EST MOD 30 MIN: CPT | Performed by: INTERNAL MEDICINE

## 2022-03-16 RX ORDER — FLUTICASONE PROPIONATE 50 MCG
2 SPRAY, SUSPENSION (ML) NASAL DAILY
Qty: 3 EACH | Refills: 3 | Status: SHIPPED | OUTPATIENT
Start: 2022-03-16 | End: 2023-03-11

## 2022-03-16 RX ORDER — DOXYCYCLINE HYCLATE 100 MG
100 TABLET ORAL 2 TIMES DAILY
Qty: 20 TABLET | Refills: 0 | Status: SHIPPED | OUTPATIENT
Start: 2022-03-16 | End: 2022-03-24

## 2022-03-19 ENCOUNTER — NURSE ONLY (OUTPATIENT)
Dept: LAB | Age: 67
End: 2022-03-19
Attending: INTERNAL MEDICINE
Payer: MEDICARE

## 2022-03-19 DIAGNOSIS — Z20.822 CLOSE EXPOSURE TO COVID-19 VIRUS: ICD-10-CM

## 2022-03-20 LAB — SARS-COV-2 RNA RESP QL NAA+PROBE: NOT DETECTED

## 2022-03-23 ENCOUNTER — TELEMEDICINE (OUTPATIENT)
Dept: INTERNAL MEDICINE CLINIC | Facility: CLINIC | Age: 67
End: 2022-03-23

## 2022-03-23 ENCOUNTER — HOSPITAL ENCOUNTER (OUTPATIENT)
Dept: GENERAL RADIOLOGY | Age: 67
Discharge: HOME | End: 2022-03-23
Attending: INTERNAL MEDICINE
Payer: MEDICARE

## 2022-03-23 DIAGNOSIS — R05.8 PRODUCTIVE COUGH: Primary | ICD-10-CM

## 2022-03-23 DIAGNOSIS — J45.20 MILD INTERMITTENT ASTHMA, UNSPECIFIED WHETHER COMPLICATED: ICD-10-CM

## 2022-03-23 DIAGNOSIS — R05.8 PRODUCTIVE COUGH: ICD-10-CM

## 2022-03-23 PROCEDURE — 71046 X-RAY EXAM CHEST 2 VIEWS: CPT | Performed by: INTERNAL MEDICINE

## 2022-03-23 PROCEDURE — 99213 OFFICE O/P EST LOW 20 MIN: CPT | Performed by: INTERNAL MEDICINE

## 2022-03-23 RX ORDER — MONTELUKAST SODIUM 10 MG/1
10 TABLET ORAL NIGHTLY
Qty: 90 TABLET | Refills: 1 | Status: SHIPPED | OUTPATIENT
Start: 2022-03-23 | End: 2022-05-25

## 2022-03-23 RX ORDER — ALBUTEROL SULFATE 90 UG/1
2 AEROSOL, METERED RESPIRATORY (INHALATION) EVERY 4 HOURS PRN
Qty: 3 EACH | Refills: 3 | Status: SHIPPED | OUTPATIENT
Start: 2022-03-23

## 2022-03-23 NOTE — TELEPHONE ENCOUNTER
Refill passed per [facility] protocol. Patient had video visit today. Requested Prescriptions   Pending Prescriptions Disp Refills    MONTELUKAST 10 MG Oral Tab [Pharmacy Med Name: Montelukast Sodium 10 MG Oral Tablet] 90 tablet 3     Sig: TAKE 1 TABLET BY MOUTH AT  NIGHT        Asthma & COPD Medication Protocol Failed - 3/23/2022  3:54 AM        Failed - Appointment in past 6 or next 3 months             Recent Outpatient Visits              Today Productive cough    Nova GaveGela MD    Telemedicine    4 days ago Close exposure to COVID-19 virus    [address]    Nurse Only    1 week ago Acute recurrent pansinusitis    [facility], [address], Gela Shaikh MD    Telemedicine    3 months ago Vitamin D deficiency    [email or web address] Endocrinology Sami Wooten MD    Office Visit    4 months ago Personal history of skin cancer    [facility] Dermatology Dao Johnson MD    Office Visit           Future Appointments         Provider Department Appt Notes    Today 1 [facility] X-ray - [place] Verbal Consent needed, pt will try to sign in though [employer]. Reg Hotline # given- NB    In 1 month Rosalia Johnston MD [facility] Dermatology skin check    In 2 months Sami Wooten, [address] [facility] Endocrinology 6 months and Prolia     In 3 months Juliocesar Morales 19 Hematology Oncology FOLLOW UP VISIT. CL  1Y

## 2022-03-24 ENCOUNTER — NURSE ONLY (OUTPATIENT)
Dept: LAB | Age: 67
End: 2022-03-24
Attending: INTERNAL MEDICINE
Payer: MEDICARE

## 2022-03-24 ENCOUNTER — NURSE TRIAGE (OUTPATIENT)
Dept: INTERNAL MEDICINE CLINIC | Facility: CLINIC | Age: 67
End: 2022-03-24

## 2022-03-24 DIAGNOSIS — Z20.822 ENCOUNTER FOR SCREENING LABORATORY TESTING FOR COVID-19 VIRUS: ICD-10-CM

## 2022-03-24 NOTE — TELEPHONE ENCOUNTER
Spoke with patient ( verified) and relayed Dr. Chucky Coreas message below--patient verbalizes understanding and agrees to Covid re-test--order placed to repeat today per PCP recommendations. Patient will schedule Covid test online. Patient also asking, if she is Covid +, what are her treatment options. Relayed to patient Covid is a virus, so symptomatic treatment is advised--confirmed with Dr. Cori Gonzalez and he agrees-per patient request. Discussed home care measures, rest, hydration (half strength Pedialyte), BRAT diet, avoid any greasy or spicy food. Monitor temperature, O2 sat and pulse at least twice/day and record. May take Tylenol/NSAIDs as need for fever/body aches--to follow 's recommendations as to quantity and frequency--to call back if symptoms slightly worsen, to ER if suddenly worsen-patient verbalizes understanding and agreement. No further questions/concerns at this time.

## 2022-03-24 NOTE — TELEPHONE ENCOUNTER
Chest x-ray shows no infection. Since she is not responding with doxycycline and there is no evidence of lung infection she has no other symptoms this is likely viral, this may be Covid and she could have been tested too soon or inaccurately, continue with albuterol. She may want to reconsider COVID-19 testing again. If she would like this please sign a verbal order under my name or send me the order for review. Thanks.

## 2022-03-24 NOTE — TELEPHONE ENCOUNTER
Patient had a video visit with Dr Maite Liao yesterday and did a chest x-ray yesterday. This morning at 6:30am had a fever of 103.2 F. Patient took tylenol and temperature at 8am was 101.2 F. Vomited once last night about 3 hours after taking antibiotic, but admits that took antibiotic on an empty stomach. Cough is alittle better today, thinks that the albuterol is helping. Patient wanted to update doctor. Advised patient that doctor has not reviewed the chest x-ray yet, but shows no acute findings. Advised to push fluids and get plenty of rest. If any concerns to give us a call back. Patient agreed.

## 2022-03-25 LAB — SARS-COV-2 RNA RESP QL NAA+PROBE: NOT DETECTED

## 2022-04-25 NOTE — TELEPHONE ENCOUNTER
Pt requesting 1 year supply  Refill passed per [facility] protocol. Requested Prescriptions   Pending Prescriptions Disp Refills    PANTOPRAZOLE 40 MG Oral Tab EC [Pharmacy Med Name: Pantoprazole Sodium 40 MG Oral Tablet Delayed Release] 90 tablet 3     Sig: TAKE 1 TABLET BY MOUTH IN  THE MORNING BEFORE  BREAKFAST        Gastrointestional Medication Protocol Passed - 4/25/2022  5:29 PM        Passed - Appointment in past 15 or next 3 months               Future Appointments         Provider Department Appt Notes    In 2 days Rudy Mercedes MD [facility] [place] [facility] - [place] Dermatology skin check    In 1 month Duglas De Paz MD [facility], [address], [place] yearly physical  **MEDICARE AHA**    In 1 month Meir Cheney MD [facility] Endocrinology 6 months and Prolia     In 1 month [facility] [address] [facility]     In 2 months Juliocesar Jacomeador 19 Hematology Oncology FOLLOW UP VISIT. CL  1Y            Recent Outpatient Visits              1 month ago Encounter for screening laboratory testing for COVID-19 virus    [address]    Nurse Only    1 month ago Productive cough    [address]    Telemedicine    1 month ago Close exposure to COVID-19 virus    [address]    Nurse Only    1 month ago Acute recurrent pansinusitis    [address] MD    Telemedicine    4 months ago Vitamin D deficiency    [place] [facility] Endocrinology Meir Cheney MD    Office Visit

## 2022-04-26 NOTE — PROGRESS NOTES
HPI:     Chief Complaint     Lesion        HPI     Lesion      Additional comments: LOV 1/9/20. pt presenting today with lesions of concern. pt requesting full body skin check.  pt has HX of AK's,BCC          Last edited by KENDY Candelario on 4/15/202 17 g by mouth daily. (Patient not taking: Reported on 4/15/2021 )     • triamcinolone acetonide 0.1 % Mouth/Throat Paste Place 1 Tube onto teeth 2 (two) times daily.  For canker sores  In  Mouth (Patient not taking: Reported on 4/15/2021 ) 5 g 1   • [place] Anne Foy MD at [facility] 53 HEAD/NECK/FACE Right 12/1/2020    Performed by Quincy Orona MD at [facility] MAIN OR   • LUMPECTOMY RIGHT Right 2007    and multiple sentinel lymph node excisions   • RADIATION RIGHT Right 2008    right alexa sunburns in the past: Yes        Tanning Salons in the Past: No        Hx of Radiation Treatments: Yes        Regular use of sun block: Not Asked    Social History Narrative      Not on file    Social Determinants of Health  Financial Resource Strain:     patient is alert, oriented, and appears their stated age.   Patient is well nourished and in no distress    The exam was remarkable for the following:  - there is no evidence of recurrent basal cell carcinoma from the back or the chin  - melanocytic nevi ar no

## 2022-04-27 ENCOUNTER — OFFICE VISIT (OUTPATIENT)
Dept: DERMATOLOGY CLINIC | Facility: CLINIC | Age: 67
End: 2022-04-27
Payer: COMMERCIAL

## 2022-04-27 DIAGNOSIS — D22.9 MULTIPLE MELANOCYTIC NEVI: ICD-10-CM

## 2022-04-27 DIAGNOSIS — Z85.828 HISTORY OF NONMELANOMA SKIN CANCER: ICD-10-CM

## 2022-04-27 DIAGNOSIS — Z87.2 HISTORY OF ACTINIC KERATOSES: ICD-10-CM

## 2022-04-27 DIAGNOSIS — L72.0 EPIDERMAL CYST: ICD-10-CM

## 2022-04-27 DIAGNOSIS — L82.1 SEBORRHEIC KERATOSES: ICD-10-CM

## 2022-04-27 DIAGNOSIS — L81.4 SOLAR LENTIGO: Primary | ICD-10-CM

## 2022-04-27 PROCEDURE — 99213 OFFICE O/P EST LOW 20 MIN: CPT | Performed by: DERMATOLOGY

## 2022-04-29 RX ORDER — PANTOPRAZOLE SODIUM 40 MG/1
TABLET, DELAYED RELEASE ORAL
Qty: 90 TABLET | Refills: 3 | Status: SHIPPED | OUTPATIENT
Start: 2022-04-29

## 2022-05-19 ENCOUNTER — TELEPHONE (OUTPATIENT)
Dept: ENDOCRINOLOGY CLINIC | Facility: CLINIC | Age: 67
End: 2022-05-19

## 2022-05-19 NOTE — TELEPHONE ENCOUNTER
Last Prolia injection was on 12/13/21. Patient will be due for next injection on or after 06/14/22. Submitted IV to [facility] - how to [place]. Waiting for SOB, 2-5 business days.

## 2022-05-25 ENCOUNTER — OFFICE VISIT (OUTPATIENT)
Dept: INTERNAL MEDICINE CLINIC | Facility: CLINIC | Age: 67
End: 2022-05-25
Payer: COMMERCIAL

## 2022-05-25 VITALS
WEIGHT: 170 LBS | BODY MASS INDEX: 30.12 KG/M2 | DIASTOLIC BLOOD PRESSURE: 68 MMHG | SYSTOLIC BLOOD PRESSURE: 131 MMHG | HEIGHT: 63 IN | HEART RATE: 73 BPM

## 2022-05-25 DIAGNOSIS — J45.20 MILD INTERMITTENT ASTHMA WITHOUT COMPLICATION: ICD-10-CM

## 2022-05-25 DIAGNOSIS — K21.9 GASTROESOPHAGEAL REFLUX DISEASE WITHOUT ESOPHAGITIS: ICD-10-CM

## 2022-05-25 DIAGNOSIS — G47.9 SLEEP DISTURBANCE: ICD-10-CM

## 2022-05-25 DIAGNOSIS — E78.00 PURE HYPERCHOLESTEROLEMIA: ICD-10-CM

## 2022-05-25 DIAGNOSIS — E53.8 VITAMIN B12 DEFICIENCY: ICD-10-CM

## 2022-05-25 DIAGNOSIS — Z20.822 ENCOUNTER FOR SCREENING LABORATORY TESTING FOR COVID-19 VIRUS: Primary | ICD-10-CM

## 2022-05-25 DIAGNOSIS — C50.411 MALIGNANT NEOPLASM OF UPPER-OUTER QUADRANT OF RIGHT BREAST IN FEMALE, ESTROGEN RECEPTOR NEGATIVE (HCC): ICD-10-CM

## 2022-05-25 DIAGNOSIS — Z17.1 MALIGNANT NEOPLASM OF UPPER-OUTER QUADRANT OF RIGHT BREAST IN FEMALE, ESTROGEN RECEPTOR NEGATIVE (HCC): ICD-10-CM

## 2022-05-25 DIAGNOSIS — Z00.00 MEDICARE ANNUAL WELLNESS VISIT, SUBSEQUENT: ICD-10-CM

## 2022-05-25 PROCEDURE — 3008F BODY MASS INDEX DOCD: CPT | Performed by: INTERNAL MEDICINE

## 2022-05-25 PROCEDURE — 3078F DIAST BP <80 MM HG: CPT | Performed by: INTERNAL MEDICINE

## 2022-05-25 PROCEDURE — G0439 PPPS, SUBSEQ VISIT: HCPCS | Performed by: INTERNAL MEDICINE

## 2022-05-25 PROCEDURE — 96160 PT-FOCUSED HLTH RISK ASSMT: CPT | Performed by: INTERNAL MEDICINE

## 2022-05-25 PROCEDURE — 99397 PER PM REEVAL EST PAT 65+ YR: CPT | Performed by: INTERNAL MEDICINE

## 2022-05-25 PROCEDURE — 3075F SYST BP GE 130 - 139MM HG: CPT | Performed by: INTERNAL MEDICINE

## 2022-05-25 RX ORDER — TRIAMCINOLONE ACETONIDE 0.1 %
PASTE (GRAM) DENTAL
Qty: 1 EACH | Refills: 0 | Status: SHIPPED | OUTPATIENT
Start: 2022-05-25

## 2022-05-25 RX ORDER — MONTELUKAST SODIUM 10 MG/1
10 TABLET ORAL NIGHTLY
Qty: 90 TABLET | Refills: 3 | Status: SHIPPED | OUTPATIENT
Start: 2022-05-25

## 2022-05-25 RX ORDER — MONTELUKAST SODIUM 10 MG/1
10 TABLET ORAL NIGHTLY
Qty: 90 TABLET | Refills: 3 | Status: SHIPPED | OUTPATIENT
Start: 2022-05-25 | End: 2022-05-25

## 2022-06-07 ENCOUNTER — PATIENT MESSAGE (OUTPATIENT)
Dept: ENDOCRINOLOGY CLINIC | Facility: CLINIC | Age: 67
End: 2022-06-07

## 2022-06-07 DIAGNOSIS — M81.0 AGE-RELATED OSTEOPOROSIS WITHOUT CURRENT PATHOLOGICAL FRACTURE: Primary | ICD-10-CM

## 2022-06-07 NOTE — TELEPHONE ENCOUNTER
SOB not available via [employer] portal. Called directly to start PA. Was advise PA is not required for Dx M81.0. Call reference #[id number].

## 2022-06-08 NOTE — TELEPHONE ENCOUNTER
Dr. Eduin Cotto -- please see below. RN pended [employer] for your review/approval.  Thank you.     Future Appointments   Date Time Provider Lazarus Vega   6/14/2022  9:00 AM Kayley Ornelas MD [address] [place] [address]

## 2022-06-08 NOTE — TELEPHONE ENCOUNTER
From: Jackie Edward  To: Ashely Myers MD  Sent: 6/7/2022 4:56 PM CDT  Subject: Do I need bloodwork for appt on June 14? Just wondering if I need bloodwork done for my June 14 appointment. I don't see any orders listed in Upcoming Tests in my [employer] account from Dr. Virginia Kendrick (only from Dr. Ingris Guthrie).   Thanks,  Lonnie Mills

## 2022-06-12 ENCOUNTER — LAB ENCOUNTER (OUTPATIENT)
Dept: LAB | Facility: HOSPITAL | Age: 67
End: 2022-06-12
Attending: INTERNAL MEDICINE
Payer: MEDICARE

## 2022-06-12 DIAGNOSIS — K21.9 GASTROESOPHAGEAL REFLUX DISEASE WITHOUT ESOPHAGITIS: ICD-10-CM

## 2022-06-12 DIAGNOSIS — M81.0 AGE-RELATED OSTEOPOROSIS WITHOUT CURRENT PATHOLOGICAL FRACTURE: ICD-10-CM

## 2022-06-12 DIAGNOSIS — E53.8 VITAMIN B12 DEFICIENCY: ICD-10-CM

## 2022-06-12 DIAGNOSIS — E78.00 PURE HYPERCHOLESTEROLEMIA: ICD-10-CM

## 2022-06-12 LAB
ALBUMIN SERPL-MCNC: 3.8 G/DL (ref 3.4–5)
ALBUMIN/GLOB SERPL: 1.1 {RATIO} (ref 1–2)
ALP LIVER SERPL-CCNC: 57 U/L
ALT SERPL-CCNC: 21 U/L
ANION GAP SERPL CALC-SCNC: 4 MMOL/L (ref 0–18)
AST SERPL-CCNC: 20 U/L (ref 15–37)
BASOPHILS # BLD AUTO: 0.05 X10(3) UL (ref 0–0.2)
BASOPHILS NFR BLD AUTO: 1 %
BILIRUB SERPL-MCNC: 0.5 MG/DL (ref 0.1–2)
BUN BLD-MCNC: 16 MG/DL (ref 7–18)
BUN/CREAT SERPL: 18.2 (ref 10–20)
CALCIUM BLD-MCNC: 9.3 MG/DL (ref 8.5–10.1)
CHLORIDE SERPL-SCNC: 107 MMOL/L (ref 98–112)
CHOLEST SERPL-MCNC: 216 MG/DL (ref ?–200)
CO2 SERPL-SCNC: 30 MMOL/L (ref 21–32)
CREAT BLD-MCNC: 0.88 MG/DL
DEPRECATED RDW RBC AUTO: 46.2 FL (ref 35.1–46.3)
EOSINOPHIL # BLD AUTO: 0.31 X10(3) UL (ref 0–0.7)
EOSINOPHIL NFR BLD AUTO: 6.3 %
ERYTHROCYTE [DISTWIDTH] IN BLOOD BY AUTOMATED COUNT: 12.9 % (ref 11–15)
FASTING PATIENT LIPID ANSWER: YES
FASTING STATUS PATIENT QL REPORTED: YES
GLOBULIN PLAS-MCNC: 3.5 G/DL (ref 2.8–4.4)
GLUCOSE BLD-MCNC: 95 MG/DL (ref 70–99)
HCT VFR BLD AUTO: 39.4 %
HDLC SERPL-MCNC: 80 MG/DL (ref 40–59)
HGB BLD-MCNC: 12.5 G/DL
IMM GRANULOCYTES # BLD AUTO: 0 X10(3) UL (ref 0–1)
IMM GRANULOCYTES NFR BLD: 0 %
LDLC SERPL CALC-MCNC: 123 MG/DL (ref ?–100)
LYMPHOCYTES # BLD AUTO: 1.69 X10(3) UL (ref 1–4)
LYMPHOCYTES NFR BLD AUTO: 34.3 %
MCH RBC QN AUTO: 31 PG (ref 26–34)
MCHC RBC AUTO-ENTMCNC: 31.7 G/DL (ref 31–37)
MCV RBC AUTO: 97.8 FL
MONOCYTES # BLD AUTO: 0.4 X10(3) UL (ref 0.1–1)
MONOCYTES NFR BLD AUTO: 8.1 %
NEUTROPHILS # BLD AUTO: 2.47 X10 (3) UL (ref 1.5–7.7)
NEUTROPHILS # BLD AUTO: 2.47 X10(3) UL (ref 1.5–7.7)
NEUTROPHILS NFR BLD AUTO: 50.3 %
NONHDLC SERPL-MCNC: 136 MG/DL (ref ?–130)
OSMOLALITY SERPL CALC.SUM OF ELEC: 293 MOSM/KG (ref 275–295)
PLATELET # BLD AUTO: 225 10(3)UL (ref 150–450)
POTASSIUM SERPL-SCNC: 3.8 MMOL/L (ref 3.5–5.1)
PROT SERPL-MCNC: 7.3 G/DL (ref 6.4–8.2)
PTH-INTACT SERPL-MCNC: 53.1 PG/ML (ref 18.5–88)
RBC # BLD AUTO: 4.03 X10(6)UL
SODIUM SERPL-SCNC: 141 MMOL/L (ref 136–145)
TRIGL SERPL-MCNC: 75 MG/DL (ref 30–149)
TSI SER-ACNC: 1.25 MIU/ML (ref 0.36–3.74)
VIT B12 SERPL-MCNC: 364 PG/ML (ref 193–986)
VIT D+METAB SERPL-MCNC: 72.6 NG/ML (ref 30–100)
VLDLC SERPL CALC-MCNC: 13 MG/DL (ref 0–30)
WBC # BLD AUTO: 4.9 X10(3) UL (ref 4–11)

## 2022-06-12 PROCEDURE — 80053 COMPREHEN METABOLIC PANEL: CPT

## 2022-06-12 PROCEDURE — 84443 ASSAY THYROID STIM HORMONE: CPT

## 2022-06-12 PROCEDURE — 80061 LIPID PANEL: CPT

## 2022-06-12 PROCEDURE — 82306 VITAMIN D 25 HYDROXY: CPT

## 2022-06-12 PROCEDURE — 36415 COLL VENOUS BLD VENIPUNCTURE: CPT

## 2022-06-12 PROCEDURE — 82607 VITAMIN B-12: CPT

## 2022-06-12 PROCEDURE — 84080 ASSAY ALKALINE PHOSPHATASES: CPT

## 2022-06-12 PROCEDURE — 83970 ASSAY OF PARATHORMONE: CPT

## 2022-06-12 PROCEDURE — 85025 COMPLETE CBC W/AUTO DIFF WBC: CPT

## 2022-06-14 ENCOUNTER — OFFICE VISIT (OUTPATIENT)
Dept: ENDOCRINOLOGY CLINIC | Facility: CLINIC | Age: 67
End: 2022-06-14
Payer: COMMERCIAL

## 2022-06-14 VITALS
SYSTOLIC BLOOD PRESSURE: 124 MMHG | HEART RATE: 67 BPM | WEIGHT: 170 LBS | DIASTOLIC BLOOD PRESSURE: 71 MMHG | BODY MASS INDEX: 30 KG/M2

## 2022-06-14 DIAGNOSIS — M81.0 AGE-RELATED OSTEOPOROSIS WITHOUT CURRENT PATHOLOGICAL FRACTURE: Primary | ICD-10-CM

## 2022-06-14 LAB — BONE SPECIFIC ALKALINE PHOSPHATASE: 7.4 UG/L

## 2022-06-14 PROCEDURE — 3074F SYST BP LT 130 MM HG: CPT | Performed by: INTERNAL MEDICINE

## 2022-06-14 PROCEDURE — 3078F DIAST BP <80 MM HG: CPT | Performed by: INTERNAL MEDICINE

## 2022-06-14 PROCEDURE — 99213 OFFICE O/P EST LOW 20 MIN: CPT | Performed by: INTERNAL MEDICINE

## 2022-06-15 DIAGNOSIS — E55.9 VITAMIN D DEFICIENCY: Primary | ICD-10-CM

## 2022-06-20 ENCOUNTER — HOSPITAL ENCOUNTER (OUTPATIENT)
Dept: MAMMOGRAPHY | Facility: HOSPITAL | Age: 67
Discharge: HOME | End: 2022-06-20
Attending: INTERNAL MEDICINE
Payer: MEDICARE

## 2022-06-20 DIAGNOSIS — Z12.31 SCREENING MAMMOGRAM, ENCOUNTER FOR: ICD-10-CM

## 2022-06-20 PROCEDURE — 77067 SCR MAMMO BI INCL CAD: CPT | Performed by: INTERNAL MEDICINE

## 2022-06-20 PROCEDURE — 77063 BREAST TOMOSYNTHESIS BI: CPT | Performed by: INTERNAL MEDICINE

## 2022-06-23 ENCOUNTER — TELEPHONE (OUTPATIENT)
Dept: ENDOCRINOLOGY CLINIC | Facility: CLINIC | Age: 67
End: 2022-06-23

## 2022-06-23 NOTE — TELEPHONE ENCOUNTER
Patient received Prolia injection on 06/14/22. SOB received, stating PA is needed. Called insurance directly and was advised PA is NOT required since Dx code is non-cancer related.    Sent to scanning with reference # [id number]

## 2022-06-29 ENCOUNTER — TELEPHONE (OUTPATIENT)
Dept: OBGYN CLINIC | Facility: CLINIC | Age: 67
End: 2022-06-29

## 2022-06-29 NOTE — TELEPHONE ENCOUNTER
Pt has an appt with 7/8/22 EMB. She has seen JLK in the pass. Pt has vaginal itching for a few weeks that comes and goes. The itching is external and she has used Vagisil. Denies abnormal discharge and no odor. Denies with urination no urgency, frequency, burning an pain. Informed pt to use Monistat 3 and Monistat 7. Informed pt that she should keep her appt. Sent to [facility] [id number] if any other addl recs.

## 2022-06-29 NOTE — TELEPHONE ENCOUNTER
Pt is having vaginal itching. Pt has apt with Bishop Vega on 7/8.  Pt would like to discuss care measures in the meantime

## 2022-06-30 NOTE — TELEPHONE ENCOUNTER
If her external itching has been going on for a few weeks intermittently, be seen first before any treatment

## 2022-07-08 ENCOUNTER — OFFICE VISIT (OUTPATIENT)
Dept: OBGYN CLINIC | Facility: CLINIC | Age: 67
End: 2022-07-08
Payer: COMMERCIAL

## 2022-07-08 VITALS
BODY MASS INDEX: 31 KG/M2 | WEIGHT: 172.19 LBS | DIASTOLIC BLOOD PRESSURE: 78 MMHG | HEART RATE: 79 BPM | SYSTOLIC BLOOD PRESSURE: 123 MMHG

## 2022-07-08 DIAGNOSIS — N89.8 VAGINAL ITCHING: Primary | ICD-10-CM

## 2022-07-08 PROCEDURE — 3078F DIAST BP <80 MM HG: CPT | Performed by: NURSE PRACTITIONER

## 2022-07-08 PROCEDURE — 1126F AMNT PAIN NOTED NONE PRSNT: CPT | Performed by: NURSE PRACTITIONER

## 2022-07-08 PROCEDURE — 3074F SYST BP LT 130 MM HG: CPT | Performed by: NURSE PRACTITIONER

## 2022-07-08 PROCEDURE — 99213 OFFICE O/P EST LOW 20 MIN: CPT | Performed by: NURSE PRACTITIONER

## 2022-07-08 RX ORDER — MELATONIN
1000 DAILY
COMMUNITY

## 2022-07-08 RX ORDER — CYANOCOBALAMIN (VITAMIN B-12) 1000 MCG
1 TABLET, EXTENDED RELEASE ORAL DAILY
COMMUNITY

## 2022-07-11 ENCOUNTER — OFFICE VISIT (OUTPATIENT)
Dept: OBGYN CLINIC | Facility: CLINIC | Age: 67
End: 2022-07-11
Payer: COMMERCIAL

## 2022-07-11 VITALS
SYSTOLIC BLOOD PRESSURE: 141 MMHG | WEIGHT: 174.38 LBS | HEART RATE: 72 BPM | BODY MASS INDEX: 31 KG/M2 | DIASTOLIC BLOOD PRESSURE: 80 MMHG

## 2022-07-11 DIAGNOSIS — L98.9 DISORDER OF SKIN OF VULVA: ICD-10-CM

## 2022-07-11 DIAGNOSIS — N89.8 VAGINAL ITCHING: Primary | ICD-10-CM

## 2022-07-11 NOTE — PROGRESS NOTES
Procedure Performed:  Vulvar Biopsy    Indications:  Vaginal itching and hypopigmentation on bilateral labia    Risks, benefits, alternatives, and indications of procedure reviewed with patient. The patient voiced clear understanding and desired to proceed. Consent for a vulvar punch biopsy was signed by patient. The vulva was then prepped with Betadine. Anesthesia was performed with an injection of 1% Lidocaine. A 4 mm punch biopsy was placed on the left anterior vulva lateral to clitoral myers. The biopsy tissue was elevated with forceps and excised using a scapel. Hemostatsis was achieved using silver nitrate. Patient tolerated the procedure well. There were no complications. Patient instructed to keep area clean with soap and water, apply topical bacitracin twice a day. Call if fever, increased pain, swelling or redness. May use tylenol or ibuprofen prn pain. May also apply ice to area and sitz baths explained. The biopsy specimen was sent to pathology    Consider clobetasol taper after results and consider 2 times a week vaginal estrogen after to prevent irritation.     TISHA Galeas

## 2022-07-12 ENCOUNTER — OFFICE VISIT (OUTPATIENT)
Dept: HEMATOLOGY/ONCOLOGY | Facility: HOSPITAL | Age: 67
End: 2022-07-12
Attending: INTERNAL MEDICINE
Payer: MEDICARE

## 2022-07-12 VITALS
TEMPERATURE: 99 F | DIASTOLIC BLOOD PRESSURE: 68 MMHG | HEART RATE: 72 BPM | HEIGHT: 63 IN | OXYGEN SATURATION: 96 % | WEIGHT: 173 LBS | BODY MASS INDEX: 30.65 KG/M2 | RESPIRATION RATE: 18 BRPM | SYSTOLIC BLOOD PRESSURE: 144 MMHG

## 2022-07-12 DIAGNOSIS — Z08 ENCOUNTER FOR FOLLOW-UP SURVEILLANCE OF BREAST CANCER: ICD-10-CM

## 2022-07-12 DIAGNOSIS — Z85.3 HISTORY OF RIGHT BREAST CANCER: Primary | ICD-10-CM

## 2022-07-12 DIAGNOSIS — Z12.31 SCREENING MAMMOGRAM FOR BREAST CANCER: ICD-10-CM

## 2022-07-12 DIAGNOSIS — Z85.3 ENCOUNTER FOR FOLLOW-UP SURVEILLANCE OF BREAST CANCER: ICD-10-CM

## 2022-07-12 PROCEDURE — 99213 OFFICE O/P EST LOW 20 MIN: CPT | Performed by: INTERNAL MEDICINE

## 2022-07-13 ENCOUNTER — TELEPHONE (OUTPATIENT)
Dept: OBGYN CLINIC | Facility: CLINIC | Age: 67
End: 2022-07-13

## 2022-07-13 RX ORDER — CLOBETASOL PROPIONATE 0.5 MG/G
1 OINTMENT TOPICAL AS DIRECTED
Qty: 1 EACH | Refills: 0 | Status: SHIPPED | OUTPATIENT
Start: 2022-07-13

## 2022-07-13 NOTE — TELEPHONE ENCOUNTER
Spoke with patient about vulvar bx results. Will treat with course of topical clobetasol then rev'd a&d or aquaphor prn. Follow up in 6-8 weeks  Patient reports bx site healing well.

## 2022-07-15 ENCOUNTER — PATIENT MESSAGE (OUTPATIENT)
Dept: OBGYN CLINIC | Facility: CLINIC | Age: 67
End: 2022-07-15

## 2022-07-15 ENCOUNTER — PATIENT MESSAGE (OUTPATIENT)
Dept: INTERNAL MEDICINE CLINIC | Facility: CLINIC | Age: 67
End: 2022-07-15

## 2022-08-24 ENCOUNTER — OFFICE VISIT (OUTPATIENT)
Dept: OBGYN CLINIC | Facility: CLINIC | Age: 67
End: 2022-08-24
Payer: COMMERCIAL

## 2022-08-24 ENCOUNTER — PATIENT MESSAGE (OUTPATIENT)
Dept: OBGYN CLINIC | Facility: CLINIC | Age: 67
End: 2022-08-24

## 2022-08-24 VITALS
DIASTOLIC BLOOD PRESSURE: 78 MMHG | BODY MASS INDEX: 31 KG/M2 | SYSTOLIC BLOOD PRESSURE: 131 MMHG | HEART RATE: 61 BPM | WEIGHT: 172.38 LBS

## 2022-08-24 DIAGNOSIS — N95.2 POST-MENOPAUSAL ATROPHIC VAGINITIS: ICD-10-CM

## 2022-08-24 DIAGNOSIS — L28.0 LICHEN SIMPLEX CHRONICUS: Primary | ICD-10-CM

## 2022-08-24 PROCEDURE — 99214 OFFICE O/P EST MOD 30 MIN: CPT | Performed by: NURSE PRACTITIONER

## 2022-08-24 PROCEDURE — 3075F SYST BP GE 130 - 139MM HG: CPT | Performed by: NURSE PRACTITIONER

## 2022-08-24 PROCEDURE — 1126F AMNT PAIN NOTED NONE PRSNT: CPT | Performed by: NURSE PRACTITIONER

## 2022-08-24 PROCEDURE — 3078F DIAST BP <80 MM HG: CPT | Performed by: NURSE PRACTITIONER

## 2022-08-24 RX ORDER — CONJUGATED ESTROGENS 0.62 MG/G
0.5 CREAM VAGINAL
Qty: 42.5 G | Refills: 2 | Status: SHIPPED | OUTPATIENT
Start: 2022-08-24 | End: 2022-08-25

## 2022-08-24 RX ORDER — ESTRADIOL 0.1 MG/G
1 CREAM VAGINAL
Qty: 1 EACH | Refills: 1 | Status: SHIPPED | OUTPATIENT
Start: 2022-08-24 | End: 2022-08-24

## 2022-08-24 NOTE — TELEPHONE ENCOUNTER
From: Evie Reyna  To: TISHA Sotelo  Sent: 8/24/2022 12:15 PM CDT  Subject: Prescription for Estradiol    Hi Verneice Neat,  Good to see you this morning. Having found out the cost of the Estradiol, [facility] and learned that I will have to pay $62.91 for the tube (full price since it is less than the copay for this drug). The mail order cost is also pretty expensive. You mentioned that there might be another (lower cost) option. Would that be Premarin cream? [facility] thought that would be a $40 copay.   Thanks,  Vinita Shi

## 2022-08-24 NOTE — TELEPHONE ENCOUNTER
To EMB to please review pts mychart regarding Estradiol cream and cost, asking for alternative. Thank you.

## 2022-08-25 ENCOUNTER — PATIENT MESSAGE (OUTPATIENT)
Dept: OBGYN CLINIC | Facility: CLINIC | Age: 67
End: 2022-08-25

## 2022-08-25 RX ORDER — ESTRADIOL 0.1 MG/G
1 CREAM VAGINAL
Qty: 1 EACH | Refills: 1 | Status: SHIPPED | OUTPATIENT
Start: 2022-08-25

## 2022-08-25 NOTE — TELEPHONE ENCOUNTER
Spoke with Keshawn Morales from pts pharmacy. Keshawn Morales stated that the 42.5g tube is the only size tube that is made for Premarin. Keshawn Morales stated that if pt decided to go with the 0.1% generic estrace cream it would be $62 out of pocket. Pt informed.

## 2022-08-25 NOTE — TELEPHONE ENCOUNTER
I think the 42.5g is one tube. Please call the pharmacy. If the cost is that high, then the patient may prefer the initial estrace cream prescription (see [facility]).

## 2022-09-06 ENCOUNTER — PATIENT MESSAGE (OUTPATIENT)
Dept: OBGYN CLINIC | Facility: CLINIC | Age: 67
End: 2022-09-06

## 2022-09-06 NOTE — TELEPHONE ENCOUNTER
From: Kin Montana  To: TISHA Mock  Sent: 9/6/2022 8:58 AM CDT  Subject: Itching    Hi Henry Renee,  Thanks for sorting out the estradiol prescription - sorry it was such a hassle. I just wanted to follow up with you about using it, as I'm very itchy. I've used the estradiol 3 times (8/27, 8/31, 9/3). I'm wondering if I'm allergic to it or if I've got a yeast infection. I don't have any white patches (that I can see) and the itching seems to be located nearer to my vagina than the previous itching issue.   Thanks,  Wong Villanueva

## 2022-09-07 ENCOUNTER — OFFICE VISIT (OUTPATIENT)
Dept: OBGYN CLINIC | Facility: CLINIC | Age: 67
End: 2022-09-07
Payer: COMMERCIAL

## 2022-09-07 VITALS — DIASTOLIC BLOOD PRESSURE: 81 MMHG | HEART RATE: 58 BPM | SYSTOLIC BLOOD PRESSURE: 131 MMHG

## 2022-09-07 DIAGNOSIS — N89.8 VAGINAL DISCHARGE: ICD-10-CM

## 2022-09-07 DIAGNOSIS — L28.0 LICHEN SIMPLEX, CHRONIC: ICD-10-CM

## 2022-09-07 DIAGNOSIS — N89.8 VAGINAL IRRITATION: Primary | ICD-10-CM

## 2022-09-07 PROCEDURE — 3075F SYST BP GE 130 - 139MM HG: CPT | Performed by: NURSE PRACTITIONER

## 2022-09-07 PROCEDURE — 3079F DIAST BP 80-89 MM HG: CPT | Performed by: NURSE PRACTITIONER

## 2022-09-07 PROCEDURE — 99213 OFFICE O/P EST LOW 20 MIN: CPT | Performed by: NURSE PRACTITIONER

## 2022-09-09 LAB
GENITAL VAGINOSIS SCREEN: POSITIVE
TRICHOMONAS SCREEN: NEGATIVE

## 2022-09-10 ENCOUNTER — TELEPHONE (OUTPATIENT)
Dept: OBGYN CLINIC | Facility: CLINIC | Age: 67
End: 2022-09-10

## 2022-09-10 RX ORDER — METRONIDAZOLE 500 MG/1
500 TABLET ORAL 2 TIMES DAILY
Qty: 14 TABLET | Refills: 0 | Status: SHIPPED | OUTPATIENT
Start: 2022-09-10 | End: 2022-09-17

## 2022-09-10 NOTE — TELEPHONE ENCOUNTER
----- Message from TISHA Ruvalcaba sent at 9/9/2022  8:07 AM CDT -----  +BV, please order oral flagyl 500 mg PO BID x 7 days.     TISHA Ruvalcaba

## 2022-09-10 NOTE — TELEPHONE ENCOUNTER
Pt advised of below recs per EMB and no ETOH while on the medication . Pt asking is should continue with vaginal cream prescribed for itching while on flagyl. States is still applying it once a day then will be once a week. States symptoms has improved while on the medication. Advised will ask EMB and let her know. Pt agrees and states understanding.

## 2022-09-23 ENCOUNTER — LAB ENCOUNTER (OUTPATIENT)
Dept: LAB | Age: 67
End: 2022-09-23
Attending: INTERNAL MEDICINE

## 2022-09-23 DIAGNOSIS — E55.9 VITAMIN D DEFICIENCY: ICD-10-CM

## 2022-09-23 LAB — VIT D+METAB SERPL-MCNC: 50.7 NG/ML (ref 30–100)

## 2022-09-23 PROCEDURE — 82306 VITAMIN D 25 HYDROXY: CPT

## 2022-09-23 PROCEDURE — 36415 COLL VENOUS BLD VENIPUNCTURE: CPT

## 2022-10-26 ENCOUNTER — PATIENT MESSAGE (OUTPATIENT)
Dept: INTERNAL MEDICINE CLINIC | Facility: CLINIC | Age: 67
End: 2022-10-26

## 2022-10-27 NOTE — TELEPHONE ENCOUNTER
From: Angelika Juárez  To: Thom Quinones MD  Sent: 10/26/2022 6:03 PM CDT  Subject: Update record of vaccinations    Hello,    Would you please update my record to include the following?     10/13/2022 FLUZONE HD INJ 2022-23  10/25/2022 [address] [place] [address]#[id number]    Thank you,  Marvin Dallas

## 2022-10-29 ENCOUNTER — PATIENT MESSAGE (OUTPATIENT)
Dept: INTERNAL MEDICINE CLINIC | Facility: CLINIC | Age: 67
End: 2022-10-29

## 2022-11-01 NOTE — TELEPHONE ENCOUNTER
From: Kenan Powell  To: Drea Luke MD  Sent: 10/26/2022 6:03 PM CDT  Subject: Update record of vaccinations    Hello,    Would you please update my record to include the following?     10/13/2022 FLUZONE HD INJ 2022-23  10/25/2022 [facility] PER#[id number]    Thank you,  Lucilla Boas

## 2023-01-05 ENCOUNTER — TELEPHONE (OUTPATIENT)
Dept: ENDOCRINOLOGY CLINIC | Facility: CLINIC | Age: 68
End: 2023-01-05

## 2023-01-05 DIAGNOSIS — E55.9 VITAMIN D DEFICIENCY: ICD-10-CM

## 2023-01-05 DIAGNOSIS — M81.0 AGE-RELATED OSTEOPOROSIS WITHOUT CURRENT PATHOLOGICAL FRACTURE: Primary | ICD-10-CM

## 2023-01-05 NOTE — TELEPHONE ENCOUNTER
Pt's last Prolia injection was on 6/14/22. Pt will be due for next injection on or after 12/14/22.        Submitted Prolia IV via [employer] portal  Awaiting SOB, 3-5 business days

## 2023-01-05 NOTE — TELEPHONE ENCOUNTER
CMA/RMA,     Please assist on doing insurance verification. Pt was due for the injection 12/14/22. This is a Dr. Ibis Wagoner patient so she needs to be seen. Will send this message to Dr. Ibis Wagoner also to advise on appointment. LOV: 06/14/22.

## 2023-01-05 NOTE — TELEPHONE ENCOUNTER
Pt is calling because she says she is due for a prolia shot and did not receive a call/notification for scheduling for the injection.

## 2023-01-06 NOTE — TELEPHONE ENCOUNTER
Will route back MA basket to follow up on insurance verification and scheduling appointment as below. Labs entered per written order.

## 2023-01-11 NOTE — TELEPHONE ENCOUNTER
Per [employer], patients insurance policy was terminated on 01/04/23. Called patient to confirm if she has new insurance. Patient reports she received new cards and provided policy and group #'s (same as insurance cards scanned in epic). Re-submitted via [employer]. Waiting for response.

## 2023-01-11 NOTE — TELEPHONE ENCOUNTER
[place] or [facility]/As,     Is this what you are looking for? Below information looks like it's her pharmacy benefit plan.

## 2023-01-11 NOTE — TELEPHONE ENCOUNTER
[facility] called in to update pts information for insurance, RX Bin [id number] RX group [id number] PCN [id number] [id number] and segment # 000 related to the prescription.  If needed the claims address is [address] [place] [address] provider line for [facility] [phone] please follow up and please call pt

## 2023-02-02 NOTE — TELEPHONE ENCOUNTER
Patient is calling because she has not heard from nurse. Patient is upset because she should have had her prolia injection in December. Patient just wants a call back.

## 2023-02-03 NOTE — TELEPHONE ENCOUNTER
I called pt to advise her of the SOB that we received for her Prolia IV and scheduled her for Feb. 13th @ 10:15 am. I advised pt to call her insurance company to obtain what her OOP will be for Prolia injection before coming to appt. I informed her that the provider ordered labs and that she will need to get them done before coming into the office for Prolia injection.

## 2023-02-03 NOTE — TELEPHONE ENCOUNTER
Spoke to patient who stated that current insurance plan is still active, upset that no one has gotten back to her. RN resubmitted PA on [employer] portal to verify benefits. Received SOB on [employer], given to [facility]  for review.

## 2023-02-03 NOTE — TELEPHONE ENCOUNTER
I looked on [employer] for an update on SOB for Prolia. Per letter states that pt's insurance has termed on 1/10/23. Please notify pt to see if pt has new insurance.  I printed the letter and handed to Julián Byers

## 2023-02-03 NOTE — TELEPHONE ENCOUNTER
Pt's last Prolia injection was on 6/14/22. Pt will be due for next injection on or after 12/14/22. Pt has an upcoming appt on \"not scheduled\" for Prolia Injection with AM    Received pt's Prolia SOB via [employer] 2/3/23    PA Required: Yes  Prolia OOP COST: 5%  Facility Fee: No  Admin Fee: $30    Called [facility] at [phone] and spoke with Lauro Zacarias Ref # [id number]. Per Lauro Zacarias states that based on DX: M81.0 no authorization is required.

## 2023-02-03 NOTE — TELEPHONE ENCOUNTER
Called patient back to assist.  She wanted to find out roughtly how much 5% would be. RN explained to patient staff is not allowed to get a rough estimate on how much OOP would be given that claim needs to go through insurance first and they determine how much OOP is after all the contractual adjustment is done. RN did let patient know that the medication alone costs roughly about $1200. RN nicely explained to patient that we don't give an estimate of how much she could be billed due to issues in the past where patients were given an estimate and it did not match to the bill they got later on. Therefore, to avoid any issues we are only allowed to give what the OOP cost % would be. Pt voiced understanding. Pt was wondering if it would be cheaper if she goes through RX [facility] and bring the medication here for administration. Informed her RN will check with supervisor to see if this would be okay. RN explained that it may need PA, which patient understood. Will reach out to patient on Monday.

## 2023-02-03 NOTE — TELEPHONE ENCOUNTER
Pt is calling wanting to speak with Charlette in regards to her prolia benefits and what was discussed earlier

## 2023-02-10 ENCOUNTER — LAB ENCOUNTER (OUTPATIENT)
Dept: LAB | Facility: HOSPITAL | Age: 68
End: 2023-02-10
Attending: INTERNAL MEDICINE
Payer: MEDICARE

## 2023-02-10 DIAGNOSIS — M81.0 AGE-RELATED OSTEOPOROSIS WITHOUT CURRENT PATHOLOGICAL FRACTURE: Primary | ICD-10-CM

## 2023-02-10 LAB
ALBUMIN SERPL-MCNC: 3.7 G/DL (ref 3.4–5)
ALBUMIN/GLOB SERPL: 1 {RATIO} (ref 1–2)
ALP LIVER SERPL-CCNC: 72 U/L
ALT SERPL-CCNC: 22 U/L
ANION GAP SERPL CALC-SCNC: 5 MMOL/L (ref 0–18)
AST SERPL-CCNC: 16 U/L (ref 15–37)
BILIRUB SERPL-MCNC: 0.5 MG/DL (ref 0.1–2)
BUN BLD-MCNC: 23 MG/DL (ref 7–18)
BUN/CREAT SERPL: 24.5 (ref 10–20)
CALCIUM BLD-MCNC: 9.6 MG/DL (ref 8.5–10.1)
CHLORIDE SERPL-SCNC: 108 MMOL/L (ref 98–112)
CO2 SERPL-SCNC: 30 MMOL/L (ref 21–32)
CREAT BLD-MCNC: 0.94 MG/DL
FASTING STATUS PATIENT QL REPORTED: YES
GFR SERPLBLD BASED ON 1.73 SQ M-ARVRAT: 66 ML/MIN/1.73M2 (ref 60–?)
GLOBULIN PLAS-MCNC: 3.7 G/DL (ref 2.8–4.4)
GLUCOSE BLD-MCNC: 108 MG/DL (ref 70–99)
OSMOLALITY SERPL CALC.SUM OF ELEC: 300 MOSM/KG (ref 275–295)
POTASSIUM SERPL-SCNC: 3.9 MMOL/L (ref 3.5–5.1)
PROT SERPL-MCNC: 7.4 G/DL (ref 6.4–8.2)
PTH-INTACT SERPL-MCNC: 35.2 PG/ML (ref 18.5–88)
SODIUM SERPL-SCNC: 143 MMOL/L (ref 136–145)
VIT D+METAB SERPL-MCNC: 49.1 NG/ML (ref 30–100)

## 2023-02-10 PROCEDURE — 83970 ASSAY OF PARATHORMONE: CPT | Performed by: INTERNAL MEDICINE

## 2023-02-10 PROCEDURE — 82306 VITAMIN D 25 HYDROXY: CPT | Performed by: INTERNAL MEDICINE

## 2023-02-10 PROCEDURE — 80053 COMPREHEN METABOLIC PANEL: CPT | Performed by: INTERNAL MEDICINE

## 2023-02-10 PROCEDURE — 84080 ASSAY ALKALINE PHOSPHATASES: CPT

## 2023-02-10 PROCEDURE — 36415 COLL VENOUS BLD VENIPUNCTURE: CPT | Performed by: INTERNAL MEDICINE

## 2023-02-11 LAB — BONE SPECIFIC ALKALINE PHOSPHATASE: 12.6 UG/L

## 2023-02-13 ENCOUNTER — OFFICE VISIT (OUTPATIENT)
Dept: ENDOCRINOLOGY CLINIC | Facility: CLINIC | Age: 68
End: 2023-02-13

## 2023-02-13 VITALS
SYSTOLIC BLOOD PRESSURE: 125 MMHG | BODY MASS INDEX: 31 KG/M2 | DIASTOLIC BLOOD PRESSURE: 72 MMHG | HEART RATE: 79 BPM | WEIGHT: 177 LBS

## 2023-02-13 DIAGNOSIS — E55.9 VITAMIN D DEFICIENCY: ICD-10-CM

## 2023-02-13 DIAGNOSIS — M81.0 AGE-RELATED OSTEOPOROSIS WITHOUT CURRENT PATHOLOGICAL FRACTURE: Primary | ICD-10-CM

## 2023-02-13 PROCEDURE — 3074F SYST BP LT 130 MM HG: CPT | Performed by: INTERNAL MEDICINE

## 2023-02-13 PROCEDURE — 3078F DIAST BP <80 MM HG: CPT | Performed by: INTERNAL MEDICINE

## 2023-02-13 PROCEDURE — 99213 OFFICE O/P EST LOW 20 MIN: CPT | Performed by: INTERNAL MEDICINE

## 2023-02-13 PROCEDURE — 96372 THER/PROPH/DIAG INJ SC/IM: CPT | Performed by: INTERNAL MEDICINE

## 2023-02-22 ENCOUNTER — TELEPHONE (OUTPATIENT)
Dept: INTERNAL MEDICINE CLINIC | Facility: CLINIC | Age: 68
End: 2023-02-22

## 2023-02-22 NOTE — TELEPHONE ENCOUNTER
Patient is eligible for a 2023 Medicare Annual Wellness visit with PCP or NP. This visit can be scheduled any time in the calendar year. Left message to call back.

## 2023-03-30 RX ORDER — PANTOPRAZOLE SODIUM 40 MG/1
TABLET, DELAYED RELEASE ORAL
Qty: 90 TABLET | Refills: 3 | Status: SHIPPED | OUTPATIENT
Start: 2023-03-30

## 2023-03-30 NOTE — TELEPHONE ENCOUNTER
Refill passed per [place] [employer], [facility] protocol.   Requested Prescriptions   Pending Prescriptions Disp Refills    PANTOPRAZOLE 40 MG Oral Tab EC [Pharmacy Med Name: Pantoprazole Sodium 40 MG Oral Tablet Delayed Release] 90 tablet 3     Sig: TAKE 1 TABLET BY MOUTH IN  THE MORNING BEFORE  BREAKFAST       Gastrointestional Medication Protocol Passed - 3/30/2023  3:44 AM        Passed - In person appointment or virtual visit in the past 12 mos or appointment in next 3 mos     Recent Outpatient Visits              1 month ago Age-related osteoporosis without current pathological fracture    [facility], [address] [facility], Geremias Vides MD    Office Visit    6 months ago Vaginal irritation    [address], [address], 2021 [facility], APRN    Office Visit    7 months ago Lichen simplex chronicus    [facility], [address], [address] [facility], [facility], APRN    Office Visit    8 months ago History of right breast cancer    [facility] AND [facility] Hematology Oncology Fanny Sanon MD    Office Visit    8 months ago Vaginal itching    [address], [address], 2021 [facility], [facility] [place], APRN    Office Visit          Future Appointments         Provider Department Appt Notes    In 2 months Reynaldo Gagnon MD [address], [address] just yearly visit    In 2 months Guido Aquino MD [address], [facility] full skin check                   Recent Outpatient Visits              1 month ago Age-related osteoporosis without current pathological fracture    Cely Smiley MD    Office Visit    6 months ago Vaginal irritation    [address], [address], [address] [facility], [facility], APRN    Office Visit    7 months ago Lichen simplex chronicus    [facility], [address] [place] [address], Andi Brown, APRN    Office Visit    8 months ago History of right breast cancer    [facility] AND [facility] Hematology Oncology Rosette Sever, MD    Office Visit    8 months ago Vaginal itching    [facility], [address] [facility],[address], 2021 [facility], Andi Brown, APRN    Office Visit          Future Appointments         Provider Department Appt Notes    In 2 months MD Kierra Galindo, [address] [place] [address] just yearly visit    In 2 months MD Kierra Anand [facility] full skin check

## 2023-05-24 ENCOUNTER — OFFICE VISIT (OUTPATIENT)
Dept: OBGYN CLINIC | Facility: CLINIC | Age: 68
End: 2023-05-24

## 2023-05-24 VITALS
DIASTOLIC BLOOD PRESSURE: 82 MMHG | WEIGHT: 170.81 LBS | HEART RATE: 87 BPM | BODY MASS INDEX: 30 KG/M2 | SYSTOLIC BLOOD PRESSURE: 142 MMHG

## 2023-05-24 DIAGNOSIS — N95.2 POST-MENOPAUSAL ATROPHIC VAGINITIS: ICD-10-CM

## 2023-05-24 DIAGNOSIS — N94.10 DYSPAREUNIA IN FEMALE: ICD-10-CM

## 2023-05-24 DIAGNOSIS — L28.0 LICHEN SIMPLEX CHRONICUS: Primary | ICD-10-CM

## 2023-05-24 PROCEDURE — 1126F AMNT PAIN NOTED NONE PRSNT: CPT | Performed by: NURSE PRACTITIONER

## 2023-05-24 PROCEDURE — 99213 OFFICE O/P EST LOW 20 MIN: CPT | Performed by: NURSE PRACTITIONER

## 2023-05-24 PROCEDURE — 1170F FXNL STATUS ASSESSED: CPT | Performed by: NURSE PRACTITIONER

## 2023-05-24 PROCEDURE — 1160F RVW MEDS BY RX/DR IN RCRD: CPT | Performed by: NURSE PRACTITIONER

## 2023-05-24 PROCEDURE — 3077F SYST BP >= 140 MM HG: CPT | Performed by: NURSE PRACTITIONER

## 2023-05-24 PROCEDURE — 3079F DIAST BP 80-89 MM HG: CPT | Performed by: NURSE PRACTITIONER

## 2023-05-24 PROCEDURE — 1159F MED LIST DOCD IN RCRD: CPT | Performed by: NURSE PRACTITIONER

## 2023-05-25 ENCOUNTER — TELEPHONE (OUTPATIENT)
Dept: OBGYN CLINIC | Facility: CLINIC | Age: 68
End: 2023-05-25

## 2023-05-25 NOTE — TELEPHONE ENCOUNTER
Pt called, offered appt with JLK on 6/26 w/CHAVAK in [address] [place] [address] for annual and f/u on issues if no improvement with EMB recs.

## 2023-05-25 NOTE — TELEPHONE ENCOUNTER
Pt is calling she has been seeing Tawny Hyde for some issues .  Tawny Hyde suggest she see DR Andrea Rivera her Joyceetta Norco   There is nothing till October

## 2023-05-31 ENCOUNTER — OFFICE VISIT (OUTPATIENT)
Dept: DERMATOLOGY CLINIC | Facility: CLINIC | Age: 68
End: 2023-05-31

## 2023-05-31 DIAGNOSIS — L82.1 SEBORRHEIC KERATOSES: ICD-10-CM

## 2023-05-31 DIAGNOSIS — Z85.828 PERSONAL HISTORY OF SKIN CANCER: ICD-10-CM

## 2023-05-31 DIAGNOSIS — Z85.828 HISTORY OF NONMELANOMA SKIN CANCER: ICD-10-CM

## 2023-05-31 DIAGNOSIS — L72.0 EPIDERMAL CYST: ICD-10-CM

## 2023-05-31 DIAGNOSIS — L81.4 SOLAR LENTIGO: ICD-10-CM

## 2023-05-31 DIAGNOSIS — Z87.2 HISTORY OF ACTINIC KERATOSES: ICD-10-CM

## 2023-05-31 DIAGNOSIS — D22.9 MULTIPLE MELANOCYTIC NEVI: Primary | ICD-10-CM

## 2023-05-31 PROCEDURE — 99213 OFFICE O/P EST LOW 20 MIN: CPT | Performed by: DERMATOLOGY

## 2023-05-31 PROCEDURE — 1159F MED LIST DOCD IN RCRD: CPT | Performed by: DERMATOLOGY

## 2023-05-31 PROCEDURE — 1160F RVW MEDS BY RX/DR IN RCRD: CPT | Performed by: DERMATOLOGY

## 2023-06-02 ENCOUNTER — PATIENT MESSAGE (OUTPATIENT)
Dept: OBGYN CLINIC | Facility: CLINIC | Age: 68
End: 2023-06-02

## 2023-06-02 NOTE — TELEPHONE ENCOUNTER
From: Naresh Mcneal  To: GarretTISHA Mathew  Sent: 6/2/2023 3:09 PM CDT  Subject: Follow up    Rufino Zamora,    Just wanted to drop you a note, to let you know that I think the area you were hoping would be better within 2 weeks (from May 24) is actually getting worse. The area does not hurt or itch. I've been putting Aquaphor on the area, but not the clobetasol. Still, per your instruction, I think I will need an appointment with you next week. You/your team can just schedule an appointment for me or call me to schedule [phone]. On the plus side the Good Clean Love vaginal gel seems to be working like a charm - no itching! Also FYI I have an appointment with Dr Kike Carlos on June 24.   Thanks, Wesley Burgos

## 2023-06-08 ENCOUNTER — OFFICE VISIT (OUTPATIENT)
Dept: OBGYN CLINIC | Facility: CLINIC | Age: 68
End: 2023-06-08

## 2023-06-08 VITALS
DIASTOLIC BLOOD PRESSURE: 78 MMHG | BODY MASS INDEX: 32 KG/M2 | WEIGHT: 178.81 LBS | SYSTOLIC BLOOD PRESSURE: 132 MMHG | HEART RATE: 77 BPM

## 2023-06-08 DIAGNOSIS — L28.0 LICHEN SIMPLEX CHRONICUS: Primary | ICD-10-CM

## 2023-06-08 PROCEDURE — 3078F DIAST BP <80 MM HG: CPT | Performed by: NURSE PRACTITIONER

## 2023-06-08 PROCEDURE — 1159F MED LIST DOCD IN RCRD: CPT | Performed by: NURSE PRACTITIONER

## 2023-06-08 PROCEDURE — 1160F RVW MEDS BY RX/DR IN RCRD: CPT | Performed by: NURSE PRACTITIONER

## 2023-06-08 PROCEDURE — 99212 OFFICE O/P EST SF 10 MIN: CPT | Performed by: NURSE PRACTITIONER

## 2023-06-08 PROCEDURE — 3075F SYST BP GE 130 - 139MM HG: CPT | Performed by: NURSE PRACTITIONER

## 2023-06-26 ENCOUNTER — OFFICE VISIT (OUTPATIENT)
Dept: OBGYN CLINIC | Facility: CLINIC | Age: 68
End: 2023-06-26

## 2023-06-26 VITALS
HEART RATE: 69 BPM | BODY MASS INDEX: 32 KG/M2 | DIASTOLIC BLOOD PRESSURE: 80 MMHG | WEIGHT: 179 LBS | SYSTOLIC BLOOD PRESSURE: 128 MMHG

## 2023-06-26 DIAGNOSIS — L90.0 LICHEN SCLEROSUS: Primary | ICD-10-CM

## 2023-06-26 PROCEDURE — 99213 OFFICE O/P EST LOW 20 MIN: CPT | Performed by: OBSTETRICS & GYNECOLOGY

## 2023-06-26 PROCEDURE — 3079F DIAST BP 80-89 MM HG: CPT | Performed by: OBSTETRICS & GYNECOLOGY

## 2023-06-26 PROCEDURE — 1160F RVW MEDS BY RX/DR IN RCRD: CPT | Performed by: OBSTETRICS & GYNECOLOGY

## 2023-06-26 PROCEDURE — 3074F SYST BP LT 130 MM HG: CPT | Performed by: OBSTETRICS & GYNECOLOGY

## 2023-06-26 PROCEDURE — 1126F AMNT PAIN NOTED NONE PRSNT: CPT | Performed by: OBSTETRICS & GYNECOLOGY

## 2023-06-26 PROCEDURE — 1170F FXNL STATUS ASSESSED: CPT | Performed by: OBSTETRICS & GYNECOLOGY

## 2023-06-26 PROCEDURE — 1159F MED LIST DOCD IN RCRD: CPT | Performed by: OBSTETRICS & GYNECOLOGY

## 2023-06-26 RX ORDER — CLOBETASOL PROPIONATE 0.5 MG/G
1 OINTMENT TOPICAL AS DIRECTED
Qty: 30 G | Refills: 1 | Status: SHIPPED | OUTPATIENT
Start: 2023-06-26

## 2023-06-27 ENCOUNTER — HOSPITAL ENCOUNTER (OUTPATIENT)
Dept: MAMMOGRAPHY | Age: 68
Discharge: HOME | End: 2023-06-27
Attending: INTERNAL MEDICINE
Payer: MEDICARE

## 2023-06-27 DIAGNOSIS — Z12.31 SCREENING MAMMOGRAM FOR BREAST CANCER: ICD-10-CM

## 2023-06-27 PROCEDURE — 77067 SCR MAMMO BI INCL CAD: CPT | Performed by: INTERNAL MEDICINE

## 2023-06-27 PROCEDURE — 77063 BREAST TOMOSYNTHESIS BI: CPT | Performed by: INTERNAL MEDICINE

## 2023-07-07 ENCOUNTER — PATIENT MESSAGE (OUTPATIENT)
Dept: OBGYN CLINIC | Facility: CLINIC | Age: 68
End: 2023-07-07

## 2023-07-07 NOTE — TELEPHONE ENCOUNTER
From: Mike Lopez  To: Cheri Werner MD  Sent: 7/7/2023 2:31 PM CDT  Subject: Follow-up to my visit on June 26    Eloisa Thomas,  When I saw you on June 26, we talked about me using Vagifem and that I should check with Dr Arch Sandifer (due to fact that I am a breast cancer survivor). I thought I had an appointment with her on July 12, but it's not until July 24. So I sent her a message through [employer]. Per Dr Arch Sandifer ok to use the Vagifem.    Thank you,  Marychuy De Paz

## 2023-07-10 RX ORDER — ESTRADIOL 10 UG/1
10 INSERT VAGINAL
Qty: 12 TABLET | Refills: 3 | Status: SHIPPED | OUTPATIENT
Start: 2023-07-10 | End: 2023-07-10

## 2023-07-10 RX ORDER — ESTRADIOL 10 UG/1
10 INSERT VAGINAL
Qty: 12 TABLET | Refills: 3 | Status: SHIPPED | OUTPATIENT
Start: 2023-07-10 | End: 2023-08-09

## 2023-07-10 NOTE — TELEPHONE ENCOUNTER
Pt [facility] Pharmacy updated. Med pended to JLK to sign due to allergy pop up.

## 2023-07-10 NOTE — TELEPHONE ENCOUNTER
To JLK to please advise, when order for Vagifem is placed, warning for allergy/ contraindication to estradiol (itching) pops up. OK to still order? Thank you.

## 2023-07-12 ENCOUNTER — OFFICE VISIT (OUTPATIENT)
Dept: INTERNAL MEDICINE CLINIC | Facility: CLINIC | Age: 68
End: 2023-07-12

## 2023-07-12 VITALS
SYSTOLIC BLOOD PRESSURE: 120 MMHG | BODY MASS INDEX: 31.25 KG/M2 | HEIGHT: 63 IN | WEIGHT: 176.38 LBS | DIASTOLIC BLOOD PRESSURE: 67 MMHG | HEART RATE: 72 BPM

## 2023-07-12 DIAGNOSIS — Z85.828 PERSONAL HISTORY OF SKIN CANCER: ICD-10-CM

## 2023-07-12 DIAGNOSIS — Z00.00 MEDICARE ANNUAL WELLNESS VISIT, SUBSEQUENT: Primary | ICD-10-CM

## 2023-07-12 DIAGNOSIS — J45.20 MILD INTERMITTENT ASTHMA WITHOUT COMPLICATION: ICD-10-CM

## 2023-07-12 DIAGNOSIS — E53.8 VITAMIN B12 DEFICIENCY: ICD-10-CM

## 2023-07-12 DIAGNOSIS — L57.0 ACTINIC KERATOSIS: ICD-10-CM

## 2023-07-12 DIAGNOSIS — E78.00 PURE HYPERCHOLESTEROLEMIA: ICD-10-CM

## 2023-07-12 DIAGNOSIS — M17.0 PRIMARY OSTEOARTHRITIS OF BOTH KNEES: ICD-10-CM

## 2023-07-12 DIAGNOSIS — E55.9 VITAMIN D DEFICIENCY: ICD-10-CM

## 2023-07-12 DIAGNOSIS — M81.0 AGE-RELATED OSTEOPOROSIS WITHOUT CURRENT PATHOLOGICAL FRACTURE: ICD-10-CM

## 2023-07-12 DIAGNOSIS — D48.5 NEOPLASM OF UNCERTAIN BEHAVIOR OF SKIN: ICD-10-CM

## 2023-07-12 DIAGNOSIS — G47.9 SLEEP DISTURBANCE: ICD-10-CM

## 2023-07-12 DIAGNOSIS — Z17.1 MALIGNANT NEOPLASM OF RIGHT BREAST IN FEMALE, ESTROGEN RECEPTOR NEGATIVE, UNSPECIFIED SITE OF BREAST: ICD-10-CM

## 2023-07-12 DIAGNOSIS — C50.911 MALIGNANT NEOPLASM OF RIGHT BREAST IN FEMALE, ESTROGEN RECEPTOR NEGATIVE, UNSPECIFIED SITE OF BREAST: ICD-10-CM

## 2023-07-12 PROCEDURE — 1125F AMNT PAIN NOTED PAIN PRSNT: CPT | Performed by: INTERNAL MEDICINE

## 2023-07-12 PROCEDURE — 3078F DIAST BP <80 MM HG: CPT | Performed by: INTERNAL MEDICINE

## 2023-07-12 PROCEDURE — 3074F SYST BP LT 130 MM HG: CPT | Performed by: INTERNAL MEDICINE

## 2023-07-12 PROCEDURE — 96160 PT-FOCUSED HLTH RISK ASSMT: CPT | Performed by: INTERNAL MEDICINE

## 2023-07-12 PROCEDURE — G0439 PPPS, SUBSEQ VISIT: HCPCS | Performed by: INTERNAL MEDICINE

## 2023-07-12 PROCEDURE — 1159F MED LIST DOCD IN RCRD: CPT | Performed by: INTERNAL MEDICINE

## 2023-07-12 PROCEDURE — 3008F BODY MASS INDEX DOCD: CPT | Performed by: INTERNAL MEDICINE

## 2023-07-12 PROCEDURE — 1160F RVW MEDS BY RX/DR IN RCRD: CPT | Performed by: INTERNAL MEDICINE

## 2023-07-12 PROCEDURE — 1170F FXNL STATUS ASSESSED: CPT | Performed by: INTERNAL MEDICINE

## 2023-07-18 ENCOUNTER — PATIENT MESSAGE (OUTPATIENT)
Dept: INTERNAL MEDICINE CLINIC | Facility: CLINIC | Age: 68
End: 2023-07-18

## 2023-07-21 ENCOUNTER — TELEPHONE (OUTPATIENT)
Dept: ENDOCRINOLOGY CLINIC | Facility: CLINIC | Age: 68
End: 2023-07-21

## 2023-07-24 ENCOUNTER — OFFICE VISIT (OUTPATIENT)
Dept: HEMATOLOGY/ONCOLOGY | Facility: HOSPITAL | Age: 68
End: 2023-07-24
Attending: INTERNAL MEDICINE
Payer: MEDICARE

## 2023-07-24 ENCOUNTER — PATIENT MESSAGE (OUTPATIENT)
Dept: ENDOCRINOLOGY CLINIC | Facility: CLINIC | Age: 68
End: 2023-07-24

## 2023-07-24 VITALS
BODY MASS INDEX: 31.36 KG/M2 | HEART RATE: 70 BPM | WEIGHT: 177 LBS | RESPIRATION RATE: 16 BRPM | TEMPERATURE: 99 F | DIASTOLIC BLOOD PRESSURE: 61 MMHG | SYSTOLIC BLOOD PRESSURE: 139 MMHG | HEIGHT: 63 IN | OXYGEN SATURATION: 97 %

## 2023-07-24 DIAGNOSIS — Z85.3 ENCOUNTER FOR FOLLOW-UP SURVEILLANCE OF BREAST CANCER: ICD-10-CM

## 2023-07-24 DIAGNOSIS — Z85.3 HISTORY OF RIGHT BREAST CANCER: Primary | ICD-10-CM

## 2023-07-24 DIAGNOSIS — Z08 ENCOUNTER FOR FOLLOW-UP SURVEILLANCE OF BREAST CANCER: ICD-10-CM

## 2023-07-24 DIAGNOSIS — Z12.31 SCREENING MAMMOGRAM FOR BREAST CANCER: ICD-10-CM

## 2023-07-24 PROCEDURE — 99213 OFFICE O/P EST LOW 20 MIN: CPT | Performed by: INTERNAL MEDICINE

## 2023-07-24 NOTE — TELEPHONE ENCOUNTER
From: Destini Osborn  To: nAahi Rodriguez MD  Sent: 7/24/2023 9:38 AM CDT  Subject: Next visit and Lottie Mejia,    When I saw you in February, you recommended that I follow up with you at this time to arrange for a dexascan order, lab work and to schedule my next appointment. My last dexascan was Sept 30 2021, so my next dexascan should be Oct 1 or later. Please confirm that this is still the plan and that I can schedule these appointments.     Thank you,  Nara Valenzuela

## 2023-07-24 NOTE — TELEPHONE ENCOUNTER
Dr. Katie Ruiz, see patient message. Looks like she is due for OV and prolia in August. No openings until October. Prolia labs are already ordered.

## 2023-07-25 NOTE — TELEPHONE ENCOUNTER
Tried calling patient. No answer. Left message to call back.    RN also responded to patient's message and offered 8/15/23 10:30 AM.

## 2023-07-26 NOTE — TELEPHONE ENCOUNTER
Pt's last Prolia injection was on 2/13/23. Pt will be due for next injection on or after 8/13/23. Pt has an upcoming appt on 8/21/23 for Prolia Injection with MD    Received pt's Prolia SOB via [phone] fax    PA Required:Yes  Prolia OOP COST: 5%  Facility Fee: No  Admin Fee: $30    Fax sent to scanning     ---------------------------------------------------------------  Medication  CGM  pump supply Requested: (Denosumab)  Prolia                                                             CoverMyMeds Used: No    Key: N/a    Sig: Inject 60mg/ mL into the skin every 6 months. DX Code: M81.0                                       Case Number/Pending Ref#: N/a      Routing over to PA Dept for assistance, thanks.

## 2023-07-28 NOTE — TELEPHONE ENCOUNTER
Pt's last Prolia injection was on 2/13/23. Pt will be due for next injection on or after 8/13/23. Pt has an upcoming appt on 8/21/23 for Prolia Injection with MD  Medication  CGM  pump supply Requested: (Denosumab)  Prolia                                                     CoverMyMeds Used: No  Key: N/a  Sig: Inject 60mg/ mL into the skin every 6 months. DX Code: M81.0                                         Per [facility] online, Prolia, 2 doses approved from 08/15/2023-08/15/2024, [employer] Appl #  [id number].

## 2023-08-18 ENCOUNTER — LAB ENCOUNTER (OUTPATIENT)
Dept: LAB | Facility: HOSPITAL | Age: 68
End: 2023-08-18
Attending: INTERNAL MEDICINE
Payer: MEDICARE

## 2023-08-18 DIAGNOSIS — M81.0 OSTEOPOROSIS: Primary | ICD-10-CM

## 2023-08-18 LAB
ALBUMIN SERPL-MCNC: 3.6 G/DL (ref 3.4–5)
ALBUMIN/GLOB SERPL: 1.1 {RATIO} (ref 1–2)
ALP LIVER SERPL-CCNC: 62 U/L
ALT SERPL-CCNC: 21 U/L
ANION GAP SERPL CALC-SCNC: 7 MMOL/L (ref 0–18)
AST SERPL-CCNC: 21 U/L (ref 15–37)
BILIRUB SERPL-MCNC: 0.6 MG/DL (ref 0.1–2)
BUN BLD-MCNC: 20 MG/DL (ref 7–18)
BUN/CREAT SERPL: 23 (ref 10–20)
CALCIUM BLD-MCNC: 9.1 MG/DL (ref 8.5–10.1)
CHLORIDE SERPL-SCNC: 111 MMOL/L (ref 98–112)
CO2 SERPL-SCNC: 24 MMOL/L (ref 21–32)
CREAT BLD-MCNC: 0.87 MG/DL
EGFRCR SERPLBLD CKD-EPI 2021: 72 ML/MIN/1.73M2 (ref 60–?)
FASTING STATUS PATIENT QL REPORTED: YES
GLOBULIN PLAS-MCNC: 3.4 G/DL (ref 2.8–4.4)
GLUCOSE BLD-MCNC: 98 MG/DL (ref 70–99)
OSMOLALITY SERPL CALC.SUM OF ELEC: 297 MOSM/KG (ref 275–295)
POTASSIUM SERPL-SCNC: 3.8 MMOL/L (ref 3.5–5.1)
PROT SERPL-MCNC: 7 G/DL (ref 6.4–8.2)
PTH-INTACT SERPL-MCNC: 68 PG/ML (ref 18.5–88)
SODIUM SERPL-SCNC: 142 MMOL/L (ref 136–145)
VIT D+METAB SERPL-MCNC: 42.7 NG/ML (ref 30–100)

## 2023-08-18 PROCEDURE — 83970 ASSAY OF PARATHORMONE: CPT | Performed by: INTERNAL MEDICINE

## 2023-08-18 PROCEDURE — 82306 VITAMIN D 25 HYDROXY: CPT | Performed by: INTERNAL MEDICINE

## 2023-08-18 PROCEDURE — 80053 COMPREHEN METABOLIC PANEL: CPT | Performed by: INTERNAL MEDICINE

## 2023-08-18 PROCEDURE — 84080 ASSAY ALKALINE PHOSPHATASES: CPT

## 2023-08-18 PROCEDURE — 36415 COLL VENOUS BLD VENIPUNCTURE: CPT | Performed by: INTERNAL MEDICINE

## 2023-08-21 ENCOUNTER — OFFICE VISIT (OUTPATIENT)
Dept: ENDOCRINOLOGY CLINIC | Facility: CLINIC | Age: 68
End: 2023-08-21

## 2023-08-21 VITALS
BODY MASS INDEX: 31 KG/M2 | WEIGHT: 177 LBS | DIASTOLIC BLOOD PRESSURE: 73 MMHG | SYSTOLIC BLOOD PRESSURE: 129 MMHG | HEART RATE: 71 BPM

## 2023-08-21 DIAGNOSIS — M81.0 AGE-RELATED OSTEOPOROSIS WITHOUT CURRENT PATHOLOGICAL FRACTURE: Primary | ICD-10-CM

## 2023-08-21 LAB — ALKALINE PHOSPHATASE BONE SPECIFIC: 7.9 UG/L

## 2023-08-21 RX ORDER — ESTRADIOL 10 UG/1
INSERT VAGINAL
COMMUNITY
Start: 2023-07-14

## 2023-08-21 NOTE — PROGRESS NOTES
Patient presents to clinic today for Prolia (denosumab) 60 mg/mL in left upper arm. Patient tolerated well.

## 2023-09-07 ENCOUNTER — PATIENT MESSAGE (OUTPATIENT)
Dept: INTERNAL MEDICINE CLINIC | Facility: CLINIC | Age: 68
End: 2023-09-07

## 2023-09-08 NOTE — TELEPHONE ENCOUNTER
From: Destini Osborn  To: Maine Aly MD  Sent: 9/7/2023 10:43 AM CDT  Subject: Padmini Vaccinations    Hello,  We had talked about my getting the Flu vaccine sometime in September, but I was wondering about whether you recommend getting the RSV vaccine as well. If so, which do you recommend I do first? (I can't get two vaccinations on the same day.)    Plus I believe there is an updated Covid vaccine - although I got a Covid vaccine July 18, would you recommend that I get the updated Covid vaccine, maybe towards the end of October? Thank you. Also I haven't forgotten about the bloodwork that was ordered. Ani Ryan I'll get that done soon.   Kyle Heath

## 2023-09-08 NOTE — TELEPHONE ENCOUNTER
Patient can have RSV vaccine- if available in the pharmacy    Since she got the COVID-19  booster in June   she might hold on the COVID-19 at this time-I believe it still not available in the pharmacy      Flu shot  can get in mid-October    Follow-up visit in few months or sooner if any concerns or symptoms

## 2023-09-29 ENCOUNTER — LAB ENCOUNTER (OUTPATIENT)
Dept: LAB | Facility: HOSPITAL | Age: 68
End: 2023-09-29
Attending: INTERNAL MEDICINE
Payer: MEDICARE

## 2023-09-29 LAB
ALBUMIN SERPL-MCNC: 4 G/DL (ref 3.4–5)
ALBUMIN/GLOB SERPL: 1.2 {RATIO} (ref 1–2)
ALP LIVER SERPL-CCNC: 60 U/L
ALT SERPL-CCNC: 21 U/L
ANION GAP SERPL CALC-SCNC: 6 MMOL/L (ref 0–18)
AST SERPL-CCNC: 16 U/L (ref 15–37)
BASOPHILS # BLD AUTO: 0.05 X10(3) UL (ref 0–0.2)
BASOPHILS NFR BLD AUTO: 0.9 %
BILIRUB SERPL-MCNC: 0.5 MG/DL (ref 0.1–2)
BILIRUB UR QL: NEGATIVE
BUN BLD-MCNC: 15 MG/DL (ref 7–18)
BUN/CREAT SERPL: 17 (ref 10–20)
CALCIUM BLD-MCNC: 9.4 MG/DL (ref 8.5–10.1)
CHLORIDE SERPL-SCNC: 110 MMOL/L (ref 98–112)
CHOLEST SERPL-MCNC: 210 MG/DL (ref ?–200)
CO2 SERPL-SCNC: 27 MMOL/L (ref 21–32)
COLOR UR: YELLOW
CREAT BLD-MCNC: 0.88 MG/DL
DEPRECATED RDW RBC AUTO: 44.8 FL (ref 35.1–46.3)
EGFRCR SERPLBLD CKD-EPI 2021: 71 ML/MIN/1.73M2 (ref 60–?)
EOSINOPHIL # BLD AUTO: 0.18 X10(3) UL (ref 0–0.7)
EOSINOPHIL NFR BLD AUTO: 3.4 %
ERYTHROCYTE [DISTWIDTH] IN BLOOD BY AUTOMATED COUNT: 13 % (ref 11–15)
FASTING PATIENT LIPID ANSWER: YES
FASTING STATUS PATIENT QL REPORTED: YES
GLOBULIN PLAS-MCNC: 3.4 G/DL (ref 2.8–4.4)
GLUCOSE BLD-MCNC: 100 MG/DL (ref 70–99)
GLUCOSE UR-MCNC: NORMAL MG/DL
HCT VFR BLD AUTO: 38.1 %
HDLC SERPL-MCNC: 83 MG/DL (ref 40–59)
HGB BLD-MCNC: 12.4 G/DL
IMM GRANULOCYTES # BLD AUTO: 0.01 X10(3) UL (ref 0–1)
IMM GRANULOCYTES NFR BLD: 0.2 %
KETONES UR-MCNC: NEGATIVE MG/DL
LDLC SERPL CALC-MCNC: 117 MG/DL (ref ?–100)
LEUKOCYTE ESTERASE UR QL STRIP.AUTO: 75
LYMPHOCYTES # BLD AUTO: 1.75 X10(3) UL (ref 1–4)
LYMPHOCYTES NFR BLD AUTO: 33.1 %
MCH RBC QN AUTO: 30.6 PG (ref 26–34)
MCHC RBC AUTO-ENTMCNC: 32.5 G/DL (ref 31–37)
MCV RBC AUTO: 94.1 FL
MONOCYTES # BLD AUTO: 0.46 X10(3) UL (ref 0.1–1)
MONOCYTES NFR BLD AUTO: 8.7 %
NEUTROPHILS # BLD AUTO: 2.84 X10 (3) UL (ref 1.5–7.7)
NEUTROPHILS # BLD AUTO: 2.84 X10(3) UL (ref 1.5–7.7)
NEUTROPHILS NFR BLD AUTO: 53.7 %
NITRITE UR QL STRIP.AUTO: NEGATIVE
NONHDLC SERPL-MCNC: 127 MG/DL (ref ?–130)
OSMOLALITY SERPL CALC.SUM OF ELEC: 297 MOSM/KG (ref 275–295)
PH UR: 5.5 [PH] (ref 5–8)
PLATELET # BLD AUTO: 213 10(3)UL (ref 150–450)
POTASSIUM SERPL-SCNC: 4.1 MMOL/L (ref 3.5–5.1)
PROT SERPL-MCNC: 7.4 G/DL (ref 6.4–8.2)
RBC # BLD AUTO: 4.05 X10(6)UL
SODIUM SERPL-SCNC: 143 MMOL/L (ref 136–145)
SP GR UR STRIP: 1.02 (ref 1–1.03)
TRIGL SERPL-MCNC: 56 MG/DL (ref 30–149)
TSI SER-ACNC: 1.3 MIU/ML (ref 0.36–3.74)
UROBILINOGEN UR STRIP-ACNC: NORMAL
VIT B12 SERPL-MCNC: 1407 PG/ML (ref 193–986)
VLDLC SERPL CALC-MCNC: 10 MG/DL (ref 0–30)
WBC # BLD AUTO: 5.3 X10(3) UL (ref 4–11)

## 2023-09-29 PROCEDURE — 82607 VITAMIN B-12: CPT | Performed by: INTERNAL MEDICINE

## 2023-09-29 PROCEDURE — 87086 URINE CULTURE/COLONY COUNT: CPT | Performed by: INTERNAL MEDICINE

## 2023-09-29 PROCEDURE — 80061 LIPID PANEL: CPT | Performed by: INTERNAL MEDICINE

## 2023-09-29 PROCEDURE — 81001 URINALYSIS AUTO W/SCOPE: CPT | Performed by: INTERNAL MEDICINE

## 2023-09-29 PROCEDURE — 85025 COMPLETE CBC W/AUTO DIFF WBC: CPT | Performed by: INTERNAL MEDICINE

## 2023-09-29 PROCEDURE — 36415 COLL VENOUS BLD VENIPUNCTURE: CPT | Performed by: INTERNAL MEDICINE

## 2023-09-29 PROCEDURE — 84443 ASSAY THYROID STIM HORMONE: CPT | Performed by: INTERNAL MEDICINE

## 2023-09-29 PROCEDURE — 80053 COMPREHEN METABOLIC PANEL: CPT | Performed by: INTERNAL MEDICINE

## 2023-10-11 ENCOUNTER — HOSPITAL ENCOUNTER (OUTPATIENT)
Dept: BONE DENSITY | Age: 68
Discharge: HOME | End: 2023-10-11
Attending: INTERNAL MEDICINE
Payer: MEDICARE

## 2023-10-11 DIAGNOSIS — M81.0 AGE-RELATED OSTEOPOROSIS WITHOUT CURRENT PATHOLOGICAL FRACTURE: ICD-10-CM

## 2023-10-11 PROCEDURE — 77080 DXA BONE DENSITY AXIAL: CPT | Performed by: INTERNAL MEDICINE

## 2023-10-19 ENCOUNTER — OFFICE VISIT (OUTPATIENT)
Dept: OBGYN CLINIC | Facility: CLINIC | Age: 68
End: 2023-10-19
Payer: COMMERCIAL

## 2023-10-19 ENCOUNTER — TELEPHONE (OUTPATIENT)
Dept: OBGYN CLINIC | Facility: CLINIC | Age: 68
End: 2023-10-19

## 2023-10-19 VITALS
DIASTOLIC BLOOD PRESSURE: 81 MMHG | BODY MASS INDEX: 31 KG/M2 | SYSTOLIC BLOOD PRESSURE: 137 MMHG | HEART RATE: 71 BPM | WEIGHT: 177.63 LBS

## 2023-10-19 DIAGNOSIS — Z12.4 SCREENING FOR CERVICAL CANCER: ICD-10-CM

## 2023-10-19 DIAGNOSIS — N95.1 VAGINAL DRYNESS, MENOPAUSAL: ICD-10-CM

## 2023-10-19 DIAGNOSIS — Z01.419 ENCOUNTER FOR GYNECOLOGICAL EXAMINATION: Primary | ICD-10-CM

## 2023-10-19 DIAGNOSIS — L90.0 LICHEN SCLEROSUS: ICD-10-CM

## 2023-10-19 PROCEDURE — 1159F MED LIST DOCD IN RCRD: CPT | Performed by: OBSTETRICS & GYNECOLOGY

## 2023-10-19 PROCEDURE — 3079F DIAST BP 80-89 MM HG: CPT | Performed by: OBSTETRICS & GYNECOLOGY

## 2023-10-19 PROCEDURE — G0101 CA SCREEN;PELVIC/BREAST EXAM: HCPCS | Performed by: OBSTETRICS & GYNECOLOGY

## 2023-10-19 PROCEDURE — 3075F SYST BP GE 130 - 139MM HG: CPT | Performed by: OBSTETRICS & GYNECOLOGY

## 2023-10-19 PROCEDURE — 1160F RVW MEDS BY RX/DR IN RCRD: CPT | Performed by: OBSTETRICS & GYNECOLOGY

## 2023-10-19 RX ORDER — ESTRADIOL 10 UG/1
10 INSERT VAGINAL
Qty: 28 TABLET | Refills: 3 | Status: SHIPPED | OUTPATIENT
Start: 2023-10-19 | End: 2023-11-18

## 2023-10-19 NOTE — TELEPHONE ENCOUNTER
Pt states she saw Noé Eastman this morning and CHAVAK was going to send in an rx for Vagifem but pt was not sure about which mail order pharmacy should be used. Pt called her pharmacy and found out it is the [facility] in [place]. Pharmacy chosen as preferred in chart. Message to Noé Eastman to please placed Vagifem rx for pt. Pt informed rx will be sent.

## 2023-10-20 LAB — HPV I/H RISK 1 DNA SPEC QL NAA+PROBE: NEGATIVE

## 2024-01-22 ENCOUNTER — TELEPHONE (OUTPATIENT)
Dept: ENDOCRINOLOGY CLINIC | Facility: CLINIC | Age: 69
End: 2024-01-22

## 2024-01-22 NOTE — TELEPHONE ENCOUNTER
----- Message from Gemma Pineda RN sent at 8/21/2023  9:25 AM CDT -----  Regarding: Prolia  Patient due after 2/21/24.

## 2024-01-22 NOTE — TELEPHONE ENCOUNTER
Patient's last Prolia was on 08/21/23. Patient will be due for next injection on or after 02/21/24.    Patient has an upcoming appointment on 02/23/24 for Prolia injection with MD.    Submitted Prolia IV via [employer] portal. Awaiting SOB, 3-5 business days

## 2024-01-24 NOTE — TELEPHONE ENCOUNTER
Medication  CGM  pump supply Requested: (Denosumab)  Prolia                                                             CoverMyMeds Used: No    Key: N/a    Sig: Inject 60mg/ mL into the skin every 6 months.     DX Code: M81.0                                       Case Number/Pending Ref#: N/a      Routing over to PA Dept for assistance, thanks.

## 2024-02-15 ENCOUNTER — PATIENT MESSAGE (OUTPATIENT)
Dept: ENDOCRINOLOGY CLINIC | Facility: CLINIC | Age: 69
End: 2024-02-15

## 2024-02-15 DIAGNOSIS — M81.0 AGE-RELATED OSTEOPOROSIS WITHOUT CURRENT PATHOLOGICAL FRACTURE: ICD-10-CM

## 2024-02-15 DIAGNOSIS — E55.9 VITAMIN D DEFICIENCY: Primary | ICD-10-CM

## 2024-02-15 NOTE — TELEPHONE ENCOUNTER
From: Maria Esther Foster  To: Marisol Leija  Sent: 2/15/2024 2:37 PM CST  Subject: Blood work for appt on 2/23    Hello,  I have an appointment on 2/23 with Dr Leija. Wll I need bloodwork for that appointment?  Thanks  Maria Esther

## 2024-02-19 NOTE — TELEPHONE ENCOUNTER
Noted below update from PA team.     Future Appointments   Date Time Provider Department Center   2/23/2024  9:00 AM Marisol Leija MD [facility]

## 2024-02-19 NOTE — TELEPHONE ENCOUNTER
PA submitted and approved via [facility] online. Authorization number [id number], valid 2/23/24-2/23/25.

## 2024-02-20 DIAGNOSIS — J45.20 MILD INTERMITTENT ASTHMA, UNSPECIFIED WHETHER COMPLICATED (HCC): ICD-10-CM

## 2024-02-21 ENCOUNTER — LAB ENCOUNTER (OUTPATIENT)
Dept: LAB | Facility: HOSPITAL | Age: 69
End: 2024-02-21
Attending: INTERNAL MEDICINE
Payer: MEDICARE

## 2024-02-21 DIAGNOSIS — M81.0 AGE-RELATED OSTEOPOROSIS WITHOUT CURRENT PATHOLOGICAL FRACTURE: Primary | ICD-10-CM

## 2024-02-21 DIAGNOSIS — E55.9 VITAMIN D DEFICIENCY: ICD-10-CM

## 2024-02-21 LAB
ALBUMIN SERPL-MCNC: 4.4 G/DL (ref 3.2–4.8)
ALBUMIN/GLOB SERPL: 1.6 {RATIO} (ref 1–2)
ALP LIVER SERPL-CCNC: 62 U/L
ALT SERPL-CCNC: 18 U/L
ANION GAP SERPL CALC-SCNC: 6 MMOL/L (ref 0–18)
AST SERPL-CCNC: 24 U/L (ref ?–34)
BILIRUB SERPL-MCNC: 0.6 MG/DL (ref 0.2–1.1)
BUN BLD-MCNC: 11 MG/DL (ref 9–23)
BUN/CREAT SERPL: 13.9 (ref 10–20)
CALCIUM BLD-MCNC: 9.7 MG/DL (ref 8.7–10.4)
CHLORIDE SERPL-SCNC: 109 MMOL/L (ref 98–112)
CO2 SERPL-SCNC: 27 MMOL/L (ref 21–32)
CREAT BLD-MCNC: 0.79 MG/DL
EGFRCR SERPLBLD CKD-EPI 2021: 81 ML/MIN/1.73M2 (ref 60–?)
FASTING STATUS PATIENT QL REPORTED: NO
GLOBULIN PLAS-MCNC: 2.7 G/DL (ref 2.8–4.4)
GLUCOSE BLD-MCNC: 90 MG/DL (ref 70–99)
OSMOLALITY SERPL CALC.SUM OF ELEC: 293 MOSM/KG (ref 275–295)
POTASSIUM SERPL-SCNC: 4.3 MMOL/L (ref 3.5–5.1)
PROT SERPL-MCNC: 7.1 G/DL (ref 5.7–8.2)
PTH-INTACT SERPL-MCNC: 40.5 PG/ML (ref 18.5–88)
SODIUM SERPL-SCNC: 142 MMOL/L (ref 136–145)
VIT D+METAB SERPL-MCNC: 52.2 NG/ML (ref 30–100)

## 2024-02-21 PROCEDURE — 36415 COLL VENOUS BLD VENIPUNCTURE: CPT

## 2024-02-21 PROCEDURE — 82306 VITAMIN D 25 HYDROXY: CPT

## 2024-02-21 PROCEDURE — 84080 ASSAY ALKALINE PHOSPHATASES: CPT

## 2024-02-21 PROCEDURE — 80053 COMPREHEN METABOLIC PANEL: CPT | Performed by: INTERNAL MEDICINE

## 2024-02-21 PROCEDURE — 83970 ASSAY OF PARATHORMONE: CPT | Performed by: INTERNAL MEDICINE

## 2024-02-22 RX ORDER — ALBUTEROL SULFATE 90 UG/1
2 AEROSOL, METERED RESPIRATORY (INHALATION) EVERY 4 HOURS PRN
Qty: 3 EACH | Refills: 3 | Status: SHIPPED | OUTPATIENT
Start: 2024-02-22

## 2024-02-22 NOTE — TELEPHONE ENCOUNTER
Message sent to patient to schedule follow up visit.  Please advise on refill request.      Recent Outpatient Visits              4 months ago Encounter for gynecological examination    [facility] - OB/GYN Chiara Oconnor MD    Office Visit    6 months ago Age-related osteoporosis without current pathological fracture    [facility] Marisol Leija MD    Office Visit    7 months ago History of right breast cancer    [facility] Hematology Oncology González Rogers MD    Office Visit    7 months ago Medicare annual wellness visit, subsequent    [facility] [address], [place] Wes Marinelli MD    Office Visit    8 months ago Lichen sclerosus    [facility] [address], [place] - OB/GYN Chiara Oconnor MD    Office Visit          Future Appointments         Provider Department Appt Notes    In 4 days Marisol Leija MD [facility] 6 months    In 5 months González Rogers MD [facility] Hematology Oncology follow up visit.cl  1y

## 2024-02-24 LAB — ALKALINE PHOSPHATASE BONE SPECIFIC: 9.1 UG/L

## 2024-02-26 ENCOUNTER — OFFICE VISIT (OUTPATIENT)
Dept: ENDOCRINOLOGY CLINIC | Facility: CLINIC | Age: 69
End: 2024-02-26
Payer: COMMERCIAL

## 2024-02-26 VITALS
BODY MASS INDEX: 31.36 KG/M2 | WEIGHT: 177 LBS | SYSTOLIC BLOOD PRESSURE: 124 MMHG | HEART RATE: 72 BPM | DIASTOLIC BLOOD PRESSURE: 78 MMHG | HEIGHT: 62.99 IN

## 2024-02-26 DIAGNOSIS — E55.9 VITAMIN D DEFICIENCY: ICD-10-CM

## 2024-02-26 DIAGNOSIS — M81.0 AGE-RELATED OSTEOPOROSIS WITHOUT CURRENT PATHOLOGICAL FRACTURE: Primary | ICD-10-CM

## 2024-02-26 PROCEDURE — 96372 THER/PROPH/DIAG INJ SC/IM: CPT | Performed by: INTERNAL MEDICINE

## 2024-02-26 PROCEDURE — 1159F MED LIST DOCD IN RCRD: CPT | Performed by: INTERNAL MEDICINE

## 2024-02-26 PROCEDURE — 3074F SYST BP LT 130 MM HG: CPT | Performed by: INTERNAL MEDICINE

## 2024-02-26 PROCEDURE — 99213 OFFICE O/P EST LOW 20 MIN: CPT | Performed by: INTERNAL MEDICINE

## 2024-02-26 PROCEDURE — 3008F BODY MASS INDEX DOCD: CPT | Performed by: INTERNAL MEDICINE

## 2024-02-26 PROCEDURE — 3078F DIAST BP <80 MM HG: CPT | Performed by: INTERNAL MEDICINE

## 2024-02-26 NOTE — PROGRESS NOTES
Return Office Visit     CHIEF COMPLAINT:    Osteoporosis     HISTORY OF PRESENT ILLNESS:  Maria Esther Foster is a 69 year old female who presents for follow up for Osteoporosis.     She was diagnosed with osteoporosis in 2019 on screening DXA.   Has a h/o right side beast cancer, is s/p lumpectomy, radiation and chemo. Hormone -ve.   She is not on hormonal therapy.       Calcium and Vit d intake: citracal with Vit D OTC , Vit D 2000 units daily  Steroid use,  PPI use, chronic pain medication use, Anti-epileptics use :No  RA: no  Exercise: no  She is postmenopausal , in 2007 after chemo  Maternal history of osteoporosis No  History of fractures:  No  History of kidney stones No     Current Smoking No   Alcohol occasional   History of thyroid disease No   History of calcium problems: No   History of Malabsorption/ bariatric surgery: No        She is here for a FU visit  Doing well on prolia  No fractures,no falls    She is s/p 8 injections of prolia    CURRENT MEDICATION:    Current Outpatient Medications   Medication Sig Dispense Refill    albuterol (PROAIR HFA) 108 (90 Base) MCG/ACT Inhalation Aero Soln Inhale 2 puffs into the lungs every 4 (four) hours as needed for Wheezing or Shortness of Breath (cough). 3 each 3    Estradiol 10 MCG Vaginal Tab       montelukast 10 MG Oral Tab Take 1 tablet (10 mg total) by mouth nightly. 90 tablet 3    clobetasol 0.05 % External Ointment Apply 1 Application. topically As Directed. Apply thin layer to affected area twice a day for a week, then once a day for a week, then once every other day for a week 30 g 1    pantoprazole 40 MG Oral Tab EC TAKE 1 TABLET BY MOUTH IN  THE MORNING BEFORE  BREAKFAST 90 tablet 3    Calcium Citrate-Vitamin D 315-250 MG-UNIT Oral Tab Take 1 tablet by mouth daily.      cyanocobalamine 1000 MCG Oral Tab Take 1 tablet (1,000 mcg total) by mouth daily.      SALINE MIST SPRAY NA 2 sprays by Each Nare route 2 (two) times a day.      Cetirizine HCl (ZYRTEC  OR) Take 1 tablet by mouth every morning.      triamcinolone acetonide 0.1 % Mouth/Throat Paste Apply locally On canker sores 2-3 times daily for 3-4 days - as needed 1 each 0    Melatonin 5 MG Oral Tab Take by mouth nightly.      Diclofenac Sodium 1 % External Gel Apply 1 g topically in the morning and 1 g before bedtime.      PEG 3350 17 g Oral Powd Pack Take by mouth. Take every 2-3 days      Vitamin D3 (VITAMIN D3) 2000 UNITS Oral Cap Take 1,000 Units by mouth daily. (Patient not taking: Reported on 10/19/2023)      Olopatadine HCl 0.2 % Ophthalmic Solution 1 drop by Each eye route daily.             ALLERGY:  Allergies   Allergen Reactions    Perfumes ASTHMA     Floral scents and pine,and stargazer lillies    Other RASH and FACE FLUSHING     Dial soap  Scented detergents    Adhesive Tape RASH    Cephalosporins ITCHING    Hydrocodone ITCHING    Duricef [Cefadroxil] ITCHING    Estradiol ITCHING    Methocarbamol RASH     Other name robaxin    Potato DIARRHEA     Potato skins    Sulfanilamide RASH       PAST MEDICAL, SOCIAL AND FAMILY HISTORY:  See past medical history marked as reviewed.  See past surgical history marked as reviewed.  See past family history marked as reviewed.  See past social history marked as reviewed.    ASSESSMENTS:     REVIEW OF SYSTEMS:  Constitutional: Negative for: weight change, fever, fatigue, cold/heat intolerance  Eyes: Negative for:  Visual changes, proptosis, blurring  ENT: Negative for:  dysphagia, neck swelling, dysphonia  Respiratory: Negative for:  dyspnea, cough  Cardiovascular: Negative for:  chest pain, palpitations, orthopnea  GI: Negative for:  abdominal pain, nausea, vomiting, diarrhea, constipation, bleeding  Neurology: Negative for: headache, numbness, weakness  Genito-Urinary: Negative for: dysuria, frequency  Psychiatric: Negative for:  depression, anxiety  Hematology/Lymphatics: Negative for: bruising, lower extremity edema  Endocrine: Negative for: polyuria,  polydypsia  Skin: Negative for: rash, blister, cellulitis,       PHYSICAL EXAM:   Vitals:    02/26/24 1033   BP: 147/85   Pulse: 77   Weight: 177 lb (80.3 kg)   Height: 5' 2.99\" (1.6 m)       BMI: Body mass index is 31.36 kg/m².         General Appearance:  alert, well developed, in no acute distress  Head: Atraumatic  Eyes:  normal conjunctivae, sclera., normal sclera and normal pupils  Throat/Neck: normal sound to voice. Normal hearing, normal speech  Respiratory:  Speaking in full sentences, non-labored. no increased work of breathing, no audible wheezing    Neuro: motor grossly intact, moving all extremities without difficulty  Psychiatric:  oriented to time, self, and place  Extremities: no obvious extremity swelling, no lesions        DATA:     Pertinent data reviewed             DXA 2023:  Compared to the prior study, bone mineral density has increased in the lumbar spine and left hip.     ASSESSMENT AND PLAN:    Patient is a 69 year old female with Osteoporosis.   She has acid reflux and hence oral bisphosphonates are not an ideal option.  S/p 8 injections of prolia  DXA 2023:  Compared to the prior study, bone mineral density has increased in the lumbar spine and left hip.     Bone markers slightly elevated at this time.  However since bone density has improved we will monitor this in 3 months.  We discussed transitioning to Reclast.  Patient states that she might have some upcoming dental work.  Hence she will I would like to avoid Reclast for the next 6 months.  We will proceed with ninth injection of Prolia today.  Discussed that Prolia can also increase risk of common dental infections.  Hence she should let her dentist know that she received the injection today.  I recommend waiting 6 to 8 weeks before starting any dental work.  Patient will keep me posted.  PLAN:     - 9 th injection of prolia today  - Labs reviewed, BS alk phos in 3 months  - Discussed dietary intake of calcium  - Resistance  exercises  - Fall precautions.        RTC in 6 months with me.       Patient verbalized a complete  understanding of all of the above and did not have any further questions.                    Marisol Leija MD

## 2024-03-01 DIAGNOSIS — K21.9 GASTROESOPHAGEAL REFLUX DISEASE WITHOUT ESOPHAGITIS: Primary | ICD-10-CM

## 2024-03-02 RX ORDER — PANTOPRAZOLE SODIUM 40 MG/1
TABLET, DELAYED RELEASE ORAL
Qty: 90 TABLET | Refills: 3 | Status: SHIPPED | OUTPATIENT
Start: 2024-03-02

## 2024-03-02 NOTE — TELEPHONE ENCOUNTER
Refill passed per [facility] protocol    Requested Prescriptions   Pending Prescriptions Disp Refills    PANTOPRAZOLE 40 MG Oral Tab EC [Pharmacy Med Name: Pantoprazole Sodium 40 MG Oral Tablet Delayed Release] 90 tablet 3     Sig: TAKE 1 TABLET BY MOUTH IN  THE MORNING BEFORE  BREAKFAST       Gastrointestional Medication Protocol Passed - 3/1/2024  3:26 AM        Passed - In person appointment or virtual visit in the past 12 mos or appointment in next 3 mos     Recent Outpatient Visits              5 days ago [facility] without current pathological [facility]    [facility]    4 months ago Encounter for gynecological examination    [facility] - OB/GYN Chiara Oconnor MD    Office Visit    6 months ago Age-related osteoporosis without current pathological fracture    [facility]    Office Visit    7 months ago History of right breast cancer    [facility] Hematology Oncology González Rogers MD    Office Visit    7 months ago Medicare annual wellness visit, subsequent    [facility], [place] Vukomanovic, Emela, MD    Office Visit          Future Appointments         Provider Department Appt Notes    In 4 months González Rogers MD [facility] Hematology Oncology follow up visit.cl  1y                    Future Appointments         Provider Department Appt Notes    In 4 months González Rogers MD [facility] Hematology Oncology follow up visit.cl  1y            Recent Outpatient Visits              5 days ago Age-related osteoporosis without current pathological fracture    [facility], Marisol Rose MD    Office Visit    4 months ago Encounter for gynecological examination    [facility]  [place] - OB/GYN Chiara Oconnor MD    Office Visit    6 months ago Age-related osteoporosis without current pathological fracture    [facility], [facility] [place] Marisol Leija MD    Office Visit    7 months ago History of right breast cancer    [facility] Hematology Oncology González Rogers MD    Office Visit    7 months ago Medicare annual wellness visit, subsequent    [facility], [facility], [place] Wes Marinelli MD    Office Visit

## 2024-03-19 ENCOUNTER — PATIENT MESSAGE (OUTPATIENT)
Dept: ENDOCRINOLOGY CLINIC | Facility: CLINIC | Age: 69
End: 2024-03-19

## 2024-03-19 NOTE — TELEPHONE ENCOUNTER
From: Maria Esther Foster  To: Marisol Leija  Sent: 3/19/2024 8:55 AM CDT  Subject: Form for Dentist    Hi Dr Leija,  During my visit on Feb 26, you mentioned that I would need to have my dentist complete a form before I start Reclast treatment. Where do I get this form?    FYI I have a dental cleaning appointment on Thursday, 3/21.    Thanks,   Maria Esther

## 2024-03-22 RX ORDER — ZOLEDRONIC ACID 5 MG/100ML
5 INJECTION, SOLUTION INTRAVENOUS ONCE
Qty: 100 ML | Refills: 0 | Status: SHIPPED | OUTPATIENT
Start: 2024-03-22 | End: 2024-03-22

## 2024-03-25 ENCOUNTER — TELEPHONE (OUTPATIENT)
Dept: HEMATOLOGY/ONCOLOGY | Facility: HOSPITAL | Age: 69
End: 2024-03-25

## 2024-03-25 ENCOUNTER — TELEPHONE (OUTPATIENT)
Dept: ENDOCRINOLOGY CLINIC | Facility: CLINIC | Age: 69
End: 2024-03-25

## 2024-03-25 DIAGNOSIS — M81.0 SENILE OSTEOPOROSIS: Primary | ICD-10-CM

## 2024-03-25 RX ORDER — ZOLEDRONIC ACID 5 MG/100ML
5 INJECTION, SOLUTION INTRAVENOUS ONCE
OUTPATIENT
Start: 2024-03-25

## 2024-03-25 NOTE — TELEPHONE ENCOUNTER
Tess from Dr Leija office called to informed infusion to hold off on scheduling Reclast infusion per Dr Leija due to patient not due for it yet.  Informed her we would remove patient from our que to call.

## 2024-03-25 NOTE — TELEPHONE ENCOUNTER
Please call the infusion center to dis regard the form for reclast  Patient is not due yet   Thanks

## 2024-04-23 ENCOUNTER — TELEPHONE (OUTPATIENT)
Dept: INTERNAL MEDICINE CLINIC | Facility: CLINIC | Age: 69
End: 2024-04-23

## 2024-04-23 NOTE — TELEPHONE ENCOUNTER
Patient is due for Medicare annual wellness visit for 2024 with PCP or coordinating NP.  This can be done anytime in the calendar year.  Left message to call back.

## 2024-06-12 NOTE — TELEPHONE ENCOUNTER
Please Review. Protocol Failed; No Protocol   Future Appointments   Date Time Provider Department Center   7/9/2024  8:40 AM Wes Marinelli MD [id number] [place]       Requested Prescriptions   Pending Prescriptions Disp Refills    MONTELUKAST 10 MG Oral Tab [Pharmacy Med Name: Montelukast Sodium 10 MG Oral Tablet] 90 tablet 3     Sig: TAKE 1 TABLET BY MOUTH NIGHTLY       Asthma & COPD Medication Protocol Failed - 6/8/2024  3:10 AM        Failed - Asthma Action Score greater than or equal to 20        Failed - AAP/ACT given in last 12 months     No data recorded  No data recorded  No data recorded  No data recorded          Passed - Appointment in past 6 or next 3 months      Recent Outpatient Visits              3 months ago Age-related osteoporosis without current pathological fracture    [facility]    Office Visit    7 months ago Encounter for gynecological examination    [facility] - OB/GYN Chiara Oconnor MD    Office Visit    9 months ago Age-related osteoporosis without current pathological fracture    [facility] Mar[facility]    Office Visit    10 months ago History of right breast cancer    [facility] Hematology Oncology González Rogers MD    Office Visit    11 months ago Medicare annual wellness visit, subsequent    [facility] [address], [place] Wes Marinelli MD    Office Visit          Future Appointments         Provider Department Appt Notes    In 2 weeks LMB DEXA RM1; L[facility] SONIA RM1 [facility] Mammography - [place]     In 3 weeks Wes Marinelli MD [facility] [address], [place] PX - last ma px 7/12/23    In 1 month Aniya Rizvi MD [facility] [address], [place] Body Check - LOV 5-31-23  LOM    In 1 month González Rogers MD [facility]  Hematology Oncology follow up visit.cl  1y                           Future Appointments         Provider Department Appt Notes    In 2 weeks LMB DEXA RM1; [facility]MB SONIA RM1 [facility] Mammography - [place]     In 3 weeks Wes Marinelli MD [facility] [address], [place] PX - last ma px 7/12/23    In 1 month Aniya Rizvi MD [facility] [address], [place] Body Check - LOV 5-31-23  LOM    In 1 month González Rogers MD [facility] Hematology Oncology follow up visit.cl  1y          Recent Outpatient Visits              3 months ago Age-related osteoporosis without current pathological fracture    [facility] [place] Marisol Leija MD    Office Visit    7 months ago Encounter for gynecological examination    [facility] - OB/GYN Chiara Oconnor MD    Office Visit    9 months ago Age-related osteoporosis without current pathological fracture    [facility] [place] Marisol Leija MD    Office Visit    10 months ago History of right breast cancer    [facility] Hematology Oncology González Rogers MD    Office Visit    11 months ago Medicare annual wellness visit, subsequent    [facility], [address], [place] Wes Marinelli MD    Office Visit

## 2024-06-13 RX ORDER — MONTELUKAST SODIUM 10 MG/1
10 TABLET ORAL NIGHTLY
Qty: 90 TABLET | Refills: 3 | Status: SHIPPED | OUTPATIENT
Start: 2024-06-13

## 2024-07-01 ENCOUNTER — HOSPITAL ENCOUNTER (OUTPATIENT)
Dept: MAMMOGRAPHY | Age: 69
Discharge: HOME | End: 2024-07-01
Attending: INTERNAL MEDICINE
Payer: MEDICARE

## 2024-07-01 DIAGNOSIS — Z12.31 SCREENING MAMMOGRAM FOR BREAST CANCER: ICD-10-CM

## 2024-07-01 PROCEDURE — 77063 BREAST TOMOSYNTHESIS BI: CPT | Performed by: INTERNAL MEDICINE

## 2024-07-01 PROCEDURE — 77067 SCR MAMMO BI INCL CAD: CPT | Performed by: INTERNAL MEDICINE

## 2024-07-09 ENCOUNTER — OFFICE VISIT (OUTPATIENT)
Dept: INTERNAL MEDICINE CLINIC | Facility: CLINIC | Age: 69
End: 2024-07-09
Payer: COMMERCIAL

## 2024-07-09 VITALS
BODY MASS INDEX: 31.18 KG/M2 | HEART RATE: 69 BPM | DIASTOLIC BLOOD PRESSURE: 85 MMHG | WEIGHT: 176 LBS | HEIGHT: 62.99 IN | SYSTOLIC BLOOD PRESSURE: 139 MMHG

## 2024-07-09 DIAGNOSIS — C50.011 MALIGNANT NEOPLASM OF AREOLA OF RIGHT BREAST IN FEMALE, ESTROGEN RECEPTOR NEGATIVE (HCC): ICD-10-CM

## 2024-07-09 DIAGNOSIS — M81.0 SENILE OSTEOPOROSIS: ICD-10-CM

## 2024-07-09 DIAGNOSIS — Z17.1 MALIGNANT NEOPLASM OF AREOLA OF RIGHT BREAST IN FEMALE, ESTROGEN RECEPTOR NEGATIVE (HCC): ICD-10-CM

## 2024-07-09 DIAGNOSIS — D48.5 NEOPLASM OF UNCERTAIN BEHAVIOR OF SKIN: ICD-10-CM

## 2024-07-09 DIAGNOSIS — M17.0 PRIMARY OSTEOARTHRITIS OF BOTH KNEES: ICD-10-CM

## 2024-07-09 DIAGNOSIS — K21.9 GASTROESOPHAGEAL REFLUX DISEASE WITHOUT ESOPHAGITIS: ICD-10-CM

## 2024-07-09 DIAGNOSIS — R10.11 BILATERAL UPPER ABDOMINAL PAIN: ICD-10-CM

## 2024-07-09 DIAGNOSIS — E78.00 PURE HYPERCHOLESTEROLEMIA: ICD-10-CM

## 2024-07-09 DIAGNOSIS — Z00.00 PE (PHYSICAL EXAM), ROUTINE: Primary | ICD-10-CM

## 2024-07-09 DIAGNOSIS — Z85.3 HISTORY OF RIGHT BREAST CANCER: ICD-10-CM

## 2024-07-09 DIAGNOSIS — E53.8 VITAMIN B12 DEFICIENCY: ICD-10-CM

## 2024-07-09 DIAGNOSIS — J45.20 MILD INTERMITTENT ASTHMA WITHOUT COMPLICATION (HCC): ICD-10-CM

## 2024-07-09 DIAGNOSIS — R10.12 BILATERAL UPPER ABDOMINAL PAIN: ICD-10-CM

## 2024-07-09 PROCEDURE — 1159F MED LIST DOCD IN RCRD: CPT | Performed by: INTERNAL MEDICINE

## 2024-07-09 PROCEDURE — 99499 UNLISTED E&M SERVICE: CPT | Performed by: INTERNAL MEDICINE

## 2024-07-09 PROCEDURE — 99213 OFFICE O/P EST LOW 20 MIN: CPT | Performed by: INTERNAL MEDICINE

## 2024-07-09 PROCEDURE — 1170F FXNL STATUS ASSESSED: CPT | Performed by: INTERNAL MEDICINE

## 2024-07-09 PROCEDURE — 3075F SYST BP GE 130 - 139MM HG: CPT | Performed by: INTERNAL MEDICINE

## 2024-07-09 PROCEDURE — 96160 PT-FOCUSED HLTH RISK ASSMT: CPT | Performed by: INTERNAL MEDICINE

## 2024-07-09 PROCEDURE — 1160F RVW MEDS BY RX/DR IN RCRD: CPT | Performed by: INTERNAL MEDICINE

## 2024-07-09 PROCEDURE — 3008F BODY MASS INDEX DOCD: CPT | Performed by: INTERNAL MEDICINE

## 2024-07-09 PROCEDURE — 3079F DIAST BP 80-89 MM HG: CPT | Performed by: INTERNAL MEDICINE

## 2024-07-09 PROCEDURE — 1126F AMNT PAIN NOTED NONE PRSNT: CPT | Performed by: INTERNAL MEDICINE

## 2024-07-09 PROCEDURE — G0439 PPPS, SUBSEQ VISIT: HCPCS | Performed by: INTERNAL MEDICINE

## 2024-07-09 NOTE — PROGRESS NOTES
HPI:   Maria Esther Foster is a 69 year old female who presents for a Medicare Subsequent Annual Wellness visit (Pt already had Initial Annual Wellness).    Patient presents today for  Medicare physical exam,  Patient states she has been doing well she is taking pantoprazole which daily for heartburn send she feels well.  Patient taking montelukast that helps her also for the allergies and nasal congestion taking loratadine   Takes melatonin occasionally for the sleep and that is helping her.    Had  few  times  upper  abdominal pain after eating certain food patient stated 1 time happened after eating ice cream to other times happen when she was eating certain food 15 minutes after she developed some tightening in the right upper abdominal area but she did not have any nausea vomiting or diarrhea constipation denies any blood in stool.  Patient said this did spontaneously go away after 15 to 20 minutes.  Patient said this happened couple times and now she is okay today she does not feel any pain.   states doing well otherwise, denies chest pain, shortness of breath, dyspnea on exertion or heart palpitations also denies nausea, vomiting, diarrhea, constipation No fever, chills, or UTI symptoms.   The rest of the review of systems, see below.         Her last annual assessment has been over 1 year: Annual Physical due on 07/12/2020            She had a completely normal cognitive assessment - see flowsheet entries    Maria Esther Foster has some abnormal functions as listed below:  She has Vision problems based on screening of functional status.       Depression Screening (PHQ-2/PHQ-9): Over the LAST 2 WEEKS   Little interest or pleasure in doing things: Not at all  Feeling down, depressed, or hopeless: Not at all  PHQ-2 SCORE: 0      Advanced Directive:  She does NOT have a Living Will on file in [facility].   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 No inducible SVT after ablation on isoproterenol     Her dual chamber pacemaker was interrogated and programmed pre and post ablation    No immediate complications    Plan  Allow home later today  Resume rivaroxaban and usual medications      Jarocho Schaffer MD  Attending Cardiac Electrophysiologist Face with patient and Family/surrogate (if present), and forms available to patient in AVS     She does NOT have a Power of  for Health Care on file in [facility].   Advance care planning including the explanation and discussion of advance directives standard forms performed Face to Face with patient and Family/surrogate (if present), and forms available to patient in AVS       She smoked tobacco in the past but quit greater than 12 months ago.  Social History     Tobacco Use   Smoking Status Former    Current packs/day: 0.00    Average packs/day: 1 pack/day for 10.0 years (10.0 ttl pk-yrs)    Types: Cigarettes    Start date: 1971    Quit date: 1981    Years since quittin.5   Smokeless Tobacco Never          CAGE Alcohol screening   Maria Esther Foster was screened for Alcohol abuse and had a score of 0 so is at low risk.    Patient Care Team: Patient Care Team:  Wes Marinelli MD as PCP - General (Internal Medicine)  Chiara Oconnor MD (OBSTETRICS & GYNECOLOGY)  Denise Duron MD (DERMATOLOGY)  Wes Marinelli MD as PCP (Internal Medicine)  Teresa Salcido DO (Physical Medicine)  González Rogers MD (Hematology and Oncology)  Audrey Jesus, TISHA (Nurse Practitioner)    Patient Active Problem List   Diagnosis    Personal history of malignant neoplasm of breast    Malignant neoplasm of right breast in female, estrogen receptor negative (HCC)    Personal history of skin cancer    Inflamed seborrheic keratosis    Mild intermittent asthma without complication (HCC)    Pure hypercholesterolemia    Actinic keratosis    Sleep disturbance    Primary osteoarthritis of both knees    Senile osteoporosis    Epidermal cyst    Encounter for follow-up surveillance of breast cancer    Neoplasm of uncertain behavior of skin    History of actinic keratoses    Lateral epicondylitis of left elbow    Vitamin B12 deficiency     Wt Readings from Last 3 Encounters:   24 176 lb (79.8 kg)   24 177 lb (80.3 kg)    10/19/23 177 lb 9.6 oz (80.6 kg)      Last Cholesterol Labs:   Lab Results   Component Value Date    CHOLEST 210 (H) 09/29/2023    HDL 83 (H) 09/29/2023     (H) 09/29/2023    TRIG 56 09/29/2023          Last Chemistry Labs:   Lab Results   Component Value Date    AST 24 02/21/2024    ALT 18 02/21/2024    CA 9.7 02/21/2024    ALB 4.4 02/21/2024    TSH 1.300 09/29/2023    CREATSERUM 0.79 02/21/2024    GLU 90 02/21/2024        CBC  (most recent labs)   Lab Results   Component Value Date    WBC 5.3 09/29/2023    HGB 12.4 09/29/2023    .0 09/29/2023        ALLERGIES:   She is allergic to perfumes, other, adhesive tape, cephalosporins, hydrocodone, duricef [cefadroxil], estradiol, methocarbamol, potato, and sulfanilamide.    CURRENT MEDICATIONS:   Outpatient Medications Marked as Taking for the 7/9/24 encounter (Office Visit) with Wes Marinelli MD   Medication Sig    Loratadine (CLARITIN OR) Take 1 tablet by mouth daily.    montelukast 10 MG Oral Tab Take 1 tablet (10 mg total) by mouth nightly.    pantoprazole 40 MG Oral Tab EC TAKE 1 TABLET BY MOUTH IN  THE MORNING BEFORE  BREAKFAST    albuterol (PROAIR HFA) 108 (90 Base) MCG/ACT Inhalation Aero Soln Inhale 2 puffs into the lungs every 4 (four) hours as needed for Wheezing or Shortness of Breath (cough).    Estradiol 10 MCG Vaginal Tab Take 1 tablet twice a week    clobetasol 0.05 % External Ointment Apply 1 Application. topically As Directed. Apply thin layer to affected area twice a day for a week, then once a day for a week, then once every other day for a week    Calcium Citrate-Vitamin D 315-250 MG-UNIT Oral Tab Take 1 tablet by mouth in the morning and 1 tablet before bedtime.    cyanocobalamine 1000 MCG Oral Tab Take 1 tablet (1,000 mcg total) by mouth daily.    SALINE MIST SPRAY NA 2 sprays by Each Nare route 2 (two) times a day.    triamcinolone acetonide 0.1 % Mouth/Throat Paste Apply locally On canker sores 2-3 times daily for 3-4 days -  as needed    Melatonin 5 MG Oral Tab Take by mouth nightly.    PEG 3350 17 g Oral Powd Pack Take by mouth. Take every 2-3 days    Vitamin D3 (VITAMIN D3) 2000 UNITS Oral Cap Take 1,000 Units by mouth daily.    Olopatadine HCl 0.2 % Ophthalmic Solution 1 drop by Each eye route daily.        MEDICAL INFORMATION:   She  has a past medical history of Allergic rhinitis, Asthma (), Basal cell carcinoma (), Basal cell carcinoma of chin (2020), BCC (basal cell carcinoma) (2020), Breast cancer (HCC) (2007), Cancer (HCC) (), Cataract, Chickenpox, GERD (gastroesophageal reflux disease), Measles, Migraines, Mumps, Osteoarthritis, and Visual impairment.    She  has a past surgical history that includes biopsy of breast, needle core (Right, 2007); ; colonoscopy (); colonoscopy (N/A, 2019); radiation right (Right, ); lumpectomy right (Right, ); and chemotherapy (Right, ).    Her family history includes Alcohol and Other Disorders Associated in an other family member; Asthma in her mother; Breast Cancer (age of onset: 53) in her self; Breast Cancer (age of onset: 74) in her mother; Cancer in her mother and sister; Dementia in her father; Heart Attack in her father; Heart Disease in her father; Lipids in her brother and father; Other in her mother; Skin cancer in her sister; Stroke in her father; Thyroid disease in her mother; copd in her mother.   SOCIAL HISTORY:   She  reports that she quit smoking about 43 years ago. Her smoking use included cigarettes. She started smoking about 53 years ago. She has a 10 pack-year smoking history. She has never used smokeless tobacco. She reports current alcohol use. She reports that she does not use drugs.     REVIEW OF SYSTEMS:   Review of Systems   Gastrointestinal:         Patient states she felt some tightness in the upper abdomen after eating certain food that happens a couple times so far developed some gassiness and  bloatedness +Satya   tightness  in  abdomen - after eating  ice cream   or other  foods  15  min after  eating       GENERAL: feels well otherwise  SKIN: denies any unusual skin lesions  EYES: denies blurred vision or double vision  HEENT: denies nasal congestion, sinus pain or ST  LUNGS: denies shortness of breath with exertion  CARDIOVASCULAR: denies chest pain on exertion  GI: denies abdominal pain -today see HPI, heartburn-stable on PPI  : denies dysuria, vaginal discharge or itching, no complaint of urinary incontinence   MUSCULOSKELETAL: denies back pain  NEURO: denies headaches  PSYCHE: denies depression or anxiety  HEMATOLOGIC: denies hx of anemia  ENDOCRINE: denies thyroid history  ALL/ASTHMA: denies hx of allergy or asthma   EXAM:   /85 (BP Location: Left arm, Patient Position: Sitting, Cuff Size: large)   Pulse 69   Ht 5' 2.99\" (1.6 m)   Wt 176 lb (79.8 kg)   BMI 31.19 kg/m²  Estimated body mass index is 31.19 kg/m² as calculated from the following:    Height as of this encounter: 5' 2.99\" (1.6 m).    Weight as of this encounter: 176 lb (79.8 kg).    Medicare Hearing Assessment  (Required for AWV/SWV)    Hearing Screening    Time taken: 7/9/2024  8:46 AM  Entry User: Zan Morin LPN  Screening Method: Finger Rub  Finger Rub Result: Pass              Visual Acuity  Right Eye Visual Acuity: Corrected Right Eye Chart Acuity: 20/50   Left Eye Visual Acuity: Corrected Left Eye Chart Acuity: 20/40   Both Eyes Visual Acuity: Corrected Both Eyes Chart Acuity: 20/30          Physical Exam   GENERAL: well developed, well nourished,in no apparent distress  HEENT: atraumatic, normocephalic ,external ears and canals normal,TM normal mouth covered by surgical mask due to COVID-19 pandemic    EYES:PERRLA, EOMI, - normal ,conjunctiva are clear   Heent -oral cavity normal ,no abnormality seen  NECK: supple,no adenopathy,no bruits, no thyromegaly and no JVD  LUNGS: clear to auscultation bilaterally no wheezing    CARDIO:S1,S2 normal , RRR  , no murmur   GI: present BS's in all quadrants  , no tenderness or distension no mass, no HSM   MUSCULOSKELETAL: Rom good  , no joints swelling   EXTREMITIES: no cyanosis, clubbing or edema  SKIN :no rash - surgical  incision from basal cell cancer removed  NEURO: Oriented times three,cranial nerves are intact,motor and sensory are grossly intact  PSYCHIATRIC - no  Anxiety or depression symptoms     Vaccination History     Immunization History   Administered Date(s) Administered    Covid-19 Vaccine Pfizer 30 mcg/0.3 ml 02/23/2021, 03/16/2021, 09/27/2021, 07/01/2022    Covid-19 Vaccine Pfizer Bivalent 30mcg/0.3mL 10/25/2022, 07/18/2023    FLU VAC High Dose 65 YRS & Older PRSV Free (61469) 10/06/2020, 10/13/2022, 10/25/2023    FLU VAC QIV SPLIT 3 YRS AND OLDER (51283) 10/31/2015    FLUAD High Dose 65 yr and older (19931) 10/09/2019    FLUZONE 6 months and older PFS 0.5 ml (80446) 10/27/2016, 09/23/2017, 02/15/2019    Fluarix 6 Months And Older 0.5 ml prefilled syringe (59171) 02/15/2019    Fluzone Vaccine Medicare () 10/06/2020    Influenza 11/14/2006, 10/11/2007, 11/01/2015, 10/07/2019, 10/12/2021, 10/25/2023    Influenza Vaccine, Preserv Free 11/17/2010    Moderna Covid-19 Vaccine 50mcg/0.5ml 12yrs+ (4013-4626) 03/29/2024    Pneumococcal (Prevnar 13) 07/12/2019    Pneumovax 23 05/12/2021    RSV, recombinant, RSVpreF, adjuvanted (Arexvy) 09/13/2023    TD 07/26/2007    TDAP 02/08/2019    Zoster Vaccine Live (Zostavax) 06/20/2014    Zoster Vaccine Recombinant Adjuvanted (Shingrix) 07/28/2021, 12/01/2021      Blood pressure 139/85, pulse 69, height 5' 2.99\" (1.6 m), weight 176 lb (79.8 kg).      ASSESSMENT AND OTHER RELEVANT CHRONIC CONDITIONS:   Maria Esther Foster is a 69 year old female who presents for a Medicare Assessment.     PLAN SUMMARY:   Diagnoses and all orders for this visit:  Medicare annual wellness visit, subsequent  Maintain a healthy diet , low saturated fat and low  sugar diet  Keep good hydration  Maintain a regular activity /walking as tolerated   Complete labs as ordered,     colonoscopy utd f/u in - 12/29   Mammogram - per  Dr Garcia   Preventative health maintenance tests reviewed  Mammogram - utd   In  6/23  With Dr Garcia ,    dexa  Scan  23  - utd   patient is on Prolia-  F/u  With  Endo -  Dr Heladio Huerta - prolia injection every 6 months  --  will start Reclast inj  Immunizations reviewed   utd  Need  2nd covid 19  Shot , flu shot in 10/23   Advanced  Directives   -discussed with patient   POA  -is her   - will bring the form signed   Patient agrees with the plan verbalized understanding and compliance       Pure hypercholesterolemia  Keep low saturated fat  diet   Avoid red meat and fast/fried food  fruits and vegetables   Keep active /walking ,exercise as  tolerated  Reach ideal weight   Continue present management   Labs to complete   Keep with low fat diet  Labs  - monitor      Atrophic  vaginitis   Seeing gyne   Using vaginal - lubricants  Estogel tablets         Allergic rhynitis  Zyrtec every day as needed       Mild intermittent asthma without complication  Stable cpm    proair  Inh  2 puff - As needed         Sleep disturbance  occasionally -per patient can try melatonin over-the-counter 5-10  Mg  As needed   Stable  cpm     Primary osteoarthritis of both knees  Doing  exercise   Stable  tylenol  500 mg  Every day as needed       cpm    Malignant neoplasm of upper-outer quadrant of right breast in female, estrogen receptor negative (HCC)  2007  Surgery  lymp and lymph node   Had Chemo - 4 Months    2007 and 2008   And  Rtg th    For  6  Weeks   2008 completed   F/u Mammogram in 6/23   Dr Garcia     Stable cpm   F/u  Oncology  regularly     Age-related osteoporosis without current pathological fracture  Dexa  Scan   Discussed    prolia  q   6  months  -- >  Recast will  start    Calcium  600 mg every day  bid   Vitamin D3 400 U bid   Vit D3 -  2000 u every day       History of the basals cell cancer chin area  Stable   Stable patient seeing dermatology      Cold sores   occasionally   No sy now pt like to have script   Local th  Paste -as needed       - CBC With Differential With Platelet  - Comp Metabolic Panel (14)  - TSH W Reflex To Free T4  - Lipid Panel  - Urinalysis with Culture Reflex         Primary osteoarthritis of both knees  Stable   cpm  tylenol   500 mg as needed       Hx  r  breast  ca patient sees Dr. Diana regularly   Mammograms  per Dr. Garcia      Gastroesophageal reflux disease without esophagitis     Patient advised to avoid spicy food, coffee, tea, caffeinated drinks, alcohol, acidic food/juices  Advised to avoid NSAID's - Aspirin-based medications  Advised to avoid wearing  tight clothes   Advised to elevate the head of the bed   Avoid eating at least 3 hours before bedtime   Counseling on ideal weight/BMI  Take pantoprazole 40 mg PPIs qd in the morning 30-60 minutes before breakfast always on empty stomach Side effects and directions of medication discussed with patient. Patient verbalized understanding and compliance.          Bilateral upper abdominal pain   Avoid daily food   Avoid big meals   Ultrasound abdomen to complete  - US ABDOMEN COMPLETE (CPT=76700); Future  Fluids    Pt  education       Diet assessment: good     PLAN:  The patient indicates understanding of these issues and agrees to the plan.  Reinforced healthy diet, lifestyle, and exercise.    No follow-ups on file.     Wes Marinelli MD, 3/2/2021     General Health     In the past six months, have you lost more than 10 pounds without trying?: 2 - No  Has your appetite been poor?: No  How does the patient maintain a good energy level?: Appropriate Exercise  How would you describe your daily physical activity?: Moderate  How would you describe your current health state?: Good  How do you maintain positive mental well-being?: Puzzles;Games;Visiting Family      This  section provided for quick review of chart, separate sheet to patient  PREVENTATIVE SERVICES  INDICATIONS AND SCHEDULE Internal Lab or Procedure External Lab or Procedure   Diabetes Screening      HbgA1C   Annually No results found for: \"A1C\"      No data to display                Fasting Blood Sugar (FSB)Annually Glucose (mg/dL)   Date Value   02/21/2024 90          Cardiovascular Disease Screening     LDL Annually LDL Cholesterol (mg/dL)   Date Value   09/29/2023 117 (H)        EKG - w/ Initial Preventative Physical Exam only, or if medically necessary Electrocardiogram date     Colorectal Cancer Screening      Colonoscopy Screen every 10 years Health Maintenance   Topic Date Due    Colorectal Cancer Screening  12/19/2029    Update Health Maintenance if applicable    Flex Sigmoidoscopy Screen every 10 years No results found for this or any previous visit.      No data to display                 Fecal Occult Blood Annually Occult Blood Result (no units)   Date Value   09/22/2021 Negative         No data to display                Glaucoma Screening      Ophthalmology Visit Annually: Diabetics, FHx Glaucoma, AA>50, > 65      No data to display                Bone Density Screening      Dexascan Every two years Last Dexa Scan:    XR DEXA BONE DENSITOMETRY (CPT=77080) 10/12/2023        No data to display                Pap and Pelvic      Pap: Every 3 yrs age 21-65 or Pap+HPV every 5 yrs age 30-65, age 65 and older at high risk No recommendations at this time Update Health Maintenance if applicable    Chlamydia  Annually if high risk No results found for: \"CHLAMYDIA\"      No data to display                Screening Mammogram      Mammogram  Annually to 75, then as discussed Health Maintenance   Topic Date Due    Mammogram  07/01/2025    Update Health Maintenance if applicable     Immunizations (Update Immunization Activity if applicable)     Influenza  Covered Annually 10/25/2023 Please get every year     Pneumococcal 13 (Prevnar)  Covered Once after 65 07/12/2019 Please get once after your 65th birthday    Pneumococcal 23 (Pneumovax)  Covered Once after 65 No vaccine history found Please get once after your 65th birthday    Hepatitis B for Moderate/High Risk No vaccine history found Medium/high risk factors:   End-stage renal disease   Hemophiliacs who received Factor VIII or IX concentrates   Clients of institutions for the mentally retarded   Persons who live in the same house as a HepB virus carrier   Homosexual men   Illicit injectable drug abusers     Tetanus Toxoid  Only covered with a cut with metal- TD and TDaP Not covered by Medicare Part B) 07/26/2007 This may be covered with your prescription benefits, but Medicare does not cover unless Medically needed    Zoster   Not covered by Medicare Part B No vaccine history found This may be covered with your pharmacy  prescription benefits                        Template: GRAEME CHAO MEDICARE ANNUAL ASSESSMENT FEMALE NOTEWRITER [[id number]]

## 2024-07-12 ENCOUNTER — LAB ENCOUNTER (OUTPATIENT)
Dept: LAB | Age: 69
End: 2024-07-12
Attending: INTERNAL MEDICINE
Payer: MEDICARE

## 2024-07-12 DIAGNOSIS — M81.0 SENILE OSTEOPOROSIS: ICD-10-CM

## 2024-07-12 LAB
ALBUMIN SERPL-MCNC: 4.6 G/DL (ref 3.2–4.8)
ALBUMIN/GLOB SERPL: 1.7 {RATIO} (ref 1–2)
ALP LIVER SERPL-CCNC: 63 U/L
ALT SERPL-CCNC: 14 U/L
ANION GAP SERPL CALC-SCNC: 7 MMOL/L (ref 0–18)
AST SERPL-CCNC: 20 U/L (ref ?–34)
BASOPHILS # BLD AUTO: 0.05 X10(3) UL (ref 0–0.2)
BASOPHILS NFR BLD AUTO: 0.9 %
BILIRUB SERPL-MCNC: 0.6 MG/DL (ref 0.2–1.1)
BILIRUB UR QL: NEGATIVE
BUN BLD-MCNC: 14 MG/DL (ref 9–23)
BUN/CREAT SERPL: 15.7 (ref 10–20)
CALCIUM BLD-MCNC: 9.5 MG/DL (ref 8.7–10.4)
CHLORIDE SERPL-SCNC: 107 MMOL/L (ref 98–112)
CHOLEST SERPL-MCNC: 209 MG/DL (ref ?–200)
CLARITY UR: CLEAR
CO2 SERPL-SCNC: 27 MMOL/L (ref 21–32)
COLOR UR: COLORLESS
CREAT BLD-MCNC: 0.89 MG/DL
DEPRECATED RDW RBC AUTO: 44.5 FL (ref 35.1–46.3)
EGFRCR SERPLBLD CKD-EPI 2021: 70 ML/MIN/1.73M2 (ref 60–?)
EOSINOPHIL # BLD AUTO: 0.18 X10(3) UL (ref 0–0.7)
EOSINOPHIL NFR BLD AUTO: 3.3 %
ERYTHROCYTE [DISTWIDTH] IN BLOOD BY AUTOMATED COUNT: 12.9 % (ref 11–15)
FASTING PATIENT LIPID ANSWER: YES
FASTING STATUS PATIENT QL REPORTED: YES
GLOBULIN PLAS-MCNC: 2.7 G/DL (ref 2–3.5)
GLUCOSE BLD-MCNC: 96 MG/DL (ref 70–99)
GLUCOSE UR-MCNC: NORMAL MG/DL
HCT VFR BLD AUTO: 39.4 %
HDLC SERPL-MCNC: 77 MG/DL (ref 40–59)
HGB BLD-MCNC: 12.9 G/DL
HGB UR QL STRIP.AUTO: NEGATIVE
IMM GRANULOCYTES # BLD AUTO: 0.01 X10(3) UL (ref 0–1)
IMM GRANULOCYTES NFR BLD: 0.2 %
KETONES UR-MCNC: NEGATIVE MG/DL
LDLC SERPL CALC-MCNC: 120 MG/DL (ref ?–100)
LEUKOCYTE ESTERASE UR QL STRIP.AUTO: NEGATIVE
LYMPHOCYTES # BLD AUTO: 1.79 X10(3) UL (ref 1–4)
LYMPHOCYTES NFR BLD AUTO: 32.4 %
MCH RBC QN AUTO: 30.8 PG (ref 26–34)
MCHC RBC AUTO-ENTMCNC: 32.7 G/DL (ref 31–37)
MCV RBC AUTO: 94 FL
MONOCYTES # BLD AUTO: 0.57 X10(3) UL (ref 0.1–1)
MONOCYTES NFR BLD AUTO: 10.3 %
NEUTROPHILS # BLD AUTO: 2.93 X10 (3) UL (ref 1.5–7.7)
NEUTROPHILS # BLD AUTO: 2.93 X10(3) UL (ref 1.5–7.7)
NEUTROPHILS NFR BLD AUTO: 52.9 %
NITRITE UR QL STRIP.AUTO: NEGATIVE
NONHDLC SERPL-MCNC: 132 MG/DL (ref ?–130)
OSMOLALITY SERPL CALC.SUM OF ELEC: 292 MOSM/KG (ref 275–295)
PH UR: 6.5 [PH] (ref 5–8)
PLATELET # BLD AUTO: 225 10(3)UL (ref 150–450)
POTASSIUM SERPL-SCNC: 4.2 MMOL/L (ref 3.5–5.1)
PROT SERPL-MCNC: 7.3 G/DL (ref 5.7–8.2)
PROT UR-MCNC: NEGATIVE MG/DL
RBC # BLD AUTO: 4.19 X10(6)UL
SODIUM SERPL-SCNC: 141 MMOL/L (ref 136–145)
SP GR UR STRIP: 1 (ref 1–1.03)
TRIGL SERPL-MCNC: 66 MG/DL (ref 30–149)
TSI SER-ACNC: 1.57 MIU/ML (ref 0.55–4.78)
UROBILINOGEN UR STRIP-ACNC: NORMAL
VLDLC SERPL CALC-MCNC: 12 MG/DL (ref 0–30)
WBC # BLD AUTO: 5.5 X10(3) UL (ref 4–11)

## 2024-07-12 PROCEDURE — 80061 LIPID PANEL: CPT | Performed by: INTERNAL MEDICINE

## 2024-07-12 PROCEDURE — 36415 COLL VENOUS BLD VENIPUNCTURE: CPT | Performed by: INTERNAL MEDICINE

## 2024-07-12 PROCEDURE — 84443 ASSAY THYROID STIM HORMONE: CPT | Performed by: INTERNAL MEDICINE

## 2024-07-12 PROCEDURE — 81003 URINALYSIS AUTO W/O SCOPE: CPT | Performed by: INTERNAL MEDICINE

## 2024-07-12 PROCEDURE — 85025 COMPLETE CBC W/AUTO DIFF WBC: CPT | Performed by: INTERNAL MEDICINE

## 2024-07-12 PROCEDURE — 80053 COMPREHEN METABOLIC PANEL: CPT | Performed by: INTERNAL MEDICINE

## 2024-07-25 ENCOUNTER — OFFICE VISIT (OUTPATIENT)
Dept: DERMATOLOGY CLINIC | Facility: CLINIC | Age: 69
End: 2024-07-25
Payer: COMMERCIAL

## 2024-07-25 ENCOUNTER — OFFICE VISIT (OUTPATIENT)
Dept: HEMATOLOGY/ONCOLOGY | Facility: HOSPITAL | Age: 69
End: 2024-07-25
Attending: INTERNAL MEDICINE
Payer: MEDICARE

## 2024-07-25 VITALS
WEIGHT: 178.38 LBS | HEART RATE: 75 BPM | RESPIRATION RATE: 18 BRPM | DIASTOLIC BLOOD PRESSURE: 57 MMHG | OXYGEN SATURATION: 98 % | SYSTOLIC BLOOD PRESSURE: 136 MMHG | TEMPERATURE: 98 F | HEIGHT: 63 IN | BODY MASS INDEX: 31.61 KG/M2

## 2024-07-25 DIAGNOSIS — Z87.2 HISTORY OF ACTINIC KERATOSES: ICD-10-CM

## 2024-07-25 DIAGNOSIS — Z08 ENCOUNTER FOR FOLLOW-UP SURVEILLANCE OF SKIN CANCER: ICD-10-CM

## 2024-07-25 DIAGNOSIS — D22.9 MULTIPLE NEVI: ICD-10-CM

## 2024-07-25 DIAGNOSIS — L72.3 INFLAMED EPIDERMOID CYST OF SKIN: Primary | ICD-10-CM

## 2024-07-25 DIAGNOSIS — Z12.31 SCREENING MAMMOGRAM FOR BREAST CANCER: ICD-10-CM

## 2024-07-25 DIAGNOSIS — L72.0 EPIDERMAL CYST: ICD-10-CM

## 2024-07-25 DIAGNOSIS — L81.4 SOLAR LENTIGO: ICD-10-CM

## 2024-07-25 DIAGNOSIS — Z08 ENCOUNTER FOR FOLLOW-UP SURVEILLANCE OF BREAST CANCER: ICD-10-CM

## 2024-07-25 DIAGNOSIS — L82.1 SEBORRHEIC KERATOSES: ICD-10-CM

## 2024-07-25 DIAGNOSIS — Z85.3 HISTORY OF RIGHT BREAST CANCER: Primary | ICD-10-CM

## 2024-07-25 DIAGNOSIS — Z85.828 ENCOUNTER FOR FOLLOW-UP SURVEILLANCE OF SKIN CANCER: ICD-10-CM

## 2024-07-25 DIAGNOSIS — Z85.3 ENCOUNTER FOR FOLLOW-UP SURVEILLANCE OF BREAST CANCER: ICD-10-CM

## 2024-07-25 PROCEDURE — 1160F RVW MEDS BY RX/DR IN RCRD: CPT | Performed by: DERMATOLOGY

## 2024-07-25 PROCEDURE — 99213 OFFICE O/P EST LOW 20 MIN: CPT | Performed by: INTERNAL MEDICINE

## 2024-07-25 PROCEDURE — 99213 OFFICE O/P EST LOW 20 MIN: CPT | Performed by: DERMATOLOGY

## 2024-07-25 PROCEDURE — G2211 COMPLEX E/M VISIT ADD ON: HCPCS | Performed by: DERMATOLOGY

## 2024-07-25 PROCEDURE — 1159F MED LIST DOCD IN RCRD: CPT | Performed by: DERMATOLOGY

## 2024-07-25 NOTE — PROGRESS NOTES
HPI   Maria Esther Foster is a 69 year old female here for f/u of   Encounter Diagnoses   Name Primary?    History of right breast cancer Yes    Encounter for follow-up surveillance of breast cancer     Screening mammogram for breast cancer         Denies changes on the breasts.      No swelling of the R arm.  Some soreness of the R axilla on occasion.  Improves with deep massage.     Weight stable.      Going for a walk every morning or biking.        ECOG  PS 0.    Review of Systems:     Review of Systems   Constitutional:  Negative for appetite change, fatigue and unexpected weight change.   Respiratory:  Positive for cough (due to post nasal gtt.). Negative for shortness of breath.    Cardiovascular:  Negative for chest pain.   Gastrointestinal:  Negative for abdominal pain.   Musculoskeletal:  Positive for arthralgias (B knees.) and back pain (if stands too long). Negative for neck pain.        No bone pain.   Neurological:  Negative for dizziness (sometimes with position changes) and headaches.   Hematological:  Negative for adenopathy.   Psychiatric/Behavioral:  Negative for sleep disturbance.            Current Outpatient Medications   Medication Sig Dispense Refill    Loratadine (CLARITIN OR) Take 10 mg by mouth daily.      montelukast 10 MG Oral Tab Take 1 tablet (10 mg total) by mouth nightly. 90 tablet 3    pantoprazole 40 MG Oral Tab EC TAKE 1 TABLET BY MOUTH IN  THE MORNING BEFORE  BREAKFAST 90 tablet 3    albuterol (PROAIR HFA) 108 (90 Base) MCG/ACT Inhalation Aero Soln Inhale 2 puffs into the lungs every 4 (four) hours as needed for Wheezing or Shortness of Breath (cough). 3 each 3    Estradiol 10 MCG Vaginal Tab Take 1 tablet twice a week      clobetasol 0.05 % External Ointment Apply 1 Application. topically As Directed. Apply thin layer to affected area twice a day for a week, then once a day for a week, then once every other day for a week 30 g 1    Calcium Citrate-Vitamin D 315-250 MG-UNIT Oral Tab  Take 1 tablet by mouth in the morning and 1 tablet before bedtime.      cyanocobalamine 1000 MCG Oral Tab Take 1 tablet (1,000 mcg total) by mouth daily.      SALINE MIST SPRAY NA 2 sprays by Each Nare route 2 (two) times a day.      triamcinolone acetonide 0.1 % Mouth/Throat Paste Apply locally On canker sores 2-3 times daily for 3-4 days - as needed 1 each 0    Melatonin 5 MG Oral Tab Take by mouth nightly.      Diclofenac Sodium 1 % External Gel Apply 1 g topically in the morning and 1 g before bedtime.      PEG 3350 17 g Oral Powd Pack Take by mouth. Take every 2-3 days      Vitamin D3 (VITAMIN D3) 2000 UNITS Oral Cap Take 1,000 Units by mouth daily.      Olopatadine HCl 0.2 % Ophthalmic Solution 1 drop by Each eye route daily.         Allergies:   Allergies   Allergen Reactions    Perfumes ASTHMA     Floral scents and pine,and stargazer lillies    Other RASH and FACE FLUSHING     Dial soap  Scented detergents    Adhesive Tape RASH    Cephalosporins ITCHING    Hydrocodone ITCHING    Duricef [Cefadroxil] ITCHING    Estradiol ITCHING    Methocarbamol RASH     Other name robaxin    Potato DIARRHEA     Potato skins    Sulfanilamide RASH       Past Medical History:    Allergic rhinitis    Asthma (HCC)    Basal cell carcinoma    excision- back    Basal cell carcinoma of chin    Wide excision lesion to Right chin BCC / VY flap    BCC (basal cell carcinoma)    Right chin    Breast cancer (HCC)    9/19/07(right breast lumpectomy with sentinel LN biopsy) 2.0 cm infiltrating poorly differentiated ductal     Cancer (HCC)    breast cancer and basal cell carcinoma    Cataract    Chickenpox    GERD (gastroesophageal reflux disease)    Measles    Migraines    occular migraines    Mumps    Osteoarthritis    Visual impairment    wears glasses     Past Surgical History:   Procedure Laterality Date    Biopsy of breast, needle core Right 09/11/2007 9/11/07 (core biopsy) Poorly differentiated infiltrating ductal carcinoma R breast  (triple negative, Ki   67,  86%)           Chemotherapy Right 2008    right breast    Colonoscopy      with biopsy    Colonoscopy N/A 2019    Procedure: COLONOSCOPY;  Surgeon: Chauncey Melissa MD;  Location: [facility] ENDOSCOPY    Lumpectomy right Right 2007    and multiple sentinel lymph node excisions    Radiation right Right 2008    right breast     Social History     Socioeconomic History    Marital status:    Tobacco Use    Smoking status: Former     Current packs/day: 0.00     Average packs/day: 1 pack/day for 10.0 years (10.0 ttl pk-yrs)     Types: Cigarettes     Start date: 1971     Quit date: 1981     Years since quittin.5    Smokeless tobacco: Never   Vaping Use    Vaping status: Never Used   Substance and Sexual Activity    Alcohol use: Yes     Alcohol/week: 0.0 standard drinks of alcohol     Comment: social    Drug use: No     Comment: NONE    Sexual activity: Yes   Other Topics Concern    Caffeine Concern Yes     Comment: chocolate--Per NG no    Exercise No    History of tanning No    Pt has a pacemaker No    Pt has a defibrillator No    Reaction to local anesthetic No    Right Handed Yes    Currently spends a great deal of time in the sun No    Hx of Spending Great Deal of Time in Sun Yes    Bad sunburns in the past Yes    Tanning Salons in the Past No    Hx of Radiation Treatments Yes         Family History   Problem Relation Age of Onset    Lipids Father         hyperlipideia    Heart Disease Father         CAD    Stroke Father         TIAs    Dementia Father     Heart Attack Father         myocardial infarction    Thyroid disease Mother     Asthma Mother     Breast Cancer Mother 74    Cancer Mother     Other (Other) Mother     Other (copd) Mother         cause of death- passed at age 86    Alcohol and Other Disorders Associated Other         alcoholism grandmother    Skin cancer Sister         Basal cell carcinoma-chest    Cancer Sister     Breast Cancer Self 53     Lipids Brother     Ovarian Cancer Neg          PHYSICAL EXAM:    /57 (BP Location: Left arm, Patient Position: Sitting, Cuff Size: adult)   Pulse 75   Temp 98.1 °F (36.7 °C) (Oral)   Resp 18   Ht 1.6 m (5' 3\")   Wt 80.9 kg (178 lb 6.4 oz)   SpO2 98%   BMI 31.60 kg/m²   Wt Readings from Last 6 Encounters:   07/25/24 80.9 kg (178 lb 6.4 oz)   07/09/24 79.8 kg (176 lb)   02/26/24 80.3 kg (177 lb)   10/19/23 80.6 kg (177 lb 9.6 oz)   08/21/23 80.3 kg (177 lb)   07/24/23 80.3 kg (177 lb)     General: Patient is alert, not in acute distress.  HEENT: EOMs intact. PERRL.   Neck: No JVD. No palpable lymphadenopathy. Neck is supple.  Chest: Clear to auscultation.  Breasts: R breast with surgical changes, no masses.  L breast no masses.   Heart: Regular rate and rhythm.   Abdomen: Soft, non tender with good bowel sounds.  Extremities: No edema.  Neurological: Grossly intact.   Lymphatics: There is no palpable lymphadenopathy throughout in the cervical, supraclavicular, axillary, or inguinal regions.  Psych/Depression: nl        ASSESSMENT/PLAN:     Encounter Diagnoses   Name Primary?    History of right breast cancer Yes    Encounter for follow-up surveillance of breast cancer     Screening mammogram for breast cancer         Tomosynthesis on July 1, 2024 was BIRADS-2.  Due in 1 year, order given.    Monthly SBE.    Yearly H+P and yearly mammogram.    F/u with PCP yearly.    No orders of the defined types were placed in this encounter.      Results From Past 48 Hours:  No results found for this or any previous visit (from the past 48 hour(s)).    MDM low.    Imaging & Referrals:  None   No orders of the defined types were placed in this encounter.    Narrative  PROCEDURE: [facility] KULWANT 2D+3D SCREENING BILAT ([id number])     COMPARISON: [facility], [facility] KULWANT 2D+3D SCREENING BILAT ([id number]), 6/17/2020, 12:24 PM.  [facility], [facility] KULWANT 2D+3D SCREENING BILAT  ([id number]), 6/18/2021, 2:30 PM.  [facility], [facility] KULWANT 2D+3D SCREENING BILAT ([id number]), 6/20/2022, 8:45 AM.  [facility], [facility] KULWANT 2D+3D SCREENING BILAT ([id number]), 6/27/2023, 3:14 PM.     INDICATIONS: Z12.31 Screening mammogram for breast cancer     TECHNIQUE: Full field direct screening mammography was performed and images were reviewed with the [employer] NATHAN 1.5.1.5 CAD device.  3D tomosynthesis was performed and reviewed        BREAST COMPOSITION:   Category c-Heterogeneously dense, which may obscure small masses.        FINDINGS: Surgical change in seen in the right breast.  There is no suspicious asymmetry, mass, architectural distortion, or microcalcifications identified in either breast.                   Impression  CONCLUSION:   There is no mammographic evidence of malignancy in either breast. As long as patient's clinical breast exam remains unchanged, annual screening mammogram is recommended.     BI-RADS CATEGORY:    DIAGNOSTIC CATEGORY 2--BENIGN FINDING:       RECOMMENDATIONS:  ROUTINE MAMMOGRAM AND CLINICAL EVALUATION IN 12 MONTHS.                   PLEASE NOTE: NORMAL MAMMOGRAM DOES NOT EXCLUDE THE POSSIBILITY OF BREAST CANCER.  A CLINICALLY SUSPICIOUS PALPABLE LUMP SHOULD BE BIOPSIED.       For patients over the age of 40, the target due date for the patient's next mammogram has been entered into a reminder system.       Patient received a discharge summary from the technologist after completion of exam.     Breast marker legend used on images    Triangle = Palpable lump  Campo = Skin tag or mole  BB = Nipple  Linear vivek = Scar  Square = Pain           Dictated by (CST): Sawyer Rutledge DO on 7/01/2024 at 5:00 PM      Finalized by (CST): Sawyer Rutledge DO on 7/01/2024 at 5:01 PM

## 2024-08-04 ENCOUNTER — HOSPITAL ENCOUNTER (OUTPATIENT)
Dept: ULTRASOUND IMAGING | Age: 69
Discharge: HOME | End: 2024-08-04
Attending: INTERNAL MEDICINE
Payer: MEDICARE

## 2024-08-04 ENCOUNTER — HOSPITAL ENCOUNTER (OUTPATIENT)
Dept: ULTRASOUND IMAGING | Age: 69
End: 2024-08-04
Attending: INTERNAL MEDICINE
Payer: MEDICARE

## 2024-08-04 DIAGNOSIS — R10.11 BILATERAL UPPER ABDOMINAL PAIN: ICD-10-CM

## 2024-08-04 DIAGNOSIS — R10.12 BILATERAL UPPER ABDOMINAL PAIN: ICD-10-CM

## 2024-08-04 PROCEDURE — 76700 US EXAM ABDOM COMPLETE: CPT | Performed by: INTERNAL MEDICINE

## 2024-08-04 NOTE — PROGRESS NOTES
Maria Esther Foster is a 69 year old female.  HPI:     CC:    Chief Complaint   Patient presents with    Full Skin Exam     LOV 23. Pt with Hx of BCC, present for FBSE. Pt with c/o cyst on upper back, for unknown amount of time. Denies drainage, but admits to soreness.         Allergies:  Perfumes, Other, Adhesive tape, Cephalosporins, Hydrocodone, Duricef [cefadroxil], Estradiol, Methocarbamol, Potato, and Sulfanilamide    HISTORY:    Past Medical History:    Allergic rhinitis    Asthma (HCC)    Basal cell carcinoma    excision- back    Basal cell carcinoma of chin    Wide excision lesion to Right chin BCC / VY flap    BCC (basal cell carcinoma)    Right chin    Breast cancer (HCC)    07(right breast lumpectomy with sentinel LN biopsy) 2.0 cm infiltrating poorly differentiated ductal     Cancer (HCC)    breast cancer and basal cell carcinoma    Cataract    Chickenpox    GERD (gastroesophageal reflux disease)    Measles    Migraines    occular migraines    Mumps    Osteoarthritis    Visual impairment    wears glasses      Past Surgical History:   Procedure Laterality Date    Biopsy of breast, needle core Right 2007 (core biopsy) Poorly differentiated infiltrating ductal carcinoma R breast (triple negative, Ki   67,  86%)           Chemotherapy Right     right breast    Colonoscopy      with biopsy    Colonoscopy N/A 2019    Procedure: COLONOSCOPY;  Surgeon: Chauncey Melissa MD;  Location: [facility] ENDOSCOPY    Lumpectomy right Right     and multiple sentinel lymph node excisions    Radiation right Right 2008    right breast      Family History   Problem Relation Age of Onset    Lipids Father         hyperlipideia    Heart Disease Father         CAD    Stroke Father         TIAs    Dementia Father     Heart Attack Father         myocardial infarction    Thyroid disease Mother     Asthma Mother     Breast Cancer Mother 74    Cancer Mother     Other (Other) Mother     Other  (copd) Mother         cause of death- passed at age 86    Alcohol and Other Disorders Associated Other         alcoholism grandmother    Skin cancer Sister         Basal cell carcinoma-chest    Cancer Sister     Breast Cancer Self 54    Lipids Brother     Ovarian Cancer Neg       Social History     Socioeconomic History    Marital status:    Tobacco Use    Smoking status: Former     Current packs/day: 0.00     Average packs/day: 1 pack/day for 10.0 years (10.0 ttl pk-yrs)     Types: Cigarettes     Start date: 1971     Quit date: 1981     Years since quittin.6    Smokeless tobacco: Never   Vaping Use    Vaping status: Never Used   Substance and Sexual Activity    Alcohol use: Yes     Alcohol/week: 0.0 standard drinks of alcohol     Comment: social    Drug use: No     Comment: NONE    Sexual activity: Yes   Other Topics Concern    Caffeine Concern Yes     Comment: chocolate--Per NG no    Exercise No    History of tanning No    Pt has a pacemaker No    Pt has a defibrillator No    Reaction to local anesthetic No    Right Handed Yes    Currently spends a great deal of time in the sun No    Hx of Spending Great Deal of Time in Sun Yes    Bad sunburns in the past Yes    Tanning Salons in the Past No    Hx of Radiation Treatments Yes        Current Outpatient Medications   Medication Sig Dispense Refill    Loratadine (CLARITIN OR) Take 10 mg by mouth daily.      montelukast 10 MG Oral Tab Take 1 tablet (10 mg total) by mouth nightly. 90 tablet 3    pantoprazole 40 MG Oral Tab EC TAKE 1 TABLET BY MOUTH IN  THE MORNING BEFORE  BREAKFAST 90 tablet 3    albuterol (PROAIR HFA) 108 (90 Base) MCG/ACT Inhalation Aero Soln Inhale 2 puffs into the lungs every 4 (four) hours as needed for Wheezing or Shortness of Breath (cough). 3 each 3    Estradiol 10 MCG Vaginal Tab Take 1 tablet twice a week      clobetasol 0.05 % External Ointment Apply 1 Application. topically As Directed. Apply thin layer to affected area  twice a day for a week, then once a day for a week, then once every other day for a week 30 g 1    Calcium Citrate-Vitamin D 315-250 MG-UNIT Oral Tab Take 1 tablet by mouth in the morning and 1 tablet before bedtime.      cyanocobalamine 1000 MCG Oral Tab Take 1 tablet (1,000 mcg total) by mouth daily.      SALINE MIST SPRAY NA 2 sprays by Each Nare route 2 (two) times a day.      triamcinolone acetonide 0.1 % Mouth/Throat Paste Apply locally On canker sores 2-3 times daily for 3-4 days - as needed 1 each 0    Melatonin 5 MG Oral Tab Take by mouth nightly.      Diclofenac Sodium 1 % External Gel Apply 1 g topically in the morning and 1 g before bedtime.      PEG 3350 17 g Oral Powd Pack Take by mouth. Take every 2-3 days      Vitamin D3 (VITAMIN D3) 2000 UNITS Oral Cap Take 1,000 Units by mouth daily.      Olopatadine HCl 0.2 % Ophthalmic Solution 1 drop by Each eye route daily.         Allergies:   Allergies   Allergen Reactions    Perfumes ASTHMA     Floral scents and pine,and stargazer lillies    Other RASH and FACE FLUSHING     Dial soap  Scented detergents    Adhesive Tape RASH    Cephalosporins ITCHING    Hydrocodone ITCHING    Duricef [Cefadroxil] ITCHING    Estradiol ITCHING    Methocarbamol RASH     Other name robaxin    Potato DIARRHEA     Potato skins    Sulfanilamide RASH       Past Medical History:    Allergic rhinitis    Asthma (HCC)    Basal cell carcinoma    excision- back    Basal cell carcinoma of chin    Wide excision lesion to Right chin BCC / VY flap    BCC (basal cell carcinoma)    Right chin    Breast cancer (HCC)    9/19/07(right breast lumpectomy with sentinel LN biopsy) 2.0 cm infiltrating poorly differentiated ductal     Cancer (HCC)    breast cancer and basal cell carcinoma    Cataract    Chickenpox    GERD (gastroesophageal reflux disease)    Measles    Migraines    occular migraines    Mumps    Osteoarthritis    Visual impairment    wears glasses     Past Surgical History:   Procedure  Laterality Date    Biopsy of breast, needle core Right 2007 (core biopsy) Poorly differentiated infiltrating ductal carcinoma R breast (triple negative, Ki   67,  86%)           Chemotherapy Right 2008    right breast    Colonoscopy      with biopsy    Colonoscopy N/A 2019    Procedure: COLONOSCOPY;  Surgeon: Chauncey Melissa MD;  Location: [facility] ENDOSCOPY    Lumpectomy right Right 2007    and multiple sentinel lymph node excisions    Radiation right Right 2008    right breast     Social History     Socioeconomic History    Marital status:      Spouse name: Not on file    Number of children: Not on file    Years of education: Not on file    Highest education level: Not on file   Occupational History    Not on file   Tobacco Use    Smoking status: Former     Current packs/day: 0.00     Average packs/day: 1 pack/day for 10.0 years (10.0 ttl pk-yrs)     Types: Cigarettes     Start date: 1971     Quit date: 1981     Years since quittin.6    Smokeless tobacco: Never   Vaping Use    Vaping status: Never Used   Substance and Sexual Activity    Alcohol use: Yes     Alcohol/week: 0.0 standard drinks of alcohol     Comment: social    Drug use: No     Comment: NONE    Sexual activity: Yes   Other Topics Concern     Service Not Asked    Blood Transfusions Not Asked    Caffeine Concern Yes     Comment: chocolate--Per NG no    Occupational Exposure Not Asked    Hobby Hazards Not Asked    Sleep Concern Not Asked    Stress Concern Not Asked    Weight Concern Not Asked    Special Diet Not Asked    Back Care Not Asked    Exercise No    Bike Helmet Not Asked    Seat Belt Not Asked    Self-Exams Not Asked    Grew up on a farm Not Asked    History of tanning No    Outdoor occupation Not Asked    Pt has a pacemaker No    Pt has a defibrillator No    Breast feeding Not Asked    Reaction to local anesthetic No    Left Handed Not Asked    Right Handed Yes    Currently spends a great  deal of time in the sun No    Past Sunlamp Treatments for Acne Not Asked    Hx of Spending Great Deal of Time in Sun Yes    Bad sunburns in the past Yes    Tanning Salons in the Past No    Hx of Radiation Treatments Yes    Regular use of sun block Not Asked   Social History Narrative    Not on file     Social Determinants of Health     Financial Resource Strain: Not on file   Food Insecurity: Not on file   Transportation Needs: Not on file   Physical Activity: Not on file   Stress: Not on file   Social Connections: Not on file   Housing Stability: Not on file     Family History   Problem Relation Age of Onset    Lipids Father         hyperlipideia    Heart Disease Father         CAD    Stroke Father         TIAs    Dementia Father     Heart Attack Father         myocardial infarction    Thyroid disease Mother     Asthma Mother     Breast Cancer Mother 74    Cancer Mother     Other (Other) Mother     Other (copd) Mother         cause of death- passed at age 86    Alcohol and Other Disorders Associated Other         alcoholism grandmother    Skin cancer Sister         Basal cell carcinoma-chest    Cancer Sister     Breast Cancer Self 54    Lipids Brother     Ovarian Cancer Neg        There were no vitals filed for this visit.    HPI:    Chief Complaint   Patient presents with    Full Skin Exam     LOV 5/31/23. Pt with Hx of BCC, present for FBSE. Pt with c/o cyst on upper back, for unknown amount of time. Denies drainage, but admits to soreness.     Follow-up history of BCC chin 2020, back 2002, history of AK's   Follow-up no particular concerns for patient.  Has been careful sun protection.    Please's past notes/ records and appropriate/relevant lab results including pathology and past body maps reviewed. Including outside notes/ PCP notes as appropriate. Updated and new information noted in current visit.     Patient presents with concerns above.    Patient has been in their usual state of health.      History,  medications, allergies reviewed as noted.      ROS:  new relevant systemic complaints as noted       Physical Examination:     Well-developed well-nourished patient alert oriented in no acute distress.  Exam total-body performed, including scalp, head, neck, face,nails, hair, external eyes, including conjunctival mucosa, eyelids, lips external ears, back, chest,/ breasts, axillae,  abdomen, arms, legs, palms.     Multiple light to medium brown, well marginated, uniformly pigmented, macules and papules 6 mm and less scattered on exam. pigmented lesions examined with dermoscopy benign-appearing patterns.     Waxy tannish keratotic papules scattered, cherry-red vascular papules scattered.    See map today's date for lesions noted .      Otherwise remarkable for lesions as noted on map.  See details of examination  See Assessment /Plan for additional history and physical exam also:    Assessment / plan:    No orders of the defined types were placed in this encounter.      Meds & Refills for this Visit:  Requested Prescriptions      No prescriptions requested or ordered in this encounter         Encounter Diagnoses   Name Primary?    Inflamed epidermoid cyst of skin Yes    Multiple nevi     Solar lentigo     Seborrheic keratoses     Epidermal cyst     History of actinic keratoses     Encounter for follow-up surveillance of skin cancer        See details on map.      Remarkable for:    Patient seen for follow-up long-term monitoring, treatment of  History of skin cancer, AK's, pigmented lesions requiring monitoring  Plan of care:  ongoing surveillance, monitoring including regular follow-up due to longer term risk of recurrence, new lesions.  See previous notes.  There is a longitudinal care relationship with me, the care plan reflects the ongoing nature of the continuous relationship of care, and the medical record indicates that there is ongoing treatment of a serious/complex medical condition which I am currently  managing.  is Applicable    Inflamed epidermal cyst at posterior neck now 3 cm.  Edema erythema surrounding.  Smaller lesion at posterior neck superior monitor.  Recommend excision with general surgery of larger lesion    Medium brown macule at right lower back observe benign pattern on dermoscopy 3 x 5 mm.  Monitor.  Benign intradermal nevus.    No other significant changes in nevi    Actinic Keratoses.  Precancerous nature discussed. Sun protection, sunscreen/ blocks encouraged .  Monitoring for new lesions.  Sun damage additional recurrent and new actinic keratoses, skin cancers may occur in areas of prior actinic keratoses, related to past sun exposure to minimize current sun exposure.  Sunscreen applied consistently regularly, reapplication and sun protection while driving recommended.    Breast SKs as noted  Lesions as noted waxy tan papules on the back consistent with benign keratoses cysts posterior neck upper back.  Lentigines over the scalp face, no recurrence of prior skin cancer at chin.  Overall moderate sun damage, no new suspicious lesions exam appears stable.    Reassurance [place] angiomas  No recurrence of BCC chin or back  Please refer to map for specific lesions.  See additional diagnoses.  Pros cons of various therapies, risks benefits discussed.Pathophysiology discussed with patient.  Therapeutic options reviewed.  See  Medications in grid.  Instructions reviewed at length.    Benign nevi, seborrheic  keratoses, cherry angiomas:  Reassurance regarding other benign skin lesions.Signs and symptoms of skin cancer, ABCDE's of melanoma discussed with patient. Sunscreen use, sun protection, self exams reviewed.  Followup as noted RTC routine checkup 6 mos - one year or p.r.n.    Encounter Times Including precharting, reviewing chart, prior notes obtaining history: 10 minutes, medical exam :10 minutes, notes on body map, plan, counseling 10minutes My total time spent caring for the patient on the  day of the encounter: 30 minutes     The patient indicates understanding of these issues and agrees to the plan.  The patient is asked to return as noted in follow-up/ above.    This note was generated using Dragon voice recognition software.  Please contact me regarding any confusion resulting from errors in recognition.   Note to patient and family: The 21st Century Cures Act makes medical notes like these available to patients. However, be advised this is a medical document. It is intended as [id number] communication and monitoring of a patient's care needs. It is written in medical language and may contain abbreviations or verbiage that are unfamiliar. It may appear blunt or direct. Medical documents are intended to carry relevant information, facts as evident and the clinical opinion of the practitioner.

## 2024-08-07 ENCOUNTER — PATIENT MESSAGE (OUTPATIENT)
Dept: ENDOCRINOLOGY CLINIC | Facility: CLINIC | Age: 69
End: 2024-08-07

## 2024-08-07 NOTE — TELEPHONE ENCOUNTER
From: Maria Esther Foster  To: Marisol Leija  Sent: 8/7/2024 9:58 AM CDT  Subject: Reclast and surgery    Hel Dr Leija,    Following up on my Feb 26 appt with you, I expect that I will be able to receive the Reclast treatment after Aug 26 and will be forwarding a signed Dental Clearance Letter to you in the next day or so.  But before sending the form to the dentist to sign, a couple of new issues have come up recently and I wanted your advice.   The issues are:  1. I will be having some surgery to remove a cyst from my shoulder/neck area, as yet unscheduled.   and   2. I just had an ultrasound that indicates a possible fatty liver. I have only seen the test results in [facility], have not heard from Dr Marinelli yet as to what this would mean. (Ultrasound was on Aug 4)    Will either of these impact, or be impacted by, the Reclast treatment?  Thank you,  Maria Esther Fotser

## 2024-08-08 ENCOUNTER — TELEPHONE (OUTPATIENT)
Dept: INTERNAL MEDICINE CLINIC | Facility: CLINIC | Age: 69
End: 2024-08-08

## 2024-08-08 NOTE — TELEPHONE ENCOUNTER
Patient does have mild fatty  infiltration of the liver and minimally complicated  cyst in  left lobe  liver .  But Normal gallbladder, biliary tree, pancreas, spleen, kidneys, aorta and IVC.             Keep with low fat diet   Will recheck Us  in 1 year     F/u visit as scheduled in 9/24     Message sent via  my chart

## 2024-08-08 NOTE — TELEPHONE ENCOUNTER
cholecalciferol (vitamin D3) capsule CAPS 5,000 Units   Last Written Prescription Date:  unknown  Last Fill Quantity: unknown,   # refills: unknown  Last Office Visit : 12/15/16  Future Office visit:  None    Routing refill request to provider for review/approval because:  cholecalciferol (vitamin D3) capsule CAPS 5,000 Units. On med list as historical. D/c @ discharge 1/27/17 rf?         Patient had US done 8/4/24  She has been waiting for result note from PCP  Patient understands these get released to [facility] before providers often have time to see and review.     She is anxious about results and would like input from PCP on this US     Please review and advise if any recommendations for her based on results    Reassured patient she will watch mychart or will await call back.

## 2024-08-09 ENCOUNTER — PATIENT MESSAGE (OUTPATIENT)
Dept: INTERNAL MEDICINE CLINIC | Facility: CLINIC | Age: 69
End: 2024-08-09

## 2024-08-09 RX ORDER — ZOLEDRONIC ACID 5 MG/100ML
5 INJECTION, SOLUTION INTRAVENOUS ONCE
Qty: 100 ML | Refills: 0 | Status: SHIPPED | OUTPATIENT
Start: 2024-08-09 | End: 2024-08-09

## 2024-08-09 NOTE — TELEPHONE ENCOUNTER
Patient responding to Dr. Marinelli's result note message.      Dear Maria Esther,     Your ultrasound of the abdomen shows patient  mild fatty  infiltration of the liver and minimally complicated  cyst in  left lobe  liver .  But Normal gallbladder, biliary tree, pancreas, spleen, kidneys, aorta and IVC inferior vena cava-.  There is no gallstones     Recommend you to keep with low saturated fat diet less red meat fried food try to lose some weight  This changes would not cause the pain that you experienced     Will possibly recheck Us liver in in 1 year     F/u visit as scheduled in 9/24           I recommend you to continue present management and have a follow up visit as scheduled on September 14 or sooner if any concerns or symptoms  Best RegardsARIANA M.D.   Written by eWs Marinelli MD on 8/9/2024  7:27 AM CDT  Seen by patient Maria Esther Foster on 8/9/2024  9:49 AM        Please see MyChart message below and advise. Thank you.

## 2024-08-09 NOTE — TELEPHONE ENCOUNTER
Received Rx from [facility] filled out reclast forms & placed in provider folder for review and signature.

## 2024-08-09 NOTE — TELEPHONE ENCOUNTER
Noted  Form reviewed  Reclast order signed and in signed folder  Can please fax to infusion center    thanks

## 2024-08-09 NOTE — TELEPHONE ENCOUNTER
Written by Wes Marinelli MD on 8/9/2024  7:27 AM CDT  Seen by patient Maria Esther Spenceers on 8/9/2024  9:49 AM    See results notes for detail on this if needed, patient has reviewed provider notes in mychart.

## 2024-08-09 NOTE — TELEPHONE ENCOUNTER
From: Maria Esther Foster  To: Wes Marinelli  Sent: 8/9/2024 9:59 AM CDT  Subject: Fatty Liver and Reclast    Thank you, Dr. Marinelli, for reviewing my test results and apologies for rushing you.   My rush was prompted the fact I will be receiving Reclast within the coming weeks and wanted to be sure there were no contraindications with the fatty liver and Reclast. (I forgot to mention this to the nurse yesterday.)   Please advise whether there is a problem.  Thank you  Maria Esther Foster

## 2024-08-14 ENCOUNTER — OFFICE VISIT (OUTPATIENT)
Dept: SURGERY | Facility: CLINIC | Age: 69
End: 2024-08-14
Payer: COMMERCIAL

## 2024-08-14 DIAGNOSIS — M79.89 SOFT TISSUE MASS: Primary | ICD-10-CM

## 2024-08-14 PROCEDURE — 1159F MED LIST DOCD IN RCRD: CPT

## 2024-08-14 PROCEDURE — 1160F RVW MEDS BY RX/DR IN RCRD: CPT

## 2024-08-14 PROCEDURE — 99202 OFFICE O/P NEW SF 15 MIN: CPT

## 2024-08-14 PROCEDURE — 1125F AMNT PAIN NOTED PAIN PRSNT: CPT

## 2024-08-14 NOTE — H&P
HPI:    Patient ID: Maria Esther Foster is a 69 year old female presenting with   Chief Complaint   Patient presents with    Consult     Pt. Present for a consult on a cyst on her upper middle back. Pt. States she has been having the cyst x 4 years,  painful to the touch.        Maria Esther is a pleasant 70yo female presenting for consultation from Dr. Marinelli concerning an upper back cyst. Present for multiple years, growing slowly over time, now painful and bothersome. No drainage, fever, chills, or surrounding erythema.     Past Medical History:    Allergic rhinitis    Asthma (HCC)    Basal cell carcinoma    excision- back    Basal cell carcinoma of chin    Wide excision lesion to Right chin BCC / VY flap    BCC (basal cell carcinoma)    Right chin    Breast cancer (HCC)    07(right breast lumpectomy with sentinel LN biopsy) 2.0 cm infiltrating poorly differentiated ductal     Cancer (HCC)    breast cancer and basal cell carcinoma    Cataract    Chickenpox    GERD (gastroesophageal reflux disease)    Measles    Migraines    occular migraines    Mumps    Osteoarthritis    Visual impairment    wears glasses     Past Surgical History:   Procedure Laterality Date    Biopsy of breast, needle core Right 2007 (core biopsy) Poorly differentiated infiltrating ductal carcinoma R breast (triple negative, Ki   67,  86%)           Chemotherapy Right     right breast    Colonoscopy      with biopsy    Colonoscopy N/A 2019    Procedure: COLONOSCOPY;  Surgeon: Chauncey Melissa MD;  Location: [facility] ENDOSCOPY    Lumpectomy right Right     and multiple sentinel lymph node excisions    Radiation right Right 2008    right breast     Family History   Problem Relation Age of Onset    Lipids Father         hyperlipideia    Heart Disease Father         CAD    Stroke Father         TIAs    Dementia Father     Heart Attack Father         myocardial infarction    Thyroid disease Mother     Asthma  Mother     Breast Cancer Mother 74    Cancer Mother     Other (Other) Mother     Other (copd) Mother         cause of death- passed at age 86    Alcohol and Other Disorders Associated Other         alcoholism grandmother    Skin cancer Sister         Basal cell carcinoma-chest    Cancer Sister     Breast Cancer Self 54    Lipids Brother     Ovarian Cancer Neg      Social History     Socioeconomic History    Marital status:      Spouse name: Not on file    Number of children: Not on file    Years of education: Not on file    Highest education level: Not on file   Occupational History    Not on file   Tobacco Use    Smoking status: Former     Current packs/day: 0.00     Average packs/day: 1 pack/day for 10.0 years (10.0 ttl pk-yrs)     Types: Cigarettes     Start date: 1971     Quit date: 1981     Years since quittin.6    Smokeless tobacco: Never   Vaping Use    Vaping status: Never Used   Substance and Sexual Activity    Alcohol use: Yes     Alcohol/week: 0.0 standard drinks of alcohol     Comment: social    Drug use: No     Comment: NONE    Sexual activity: Yes   Other Topics Concern     Service Not Asked    Blood Transfusions Not Asked    Caffeine Concern Yes     Comment: chocolate--Per NG no    Occupational Exposure Not Asked    Hobby Hazards Not Asked    Sleep Concern Not Asked    Stress Concern Not Asked    Weight Concern Not Asked    Special Diet Not Asked    Back Care Not Asked    Exercise No    Bike Helmet Not Asked    Seat Belt Not Asked    Self-Exams Not Asked    Grew up on a farm Not Asked    History of tanning No    Outdoor occupation Not Asked    Pt has a pacemaker No    Pt has a defibrillator No    Breast feeding Not Asked    Reaction to local anesthetic No    Left Handed Not Asked    Right Handed Yes    Currently spends a great deal of time in the sun No    Past Sunlamp Treatments for Acne Not Asked    Hx of Spending Great Deal of Time in Sun Yes    Bad sunburns in the past  Yes    Tanning Salons in the Past No    Hx of Radiation Treatments Yes    Regular use of sun block Not Asked   Social History Narrative    Not on file     Social Determinants of Health     Financial Resource Strain: Not on file   Food Insecurity: Not on file   Transportation Needs: Not on file   Physical Activity: Not on file   Stress: Not on file   Social Connections: Not on file   Housing Stability: Not on file       Review of Systems   Constitutional:  Negative for chills, diaphoresis and fever.   Respiratory:  Negative for shortness of breath.    Cardiovascular:  Negative for chest pain.   Gastrointestinal:  Negative for abdominal pain, nausea and vomiting.   Neurological:  Negative for weakness.           Current Outpatient Medications   Medication Sig Dispense Refill    Loratadine (CLARITIN OR) Take 10 mg by mouth daily.      montelukast 10 MG Oral Tab Take 1 tablet (10 mg total) by mouth nightly. 90 tablet 3    pantoprazole 40 MG Oral Tab EC TAKE 1 TABLET BY MOUTH IN  THE MORNING BEFORE  BREAKFAST 90 tablet 3    albuterol (PROAIR HFA) 108 (90 Base) MCG/ACT Inhalation Aero Soln Inhale 2 puffs into the lungs every 4 (four) hours as needed for Wheezing or Shortness of Breath (cough). 3 each 3    Estradiol 10 MCG Vaginal Tab Take 1 tablet twice a week      clobetasol 0.05 % External Ointment Apply 1 Application. topically As Directed. Apply thin layer to affected area twice a day for a week, then once a day for a week, then once every other day for a week 30 g 1    Calcium Citrate-Vitamin D 315-250 MG-UNIT Oral Tab Take 1 tablet by mouth in the morning and 1 tablet before bedtime.      cyanocobalamine 1000 MCG Oral Tab Take 1 tablet (1,000 mcg total) by mouth daily.      SALINE MIST SPRAY NA 2 sprays by Each Nare route 2 (two) times a day.      triamcinolone acetonide 0.1 % Mouth/Throat Paste Apply locally On canker sores 2-3 times daily for 3-4 days - as needed 1 each 0    Melatonin 5 MG Oral Tab Take by mouth  nightly.      Diclofenac Sodium 1 % External Gel Apply 1 g topically in the morning and 1 g before bedtime.      PEG 3350 17 g Oral Powd Pack Take by mouth. Take every 2-3 days      Vitamin D3 (VITAMIN D3) 2000 UNITS Oral Cap Take 1,000 Units by mouth daily.      Olopatadine HCl 0.2 % Ophthalmic Solution 1 drop by Each eye route daily.           Allergies:  Allergies   Allergen Reactions    Perfumes ASTHMA     Floral scents and pine,and stargazer lillies    Other RASH and FACE FLUSHING     Dial soap  Scented detergents    Adhesive Tape RASH    Cephalosporins ITCHING    Hydrocodone ITCHING    Duricef [Cefadroxil] ITCHING    Estradiol ITCHING    Methocarbamol RASH     Other name robaxin    Potato DIARRHEA     Potato skins    Sulfanilamide RASH      PHYSICAL EXAM:   There were no vitals taken for this visit.  Physical Exam  Constitutional:       General: She is not in acute distress.     Appearance: Normal appearance.   HENT:      Head: Normocephalic and atraumatic.      Right Ear: External ear normal.      Left Ear: External ear normal.      Nose: Nose normal.   Pulmonary:      Effort: Pulmonary effort is normal. No respiratory distress.   Skin:     Comments: 1.5cm soft tissue mass on upper back. Mild tenderness, no surrounding erythema.   Neurological:      Mental Status: She is alert and oriented to person, place, and time.   Psychiatric:         Mood and Affect: Mood normal.         Behavior: Behavior normal.         Thought Content: Thought content normal.         Judgment: Judgment normal.               ASSESSMENT/PLAN:   Diagnoses and all orders for this visit:    Soft tissue mass    Discussed diagnosis with patient, likely cyst given presentation. Pt desires excision. Plan in-office excision. Explained procedure, including risks, complications, and expected follow up. She is understanding and willing to proceed.          Eric Machado PA-C  8/14/2024

## 2024-08-25 ENCOUNTER — PATIENT MESSAGE (OUTPATIENT)
Dept: ENDOCRINOLOGY CLINIC | Facility: CLINIC | Age: 69
End: 2024-08-25

## 2024-08-25 DIAGNOSIS — M81.0 AGE-RELATED OSTEOPOROSIS WITHOUT CURRENT PATHOLOGICAL FRACTURE: Primary | ICD-10-CM

## 2024-08-26 NOTE — TELEPHONE ENCOUNTER
Dr Leija,    Called Rhea from infusion center. Per Rhea, infusion on hold due to prolia received in February 2/26/24. RN confirmed that its been 6 months and she can be scheduled now.     Per [employer] Authorization is good until October. She will call her to schedule infusion.        Needs blood work to check her creatinine.     CMP pended.

## 2024-08-26 NOTE — TELEPHONE ENCOUNTER
From: Maria Esther Foster  To: Marisol Leija  Sent: 8/25/2024 4:23 PM CDT  Subject: Following up regarding Reclast infusion    Eloisa Leija,    A couple weeks ago (Aug 9), you sent an order to the Infusion Center for a Reclast treatment, and said if I didn't hear in 2 weeks or so, to contact you again.     I have not heard from the Infusion Center yet and wondered if your office will follow up?     Thanks,  Maria Esther

## 2024-08-27 ENCOUNTER — TELEPHONE (OUTPATIENT)
Dept: SURGERY | Facility: CLINIC | Age: 69
End: 2024-08-27

## 2024-08-27 ENCOUNTER — PATIENT MESSAGE (OUTPATIENT)
Dept: SURGERY | Facility: CLINIC | Age: 69
End: 2024-08-27

## 2024-08-27 ENCOUNTER — LAB ENCOUNTER (OUTPATIENT)
Dept: LAB | Age: 69
End: 2024-08-27
Attending: INTERNAL MEDICINE
Payer: MEDICARE

## 2024-08-27 DIAGNOSIS — M81.0 AGE-RELATED OSTEOPOROSIS WITHOUT CURRENT PATHOLOGICAL FRACTURE: Primary | ICD-10-CM

## 2024-08-27 LAB
ALBUMIN SERPL-MCNC: 4.8 G/DL (ref 3.2–4.8)
ALBUMIN/GLOB SERPL: 1.6 {RATIO} (ref 1–2)
ALP LIVER SERPL-CCNC: 67 U/L
ALT SERPL-CCNC: 15 U/L
ANION GAP SERPL CALC-SCNC: 6 MMOL/L (ref 0–18)
AST SERPL-CCNC: 23 U/L (ref ?–34)
BILIRUB SERPL-MCNC: 0.6 MG/DL (ref 0.2–1.1)
BUN BLD-MCNC: 15 MG/DL (ref 9–23)
BUN/CREAT SERPL: 16.1 (ref 10–20)
CALCIUM BLD-MCNC: 10.4 MG/DL (ref 8.7–10.4)
CHLORIDE SERPL-SCNC: 108 MMOL/L (ref 98–112)
CO2 SERPL-SCNC: 28 MMOL/L (ref 21–32)
CREAT BLD-MCNC: 0.93 MG/DL
EGFRCR SERPLBLD CKD-EPI 2021: 66 ML/MIN/1.73M2 (ref 60–?)
FASTING STATUS PATIENT QL REPORTED: YES
GLOBULIN PLAS-MCNC: 3 G/DL (ref 2–3.5)
GLUCOSE BLD-MCNC: 103 MG/DL (ref 70–99)
OSMOLALITY SERPL CALC.SUM OF ELEC: 295 MOSM/KG (ref 275–295)
POTASSIUM SERPL-SCNC: 4.5 MMOL/L (ref 3.5–5.1)
PROT SERPL-MCNC: 7.8 G/DL (ref 5.7–8.2)
SODIUM SERPL-SCNC: 142 MMOL/L (ref 136–145)

## 2024-08-27 PROCEDURE — 84080 ASSAY ALKALINE PHOSPHATASES: CPT

## 2024-08-27 PROCEDURE — 36415 COLL VENOUS BLD VENIPUNCTURE: CPT | Performed by: INTERNAL MEDICINE

## 2024-08-27 PROCEDURE — 80053 COMPREHEN METABOLIC PANEL: CPT | Performed by: INTERNAL MEDICINE

## 2024-08-27 NOTE — TELEPHONE ENCOUNTER
Dr Leija- pt called stating she is currently registering at the lab for labwork for Reclast infusion. Per pt, states CMP is needed to check Creatinine.     Please sign pending order if appropriate.     Thanks!

## 2024-08-27 NOTE — TELEPHONE ENCOUNTER
Patient left following [employer] message, patient did not include photo but will keep trying to send photo via [employer].      Hi,     I will call the office but I wanted to send you this message as I've attached pics.      I know I'm seeing you on Sept 4 for the procedure for the mass on my upper back, but it's changed and I believe it is abscessed.   I've put Neosporin on it and have covered it with a gauze pad, in case it oozes. But was wondering if I need to take antibiotics?  Or take any other action?     Thank you,  Maria Esther

## 2024-08-28 NOTE — TELEPHONE ENCOUNTER
Per patient did pick medication, but requesting to speak to RN regarding medication, has questions prior to taking it. Please call thank you.

## 2024-08-28 NOTE — TELEPHONE ENCOUNTER
Called patient. She was informed by pharmacist that Augmentin can cause diarrhea and to avoid calcium. Patient had further questions regarding calcium interaction, so I advised clarify instructions with pharmacist. Patient verbalized understanding.

## 2024-08-29 LAB — ALKALINE PHOSPHATASE BONE SPECIFIC: 9.8 UG/L

## 2024-09-04 ENCOUNTER — OFFICE VISIT (OUTPATIENT)
Dept: SURGERY | Facility: CLINIC | Age: 69
End: 2024-09-04
Payer: COMMERCIAL

## 2024-09-04 DIAGNOSIS — L72.0 EPIDERMAL CYST: Primary | ICD-10-CM

## 2024-09-04 PROCEDURE — 11402 EXC TR-EXT B9+MARG 1.1-2 CM: CPT

## 2024-09-04 PROCEDURE — 1159F MED LIST DOCD IN RCRD: CPT

## 2024-09-04 PROCEDURE — 1160F RVW MEDS BY RX/DR IN RCRD: CPT

## 2024-09-04 NOTE — PROCEDURES
Procedure Note  The risks, benefits, alternatives and expected recovery were explained.  The pt expressed understanding and wished to proceed with the procedure.      Preop:  1.5 cm sebaceous cyst of the upper back  Postop:  Same  Procedure: Excision 1.5 cm sebaceous cyst of the upper back  Anesthesia:  Local, 1% lidocaine with epi, 8 cc  EBL:  Minimal  Description:  The skin was prepped and draped in sterile fashion.  The skin and subcutaneous tissue surrounding the cyst cavity was infiltrated with local anesthetic.  A 2 cm incision was made of the skin overlying the cyst.  The entire cyst capsule was excised.  4.0 Nylon was utilized to close the skin.  A gauze dressing was placed over this.  The patient tolerated the procedure well and was given wound care instructions.     The cyst was sent for routine pathology.   Eric Machado PA-C

## 2024-09-05 ENCOUNTER — OFFICE VISIT (OUTPATIENT)
Dept: HEMATOLOGY/ONCOLOGY | Facility: HOSPITAL | Age: 69
End: 2024-09-05
Attending: INTERNAL MEDICINE
Payer: MEDICARE

## 2024-09-05 VITALS
HEART RATE: 90 BPM | RESPIRATION RATE: 18 BRPM | SYSTOLIC BLOOD PRESSURE: 149 MMHG | OXYGEN SATURATION: 96 % | TEMPERATURE: 98 F | DIASTOLIC BLOOD PRESSURE: 71 MMHG

## 2024-09-05 DIAGNOSIS — M81.0 SENILE OSTEOPOROSIS: Primary | ICD-10-CM

## 2024-09-05 PROCEDURE — 96365 THER/PROPH/DIAG IV INF INIT: CPT

## 2024-09-05 RX ORDER — ZOLEDRONIC ACID 5 MG/100ML
5 INJECTION, SOLUTION INTRAVENOUS ONCE
Status: CANCELLED | OUTPATIENT
Start: 2024-09-05

## 2024-09-05 RX ORDER — ZOLEDRONIC ACID 5 MG/100ML
INJECTION, SOLUTION INTRAVENOUS
Status: COMPLETED
Start: 2024-09-05 | End: 2024-09-05

## 2024-09-05 RX ORDER — ZOLEDRONIC ACID 5 MG/100ML
5 INJECTION, SOLUTION INTRAVENOUS ONCE
Status: COMPLETED | OUTPATIENT
Start: 2024-09-05 | End: 2024-09-05

## 2024-09-05 RX ADMIN — ZOLEDRONIC ACID 5 MG: 5 INJECTION, SOLUTION INTRAVENOUS at 14:58:00

## 2024-09-05 NOTE — PROGRESS NOTES
Maria Esther to infusion today for Reclast. Pt denies any issues or concerns. Denies any recent or planned dental work. Discussed procedure for Reclast infusion with pt including medication profile, possible side effects, length of infusion, and  importance of increased hydration. Pt verbalized understanding.     Ordering Provider: Heladio  Order Exp: After this dose     Pt tolerated infusion without difficulty or complaint. PIV removed, site covered with gauze and coban. Pt discharged stable, ambulating independently.      Education Record  Learner:  Patient  Disease / Diagnosis: Osteoporosis  Barriers / Limitations:  None  Method:  Discussion and Reinforcement  General Topics:  Medication, Side effects and symptom management, and Plan of care reviewed  Outcome:  Shows understanding

## 2024-09-18 ENCOUNTER — OFFICE VISIT (OUTPATIENT)
Dept: SURGERY | Facility: CLINIC | Age: 69
End: 2024-09-18
Payer: COMMERCIAL

## 2024-09-18 VITALS — BODY MASS INDEX: 32 KG/M2 | WEIGHT: 178 LBS

## 2024-09-18 DIAGNOSIS — Z48.89 ENCOUNTER FOR POSTOPERATIVE CARE: Primary | ICD-10-CM

## 2024-09-18 PROCEDURE — 99024 POSTOP FOLLOW-UP VISIT: CPT

## 2024-09-18 PROCEDURE — 1159F MED LIST DOCD IN RCRD: CPT

## 2024-09-18 PROCEDURE — 1126F AMNT PAIN NOTED NONE PRSNT: CPT

## 2024-09-18 PROCEDURE — 1160F RVW MEDS BY RX/DR IN RCRD: CPT

## 2024-09-18 RX ORDER — ESTRADIOL 10 UG/1
10 INSERT VAGINAL
Qty: 24 TABLET | Refills: 0 | Status: SHIPPED | OUTPATIENT
Start: 2024-09-18

## 2024-09-18 NOTE — TELEPHONE ENCOUNTER
Requested Prescriptions     Pending Prescriptions Disp Refills    ESTRADIOL 10 MCG Vaginal Tab [Pharmacy Med Name: Estradiol 10 MCG Vaginal Tablet] 24 tablet 3     Sig: PLACE 1 INSERT VAGINALLY TWICE  WEEKLY     Last annual 10/19/23  Last filled 10/19/23 x 4 mo  Mammo UTD    Dr. Oconnor- last annual does not state when patient is due for next annual. Are you still seeing her yearly?

## 2024-09-18 NOTE — PROGRESS NOTES
S:  Maria Esther Foster is a 69 year old female sp sebaceous cyst excision  Doing well, no fevers, no chills, tolerating a general diet, generally normal bowel movements, no calf tenderness nor lower extremity edema, no shortness of breath, no chest pain.       O:  Wt 178 lb (80.7 kg)   BMI 31.53 kg/m²   GEN:  No acute distress  L: nonlabored respirations  H: reg rate  Abd:  Soft, NT,ND.  Skin: Incision C D I, no eryth  Extr: No edema, no calf tenderness    Path:  Reviewed w pt    Assessment   1. Encounter for postoperative care        Doing well post op, sutures removed and patient tolerated well  Continue to keep incision clean and dry.  Maintain a healthy diet.  Maintain good hydration.  F/u prn.         Eric Machado PA-C

## 2024-09-19 ENCOUNTER — OFFICE VISIT (OUTPATIENT)
Dept: INTERNAL MEDICINE CLINIC | Facility: CLINIC | Age: 69
End: 2024-09-19

## 2024-09-19 VITALS
DIASTOLIC BLOOD PRESSURE: 81 MMHG | HEART RATE: 79 BPM | SYSTOLIC BLOOD PRESSURE: 139 MMHG | WEIGHT: 178 LBS | BODY MASS INDEX: 31.54 KG/M2 | HEIGHT: 63 IN

## 2024-09-19 DIAGNOSIS — E78.00 PURE HYPERCHOLESTEROLEMIA: Primary | ICD-10-CM

## 2024-09-19 DIAGNOSIS — K21.9 GASTROESOPHAGEAL REFLUX DISEASE WITHOUT ESOPHAGITIS: ICD-10-CM

## 2024-09-19 DIAGNOSIS — K76.0 FATTY INFILTRATION OF LIVER: ICD-10-CM

## 2024-09-19 DIAGNOSIS — K76.89 LIVER CYST: ICD-10-CM

## 2024-09-19 PROCEDURE — 1159F MED LIST DOCD IN RCRD: CPT | Performed by: INTERNAL MEDICINE

## 2024-09-19 PROCEDURE — 1160F RVW MEDS BY RX/DR IN RCRD: CPT | Performed by: INTERNAL MEDICINE

## 2024-09-19 PROCEDURE — 3008F BODY MASS INDEX DOCD: CPT | Performed by: INTERNAL MEDICINE

## 2024-09-19 PROCEDURE — 3075F SYST BP GE 130 - 139MM HG: CPT | Performed by: INTERNAL MEDICINE

## 2024-09-19 PROCEDURE — 1126F AMNT PAIN NOTED NONE PRSNT: CPT | Performed by: INTERNAL MEDICINE

## 2024-09-19 PROCEDURE — 99214 OFFICE O/P EST MOD 30 MIN: CPT | Performed by: INTERNAL MEDICINE

## 2024-09-19 PROCEDURE — 3079F DIAST BP 80-89 MM HG: CPT | Performed by: INTERNAL MEDICINE

## 2024-09-19 PROCEDURE — G2211 COMPLEX E/M VISIT ADD ON: HCPCS | Performed by: INTERNAL MEDICINE

## 2024-09-19 NOTE — PROGRESS NOTES
HPI:   Maria Esther Foster is a   Chief Complaint   Patient presents with    Test Results     Here to discuss ultrasound abdomen completed on 8/4/24    Hyperlipidemia     F/u hyperlipidemia , f/u US liver    Patient states she has been doing well , she is taking pantoprazole  daily for heartburn send she feels well. states doing well otherwise, denies chest pain, shortness of breath, dyspnea on exertion or heart palpitations also denies nausea, vomiting, diarrhea, constipation No fever, chills, or UTI symptoms.   The rest of the review of systems, see below.       Patient Care Team: Patient Care Team:  Wes Marinelli MD as PCP - General (Internal Medicine)  Chiara Oconnor MD (OBSTETRICS & GYNECOLOGY)  Denise Duron MD (DERMATOLOGY)  Wes Marinelli MD as PCP (Internal Medicine)  Teresa Salcido DO (Physical Medicine)  González Rogres MD (Hematology and Oncology)  Audrey Jesus, TISHA (Nurse Practitioner)    Patient Active Problem List   Diagnosis    Personal history of malignant neoplasm of breast    Malignant neoplasm of right breast in female, estrogen receptor negative (HCC)    Personal history of skin cancer    Inflamed seborrheic keratosis    Mild intermittent asthma without complication (HCC)    Pure hypercholesterolemia    Actinic keratosis    Sleep disturbance    Primary osteoarthritis of both knees    Senile osteoporosis    Epidermal cyst    Encounter for follow-up surveillance of breast cancer    Neoplasm of uncertain behavior of skin    History of actinic keratoses    Lateral epicondylitis of left elbow    Vitamin B12 deficiency    Gastroesophageal reflux disease without esophagitis    Fatty infiltration of liver    Liver cyst     Wt Readings from Last 3 Encounters:   09/19/24 178 lb (80.7 kg)   09/18/24 178 lb (80.7 kg)   07/25/24 178 lb 6.4 oz (80.9 kg)      Last Cholesterol Labs:   Lab Results   Component Value Date    CHOLEST 209 (H) 07/12/2024    HDL 77 (H) 07/12/2024     (H) 07/12/2024     TRIG 66 07/12/2024          Last Chemistry Labs:   Lab Results   Component Value Date    AST 23 08/27/2024    ALT 15 08/27/2024    CA 10.4 08/27/2024    ALB 4.8 08/27/2024    TSH 1.572 07/12/2024    CREATSERUM 0.93 08/27/2024     (H) 08/27/2024        CBC  (most recent labs)   Lab Results   Component Value Date    WBC 5.5 07/12/2024    HGB 12.9 07/12/2024    .0 07/12/2024        ALLERGIES:   She is allergic to perfumes, other, adhesive tape, cephalosporins, hydrocodone, duricef [cefadroxil], estradiol, methocarbamol, potato, and sulfanilamide.    CURRENT MEDICATIONS:   Outpatient Medications Marked as Taking for the 9/19/24 encounter (Office Visit) with Wes Marinelli MD   Medication Sig    Estradiol 10 MCG Vaginal Tab Place 10 mcg vaginally twice a week.    Loratadine (CLARITIN OR) Take 10 mg by mouth daily.    montelukast 10 MG Oral Tab Take 1 tablet (10 mg total) by mouth nightly.    pantoprazole 40 MG Oral Tab EC TAKE 1 TABLET BY MOUTH IN  THE MORNING BEFORE  BREAKFAST    albuterol (PROAIR HFA) 108 (90 Base) MCG/ACT Inhalation Aero Soln Inhale 2 puffs into the lungs every 4 (four) hours as needed for Wheezing or Shortness of Breath (cough).    clobetasol 0.05 % External Ointment Apply 1 Application. topically As Directed. Apply thin layer to affected area twice a day for a week, then once a day for a week, then once every other day for a week    cyanocobalamine 1000 MCG Oral Tab Take 1 tablet by mouth three times per week    SALINE MIST SPRAY NA 2 sprays by Each Nare route 2 (two) times a day.    triamcinolone acetonide 0.1 % Mouth/Throat Paste Apply locally On canker sores 2-3 times daily for 3-4 days - as needed    Melatonin 5 MG Oral Tab Take by mouth nightly.    Diclofenac Sodium 1 % External Gel Apply 1 g topically in the morning and 1 g before bedtime.    PEG 3350 17 g Oral Powd Pack Take by mouth. Once per week    Vitamin D3 (VITAMIN D3) 2000 UNITS Oral Cap Take 1,000 Units by mouth  daily.    Olopatadine HCl 0.2 % Ophthalmic Solution 1 drop by Each eye route daily.        MEDICAL INFORMATION:   She  has a past medical history of Allergic rhinitis, Asthma (), Basal cell carcinoma (), Basal cell carcinoma of chin (2020), BCC (basal cell carcinoma) (2020), Breast cancer (HCC) (2007), Cancer ([facility]) (), Cataract, Chickenpox, GERD (gastroesophageal reflux disease), Measles, Migraines, Mumps, Osteoarthritis, and Visual impairment.    She  has a past surgical history that includes biopsy of breast, needle core (Right, 2007); ; colonoscopy (); colonoscopy (N/A, 2019); radiation right (Right, ); lumpectomy right (Right, ); and chemotherapy (Right, ).    Her family history includes Alcohol and Other Disorders Associated in an other family member; Asthma in her mother; Breast Cancer (age of onset: 53) in her self; Breast Cancer (age of onset: 74) in her mother; Cancer in her mother and sister; Dementia in her father; Heart Attack in her father; Heart Disease in her father; Lipids in her brother and father; Other in her mother; Skin cancer in her sister; Stroke in her father; Thyroid disease in her mother; copd in her mother.   SOCIAL HISTORY:   She  reports that she quit smoking about 43 years ago. Her smoking use included cigarettes. She started smoking about 53 years ago. She has a 10 pack-year smoking history. She has never used smokeless tobacco. She reports current alcohol use. She reports that she does not use drugs.     REVIEW OF SYSTEMS:      GENERAL: feels well otherwise  HEENT: denies nasal congestion, sinus pain or ST  LUNGS: denies shortness of breath with exertion  CARDIOVASCULAR: denies chest pain on exertion  GI: denies abdominal pain -today see HPI, heartburn-stable on PPI  : denies dysuria, vaginal discharge or itching, no complaint of urinary incontinence   MUSCULOSKELETAL: denies back pain  NEURO: denies headaches  PSYCHE:  denies depression or anxiety  HEMATOLOGIC: denies hx of anemia  ENDOCRINE: denies thyroid history  ALL/ASTHMA: denies hx of allergy or asthma   EXAM:   /81 (BP Location: Left arm, Patient Position: Sitting, Cuff Size: large)   Pulse 79   Ht 5' 3\" (1.6 m)   Wt 178 lb (80.7 kg)   BMI 31.53 kg/m²  Estimated body mass index is 31.53 kg/m² as calculated from the following:    Height as of this encounter: 5' 3\" (1.6 m).    Weight as of this encounter: 178 lb (80.7 kg).    Physical Exam   GENERAL: well developed, well nourished,in no apparent distress  HEENT: atraumatic, normocephalic ,  LUNGS: clear to auscultation bilaterally no wheezing   CARDIO:S1,S2 normal , RRR  , no murmur   GI: present BS's in all quadrants  , no tenderness or distension no mass, no HSM   MUSCULOSKELETAL: Rom good  , no joints swelling   r lower leg - tenderness  localised - no  bruise -  tenderness of the  bone .- hx  5  days ago   EXTREMITIES: no cyanosis, clubbing or edema  SKIN :no rash   NEURO: Oriented times three,cranial nerves are intact,motor and sensory are grossly intact  PSYCHIATRIC - no  anxiety or depression symptoms       Immunization History   Administered Date(s) Administered    Covid-19 Vaccine Pfizer 30 mcg/0.3 ml 02/23/2021, 03/16/2021, 09/27/2021, 07/01/2022    Covid-19 Vaccine Pfizer Bivalent 30mcg/0.3mL 10/25/2022, 07/18/2023    FLU VAC High Dose 65 YRS & Older PRSV Free (39391) 10/06/2020, 10/13/2022, 10/25/2023    FLU VAC QIV SPLIT 3 YRS AND OLDER (96345) 10/31/2015    FLUAD High Dose 65 yr and older (08314) 10/09/2019    FLUZONE 6 months and older PFS 0.5 ml (01994) 10/27/2016, 09/23/2017, 02/15/2019    Fluarix 6 Months And Older 0.5 ml prefilled syringe (00912) 02/15/2019    Fluzone Vaccine Medicare () 10/06/2020    Influenza 11/14/2006, 10/11/2007, 11/01/2015, 10/07/2019, 10/12/2021, 10/25/2023    Influenza Vaccine, Preserv Free 11/17/2010    Moderna Covid-19 Vaccine 50mcg/0.5ml 12yrs+ 03/29/2024     Pneumococcal (Prevnar 13) 07/12/2019    Pneumovax 23 05/12/2021    RSV, recombinant, RSVpreF, adjuvanted (Arexvy) 09/13/2023    TD 07/26/2007    TDAP 02/08/2019    Zoster Vaccine Live (Zostavax) 06/20/2014    Zoster Vaccine Recombinant Adjuvanted (Shingrix) 07/28/2021, 12/01/2021      Blood pressure 139/81, pulse 79, height 5' 3\" (1.6 m), weight 178 lb (80.7 kg).      Pure hypercholesterolemia  Keep low saturated fat  diet   Avoid red meat and fast/fried food  fruits and vegetables   Keep active /walking ,exercise as  tolerated  Reach ideal weight   Continue present management   Labs to complete   Keep with low fat diet  Labs  - monitor         Fatty liver    Liver  enzymes normal   US   liver  d/w  pt   Liver cyst -   F/u  - Dr Amaya   1. There is diffuse increase in the echogenicity of the liver compatible with either fatty infiltration, or hepatic parenchymal disease.  Normal liver size.  Patent main portal vein and hepatic veins.  Minimally complicated cyst in the left lobe   measuring 1.7 x 1.2 x 1.8 cm with minimal internal echoes.     2. Normal gallbladder, biliary tree, pancreas, spleen, kidneys, aorta and IVC.          Mild intermittent asthma without complication  Stable cpm    proair  Inh  2 puff - As needed         Primary osteoarthritis of both knees  Doing  exercise   Stable       Malignant neoplasm of upper-outer quadrant of right breast in female, estrogen receptor negative (HCC)  2007  Surgery  lymp and lymph node   Had Chemo - 4 Months    2007 and 2008   And  Rtg th    For  6  Weeks   2008 completed   F/u Mammogram in 7/24    Dr Garcia   - 7/24   Stable cpm   F/u  Oncology  regularly     Age-related osteoporosis without current pathological fracture  Dexa  Scan   Discussed    prolia  q   6  months  -- >  Recast 3/24  Calcium  600 mg every day  bid   Vitamin D3 400 U bid   Vit D3 - 2000 u every day          Gastroesophageal reflux disease without esophagitis     Patient advised to avoid spicy food,  coffee, tea, caffeinated drinks, alcohol, acidic food/juices  Advised to avoid NSAID's - Aspirin-based medications  Advised to avoid wearing  tight clothes   Advised to elevate the head of the bed   Avoid eating at least 3 hours before bedtime   Counseling on ideal weight/BMI  Take pantoprazole 40 mg PPIs qd in the morning 30-60 minutes before breakfast always on empty stomach Side effects and directions of medication discussed with patient. Patient verbalized understanding and compliance.          Diet assessment: good     PLAN:  The patient indicates understanding of these issues and agrees to the plan.  Reinforced healthy diet, lifestyle, and exercise.    No follow-ups on file.     Wes Marinelli MD, 3/2/2021     General Health            This section provided for quick review of chart, separate sheet to patient  PREVENTATIVE SERVICES  INDICATIONS AND SCHEDULE Internal Lab or Procedure External Lab or Procedure   Diabetes Screening      HbgA1C   Annually No results found for: \"A1C\"      No data to display                Fasting Blood Sugar (FSB)Annually Glucose (mg/dL)   Date Value   08/27/2024 103 (H)          Cardiovascular Disease Screening     LDL Annually LDL Cholesterol (mg/dL)   Date Value   07/12/2024 120 (H)        EKG - w/ Initial Preventative Physical Exam only, or if medically necessary Electrocardiogram date     Colorectal Cancer Screening      Colonoscopy Screen every 10 years Health Maintenance   Topic Date Due    Colorectal Cancer Screening  12/19/2029    Update Health Maintenance if applicable    Flex Sigmoidoscopy Screen every 10 years No results found for this or any previous visit.      No data to display                 Fecal Occult Blood Annually Occult Blood Result (no units)   Date Value   09/22/2021 Negative         No data to display                Glaucoma Screening      Ophthalmology Visit Annually: Diabetics, FHx Glaucoma, AA>50, > 65      No data to display                 Bone Density Screening      Dexascan Every two years Last Dexa Scan:    XR DEXA BONE DENSITOMETRY (CPT=77080) 10/12/2023        No data to display                Pap and Pelvic      Pap: Every 3 yrs age 21-65 or Pap+HPV every 5 yrs age 30-65, age 65 and older at high risk No recommendations at this time Update Health Maintenance if applicable    Chlamydia  Annually if high risk No results found for: \"CHLAMYDIA\"      No data to display                Screening Mammogram      Mammogram  Annually to 75, then as discussed Health Maintenance   Topic Date Due    Mammogram  07/01/2025    Update Health Maintenance if applicable     Immunizations (Update Immunization Activity if applicable)     Influenza  Covered Annually 10/25/2023 Please get every year    Pneumococcal 13 (Prevnar)  Covered Once after 65 07/12/2019 Please get once after your 65th birthday    Pneumococcal 23 (Pneumovax)  Covered Once after 65 No vaccine history found Please get once after your 65th birthday    Hepatitis B for Moderate/High Risk No vaccine history found Medium/high risk factors:   End-stage renal disease   Hemophiliacs who received Factor VIII or IX concentrates   Clients of institutions for the mentally retarded   Persons who live in the same house as a HepB virus carrier   Homosexual men   Illicit injectable drug abusers     Tetanus Toxoid  Only covered with a cut with metal- TD and TDaP Not covered by Medicare Part B) 07/26/2007 This may be covered with your prescription benefits, but Medicare does not cover unless Medically needed    Zoster   Not covered by Medicare Part B No vaccine history found This may be covered with your pharmacy  prescription benefits

## 2024-10-05 ENCOUNTER — PATIENT MESSAGE (OUTPATIENT)
Dept: INTERNAL MEDICINE CLINIC | Facility: CLINIC | Age: 69
End: 2024-10-05

## 2024-10-06 NOTE — TELEPHONE ENCOUNTER
From: Maria Esther Foster  To: Wes Marinelli  Sent: 10/5/2024 11:38 AM CDT  Subject: Covid Vax received     Hi,   Would you please add the following info to my record?    Covid -19 vax  Oct 3, 2024  Pf[employer]  Lot # [id number]  [place] [address] in [place]    Please let me know if you need any other info.    I'll be getting my Flu shot later this month.  Thank you,  Maria Esther Foster

## 2024-10-28 ENCOUNTER — PATIENT MESSAGE (OUTPATIENT)
Dept: INTERNAL MEDICINE CLINIC | Facility: CLINIC | Age: 69
End: 2024-10-28

## 2024-12-11 ENCOUNTER — OFFICE VISIT (OUTPATIENT)
Dept: GASTROENTEROLOGY | Facility: CLINIC | Age: 69
End: 2024-12-11

## 2024-12-11 VITALS
BODY MASS INDEX: 29.06 KG/M2 | WEIGHT: 164 LBS | DIASTOLIC BLOOD PRESSURE: 77 MMHG | HEIGHT: 63 IN | SYSTOLIC BLOOD PRESSURE: 134 MMHG | HEART RATE: 75 BPM

## 2024-12-11 DIAGNOSIS — R10.10 UPPER ABDOMINAL PAIN: Primary | ICD-10-CM

## 2024-12-11 DIAGNOSIS — K76.89 LIVER CYST: ICD-10-CM

## 2024-12-11 PROBLEM — J41.0 SMOKERS' COUGH (HCC): Chronic | Status: ACTIVE | Noted: 2024-12-11

## 2024-12-11 PROCEDURE — 3078F DIAST BP <80 MM HG: CPT | Performed by: INTERNAL MEDICINE

## 2024-12-11 PROCEDURE — 99205 OFFICE O/P NEW HI 60 MIN: CPT | Performed by: INTERNAL MEDICINE

## 2024-12-11 PROCEDURE — 1159F MED LIST DOCD IN RCRD: CPT | Performed by: INTERNAL MEDICINE

## 2024-12-11 PROCEDURE — 1160F RVW MEDS BY RX/DR IN RCRD: CPT | Performed by: INTERNAL MEDICINE

## 2024-12-11 PROCEDURE — 1126F AMNT PAIN NOTED NONE PRSNT: CPT | Performed by: INTERNAL MEDICINE

## 2024-12-11 PROCEDURE — 3008F BODY MASS INDEX DOCD: CPT | Performed by: INTERNAL MEDICINE

## 2024-12-11 PROCEDURE — 3075F SYST BP GE 130 - 139MM HG: CPT | Performed by: INTERNAL MEDICINE

## 2024-12-11 NOTE — PATIENT INSTRUCTIONS
1. Upper abdominal pain  2. Liver cyst    Recommend:  CT triple phase given history of breast CA and fatty liver  Colonoscopy due 5 years  Continue weight loss  Improved upper abdominal pain with diet and weight loss none since May/Ladonna    Here are some reflux precautions:   - Avoid trigger foods  - Anti-reflux measures: raising the head of the bed at night, avoiding tight clothing or belts, avoiding eating late at night and not lying down shortly after mealtime and achieving weight loss   - Avoid NSAID's, caffeine, peppermints, alcohol, tobacco and foods that incite symptoms       Ok to continue pantoprazole but discussed   Potential side-effects of long term PPI therapy are hip fractures(after 1 year of use, increased risk in post-menopausal women and smokers), increased risk of C. difficile colitis, hypomagnesemia, and community-acquired pneumonia.     It is good practice to screen for osteoporosis with bone density scans (at the appropiate screening age) and to start/continue calcium supplementation, even though there is inadequate evidence to mandate these interventions. Additionally, checking magnesium levels once a year while on PPI should be done. Most of these side-effects are dose related and PPI's should be used for conditions in which they have proven efficacy and at the minimal effective dosage.

## 2024-12-11 NOTE — H&P
[facility] - Gastroenterology  Clinic History and Physical     Chief Complaint   Patient presents with    Follow - Up     Ultrasound results       HPI:   Maria Esther Foster is a 69 year old female patient of Dr. Marinelli , with history of asthma, allergies, breast CA, migraines and reflux presenting for abnormal RUQ US.    Per patient had RUQ US for upper abdominal pain which showed cysts  Narrative   PROCEDURE: US ABDOMEN COMPLETE (CPT=76700)     COMPARISON: None.     INDICATIONS: Bilateral upper abdominal pain     TECHNIQUE:   The abdomen was evaluated with grayscale and colorflow of the main vessels.       FINDINGS:  LIVER: There is diffuse increase in the echogenicity of the liver compatible with either fatty infiltration, or hepatic parenchymal disease.  Normal liver size the right lobe measuring 15.5 cm in longitudinal length.  Minimally complicated cyst in the  left lobe measuring 1.7 x 1.2 x 1.8 cm with minimal internal echoes.  No significant masses. Color flow and Doppler imaging of portal and hepatic veins show patency and antegrade flow.  GALLBLADDER: Normal.  Normal size.  No calculi, wall thickening or pericholecystic fluid.  Sonographic Rizo's sign was not elicited.    BILE DUCTS: Normal.  Common bile duct measures 5.7 mm.    PANCREAS: Mildly atrophic but otherwise unremarkable.  SPLEEN: Normal.  Normal size and echotexture.  Spleen length measures 10.38 cm.  KIDNEYS: Normal.  No mass, obstruction or obvious calculus.  The right kidney length measures 9.83 cm.  The left kidney length measures 9.89 cm.    AORTA/VASCULAR: Normal.  No aneurysm.  Patent IVC.    OTHER: Negative.  No ascites or adenopathy seen.         Impression   CONCLUSION:  1. There is diffuse increase in the echogenicity of the liver compatible with either fatty infiltration, or hepatic parenchymal disease.  Normal liver size.  Patent main portal vein and hepatic veins.  Minimally complicated cyst in the left lobe  measuring  1.7 x 1.2 x 1.8 cm with minimal internal echoes.  2. Normal gallbladder, biliary tree, pancreas, spleen, kidneys, aorta and IVC.          Component      Latest Ref Rng 7/12/2024 8/27/2024   ALT (SGPT)      10 - 49 U/L 14  15    AST (SGOT)      <34 U/L 20  23    ALKALINE PHOSPHATASE      55 - 142 U/L 63  67    Total Bilirubin      0.2 - 1.1 mg/dL 0.6  0.6        Patient denies any GI symptoms of nausea, vomiting, dyspepsia, dysphagia, hematemesis, abdominal pain, change in bowel habits, thin stools, hematochezia, or melena.  Additionally there is no weight loss and no reported history of chest pain or shortness of breath.    Wt Readings from Last 10 Encounters:   12/11/24 164 lb (74.4 kg)   09/19/24 178 lb (80.7 kg)   09/18/24 178 lb (80.7 kg)   07/25/24 178 lb 6.4 oz (80.9 kg)   07/09/24 176 lb (79.8 kg)   02/26/24 177 lb (80.3 kg)   10/19/23 177 lb 9.6 oz (80.6 kg)   08/21/23 177 lb (80.3 kg)   07/24/23 177 lb (80.3 kg)   07/12/23 176 lb 6.4 oz (80 kg)     Family History:  No family history of liver disease    Prior endoscopies:  Colonoscopy 2019    Soc:  Tattoo clean and licensed  No IV drugs  Rare alcohol  Denies smoking-- quit 50 years ago    History, Medications, Allergies, ROS:      Past Medical History:    Allergic rhinitis    Asthma (HCC)    Basal cell carcinoma    excision- back    Basal cell carcinoma of chin    Wide excision lesion to Right chin BCC / VY flap    BCC (basal cell carcinoma)    Right chin    Breast cancer (HCC)    9/19/07(right breast lumpectomy with sentinel LN biopsy) 2.0 cm infiltrating poorly differentiated ductal     Cancer (HCC)    breast cancer and basal cell carcinoma    Cataract    Chickenpox    GERD (gastroesophageal reflux disease)    Measles    Migraines    occular migraines    Mumps    Osteoarthritis    Visual impairment    wears glasses      Past Surgical History:   Procedure Laterality Date    Biopsy of breast, needle core Right 09/11/2007 9/11/07 (core biopsy) Poorly  differentiated infiltrating ductal carcinoma R breast (triple negative, Ki   67,  86%)           Chemotherapy Right 2008    right breast    Colonoscopy      with biopsy    Colonoscopy N/A 2019    Procedure: COLONOSCOPY;  Surgeon: Chauncey Melissa MD;  Location: [facility] ENDOSCOPY    Lumpectomy right Right     and multiple sentinel lymph node excisions    Radiation right Right 2008    right breast      Family Hx:   Family History   Problem Relation Age of Onset    Lipids Father         hyperlipideia    Heart Disease Father         CAD    Stroke Father         TIAs    Dementia Father     Heart Attack Father         myocardial infarction    Thyroid disease Mother     Asthma Mother     Breast Cancer Mother 74    Cancer Mother     Other (Other) Mother     Other (copd) Mother         cause of death- passed at age 86    Alcohol and Other Disorders Associated Other         alcoholism grandmother    Skin cancer Sister         Basal cell carcinoma-chest    Cancer Sister     Breast Cancer Self 54    Lipids Brother     Ovarian Cancer Neg       Social History:   Social History     Socioeconomic History    Marital status:    Tobacco Use    Smoking status: Former     Current packs/day: 0.00     Average packs/day: 1 pack/day for 10.0 years (10.0 ttl pk-yrs)     Types: Cigarettes     Start date: 1971     Quit date: 1981     Years since quittin.9    Smokeless tobacco: Never   Vaping Use    Vaping status: Never Used   Substance and Sexual Activity    Alcohol use: Yes     Alcohol/week: 0.0 standard drinks of alcohol     Comment: social    Drug use: No     Comment: NONE    Sexual activity: Yes   Other Topics Concern    Caffeine Concern Yes     Comment: chocolate--Per NG no    Exercise No    History of tanning No    Pt has a pacemaker No    Pt has a defibrillator No    Reaction to local anesthetic No    Right Handed Yes    Currently spends a great deal of time in the sun No    Hx of Spending Great Deal  of Time in Sun Yes    Bad sunburns in the past Yes    Tanning Salons in the Past No    Hx of Radiation Treatments Yes        Medications (Active prior to today's visit):  Current Outpatient Medications   Medication Sig Dispense Refill    Cholecalciferol (VITAMIN D3) 25 MCG (1000 UT) Oral Cap       Estradiol 10 MCG Vaginal Tab Place 10 mcg vaginally twice a week. 24 tablet 0    Loratadine (CLARITIN OR) Take 10 mg by mouth daily.      montelukast 10 MG Oral Tab Take 1 tablet (10 mg total) by mouth nightly. 90 tablet 3    pantoprazole 40 MG Oral Tab EC TAKE 1 TABLET BY MOUTH IN  THE MORNING BEFORE  BREAKFAST 90 tablet 3    albuterol (PROAIR HFA) 108 (90 Base) MCG/ACT Inhalation Aero Soln Inhale 2 puffs into the lungs every 4 (four) hours as needed for Wheezing or Shortness of Breath (cough). 3 each 3    cyanocobalamine 1000 MCG Oral Tab Take 1 tablet by mouth three times per week      SALINE MIST SPRAY NA 2 sprays by Each Nare route 2 (two) times a day.      Melatonin 5 MG Oral Tab Take by mouth nightly.      PEG 3350 17 g Oral Powd Pack Take by mouth. Once per week      Olopatadine HCl 0.2 % Ophthalmic Solution 1 drop by Each eye route daily.        clobetasol 0.05 % External Ointment Apply 1 Application. topically As Directed. Apply thin layer to affected area twice a day for a week, then once a day for a week, then once every other day for a week (Patient not taking: Reported on 12/11/2024) 30 g 1    triamcinolone acetonide 0.1 % Mouth/Throat Paste Apply locally On canker sores 2-3 times daily for 3-4 days - as needed (Patient not taking: Reported on 12/11/2024) 1 each 0    Diclofenac Sodium 1 % External Gel Apply 1 g topically in the morning and 1 g before bedtime. (Patient not taking: Reported on 12/11/2024)      Vitamin D3 (VITAMIN D3) 2000 UNITS Oral Cap Take 1,000 Units by mouth daily.         Allergies:  Allergies[1]    ROS:   CONSTITUTIONAL:  negative for fevers, rigors  EYES:  negative for diplopia    RESPIRATORY:  negative for severe shortness of breath  CARDIOVASCULAR:  negative for crushing sub-sternal chest pain  GASTROINTESTINAL:  see HPI  GENITOURINARY:  negative for dysuria or gross hematuria  INTEGUMENT/BREAST:  SKIN:  negative for jaundice   ALLERGIC/IMMUNOLOGIC:  negative for hay fever  ENDOCRINE:  negative for cold intolerance and heat intolerance  MUSCULOSKELETAL:  negative for joint effusion/severe erythema  BEHAVIOR/PSYCH:  negative for psychotic behavior      PHYSICAL EXAM:   /77   Pulse 75   Ht 5' 3\" (1.6 m)   Wt 164 lb (74.4 kg)   BMI 29.05 kg/m²       Gen: Patient appears comfortable and in no acute discomfort  HEENT: the sclera appears anicteric, oropharynx clear, mucus membranes appear moist  CV:  the extremities are warm and well perfused   Lung: Moves air well; No labored breathing  Abdomen: soft, non-tender exam in all quadrants without rigidity or guarding, non-distended, no abnormal bowel sounds noted, no masses are palpated  Skin: No jaundice  Ext: no cyanosis, clubbing or edema is evident.   Neuro: Alert and interactive, and gross movements of extremities normal    Labs/Imaging:   Patient's labs and imaging were reviewed and discussed with patient today.      Recent Labs     06/12/22  0733 09/29/23  0753 07/12/24  0756   RBC 4.03 4.05 4.19   HGB 12.5 12.4 12.9   HCT 39.4 38.1 39.4   MCV 97.8 94.1 94.0   MCH 31.0 30.6 30.8   MCHC 31.7 32.5 32.7   RDW 12.9 13.0 12.9   NEPRELIM 2.47 2.84 2.93   WBC 4.9 5.3 5.5   .0 213.0 225.0     Lab Results   Component Value Date     (H) 08/27/2024    BUN 15 08/27/2024    BUNCREA 16.1 08/27/2024    CREATSERUM 0.93 08/27/2024    ANIONGAP 6 08/27/2024    GFRNAA 68 06/12/2022    GFRAA 78 06/12/2022    CA 10.4 08/27/2024    OSMOCALC 295 08/27/2024    ALKPHO 67 08/27/2024    AST 23 08/27/2024    ALT 15 08/27/2024    ALKPHOS 73 08/20/2016    BILT 0.6 08/27/2024    TP 7.8 08/27/2024    ALB 4.8 08/27/2024    GLOBULIN 3.0 08/27/2024     AGRATIO 1.3 08/20/2016     08/27/2024    K 4.5 08/27/2024     08/27/2024    CO2 28.0 08/27/2024     No results found for: \"PTP\", \"PT\", \"INR\"      ASSESSMENT/PLAN:   Maria Esther Foster is a 69 year old  female patient of Dr. Marinelli , with history of asthma, allergies, breast CA, migraines and reflux presenting for abnormal RUQ US.    1. Upper abdominal pain    2. Liver cyst    Recommend:  CT triple phase given history of breast CA and fatty liver  Colonoscopy due 5 years  Continue weight loss  Improved upper abdominal pain with diet and weight loss  Here are some reflux precautions:   - Avoid trigger foods  - Anti-reflux measures: raising the head of the bed at night, avoiding tight clothing or belts, avoiding eating late at night and not lying down shortly after mealtime and achieving weight loss   - Avoid NSAID's, caffeine, peppermints, alcohol, tobacco and foods that incite symptoms       Orders This Visit:  No orders of the defined types were placed in this encounter.      Meds This Visit:  Requested Prescriptions      No prescriptions requested or ordered in this encounter       Imaging & Referrals:  None       Chauncey Melissa MD  12/11/2024         [1]   Allergies  Allergen Reactions    Perfumes ASTHMA     Floral scents and pine,and stargazer lillies    Other RASH and FACE FLUSHING     Dial soap  Scented detergents    Adhesive Tape RASH    Cephalosporins ITCHING    Hydrocodone ITCHING    Duricef [Cefadroxil] ITCHING    Estradiol ITCHING    Methocarbamol RASH     Other name robaxin    Potato DIARRHEA     Potato skins    Sulfanilamide RASH

## 2024-12-16 ENCOUNTER — OFFICE VISIT (OUTPATIENT)
Dept: OBGYN CLINIC | Facility: CLINIC | Age: 69
End: 2024-12-16
Payer: COMMERCIAL

## 2024-12-16 VITALS
HEART RATE: 86 BPM | BODY MASS INDEX: 29.06 KG/M2 | DIASTOLIC BLOOD PRESSURE: 85 MMHG | HEIGHT: 63 IN | SYSTOLIC BLOOD PRESSURE: 124 MMHG | WEIGHT: 164 LBS

## 2024-12-16 DIAGNOSIS — L90.0 LICHEN SCLEROSUS: ICD-10-CM

## 2024-12-16 DIAGNOSIS — Z85.3 HISTORY OF BREAST CANCER: ICD-10-CM

## 2024-12-16 DIAGNOSIS — Z01.419 ENCOUNTER FOR GYNECOLOGICAL EXAMINATION: Primary | ICD-10-CM

## 2024-12-16 PROCEDURE — 1159F MED LIST DOCD IN RCRD: CPT | Performed by: OBSTETRICS & GYNECOLOGY

## 2024-12-16 PROCEDURE — 99213 OFFICE O/P EST LOW 20 MIN: CPT | Performed by: OBSTETRICS & GYNECOLOGY

## 2024-12-16 PROCEDURE — 3008F BODY MASS INDEX DOCD: CPT | Performed by: OBSTETRICS & GYNECOLOGY

## 2024-12-16 PROCEDURE — 3079F DIAST BP 80-89 MM HG: CPT | Performed by: OBSTETRICS & GYNECOLOGY

## 2024-12-16 PROCEDURE — 3074F SYST BP LT 130 MM HG: CPT | Performed by: OBSTETRICS & GYNECOLOGY

## 2024-12-16 PROCEDURE — 1160F RVW MEDS BY RX/DR IN RCRD: CPT | Performed by: OBSTETRICS & GYNECOLOGY

## 2024-12-16 RX ORDER — ESTRADIOL 10 UG/1
INSERT VAGINAL
Qty: 36 TABLET | Refills: 3 | Status: SHIPPED | OUTPATIENT
Start: 2024-12-16

## 2024-12-16 RX ORDER — CLOBETASOL PROPIONATE 0.5 MG/G
1 OINTMENT TOPICAL AS DIRECTED
Qty: 30 G | Refills: 2 | Status: SHIPPED | OUTPATIENT
Start: 2024-12-16

## 2024-12-17 NOTE — PROGRESS NOTES
Maria Esther Foster is a 69 year old female  No LMP recorded. Patient is postmenopausal. here for annual exam.       Last seen 10/19/2023.  Last pap 10/2023 normal with negative HPV    Last mammogram 2024.   History of breast cancer in .  Sees Dr Rogers    Has lichen sclerosus.  At last visit, lichen sclerosus resolved on exam.  Was going to use clobetasol as needed.  However, pt was using Aquaphor whenever she had itching instead of clobetasol cream.    Doing well with Vagifem 2x/week.  Symptoms better but still feels dryness and discomfort with intercourse    Diagnosed with liver cyst.      OBSTETRICS HISTORY:  OB History    Para Term  AB Living   3 2 2 0 1 2   SAB IAB Ectopic Multiple Live Births   1 0 0 0 2       GYNE HISTORY   Menarche: 12 YRS OLD   Use of Birth Control (if yes, specify type): Postmenopausal  Hx Prior Abnormal Pap: No  Pap Date: 10/19/23  Pap Result Notes: Pap neg, HPV Neg  Follow Up Recommendation: mammo 24 neg    MEDICAL HISTORY:  Past Medical History:    Allergic rhinitis    Asthma (HCC)    Basal cell carcinoma    excision- back    Basal cell carcinoma of chin    Wide excision lesion to Right chin BCC / VY flap    BCC (basal cell carcinoma)    Right chin    Breast cancer (HCC)    07(right breast lumpectomy with sentinel LN biopsy) 2.0 cm infiltrating poorly differentiated ductal     Cancer (HCC)    breast cancer and basal cell carcinoma    Cataract    Chickenpox    GERD (gastroesophageal reflux disease)    Measles    Migraines    occular migraines    Mumps    Osteoarthritis    Visual impairment    wears glasses     Past Surgical History:   Procedure Laterality Date    Biopsy of breast, needle core Right 2007 (core biopsy) Poorly differentiated infiltrating ductal carcinoma R breast (triple negative, Ki   67,  86%)           Chemotherapy Right     right breast    Colonoscopy      with biopsy    Colonoscopy N/A 2019     Procedure: COLONOSCOPY;  Surgeon: Chauncey Melissa MD;  Location: [facility] ENDOSCOPY    Lumpectomy right Right     and multiple sentinel lymph node excisions    Radiation right Right 2008    right breast       SOCIAL HISTORY:  Social History     Socioeconomic History    Marital status:    Tobacco Use    Smoking status: Former     Current packs/day: 0.00     Average packs/day: 1 pack/day for 10.0 years (10.0 ttl pk-yrs)     Types: Cigarettes     Start date: 1971     Quit date: 1981     Years since quittin.9    Smokeless tobacco: Never   Vaping Use    Vaping status: Never Used   Substance and Sexual Activity    Alcohol use: Yes     Alcohol/week: 0.0 standard drinks of alcohol     Comment: social    Drug use: No     Comment: NONE    Sexual activity: Yes   Other Topics Concern    Caffeine Concern Yes     Comment: chocolate--Per NG no    Exercise No    History of tanning No    Pt has a pacemaker No    Pt has a defibrillator No    Reaction to local anesthetic No    Right Handed Yes    Currently spends a great deal of time in the sun No    Hx of Spending Great Deal of Time in Sun Yes    Bad sunburns in the past Yes    Tanning Salons in the Past No    Hx of Radiation Treatments Yes       FAMILY HISTORY:  Family History   Problem Relation Age of Onset    Lipids Father         hyperlipideia    Heart Disease Father         CAD    Stroke Father         TIAs    Dementia Father     Heart Attack Father         myocardial infarction    Thyroid disease Mother     Asthma Mother     Breast Cancer Mother 74    Cancer Mother     Other (Other) Mother     Other (copd) Mother         cause of death- passed at age 86    Alcohol and Other Disorders Associated Other         alcoholism grandmother    Skin cancer Sister         Basal cell carcinoma-chest    Cancer Sister     Breast Cancer Self 54    Lipids Brother     Ovarian Cancer Neg        MEDICATIONS:  Current Outpatient Medications   Medication Sig Dispense Refill     Estradiol 10 MCG Vaginal Tab Insert vaginally 3x/ week. 36 tablet 3    clobetasol 0.05 % External Ointment Apply 1 Application  topically As Directed. Apply thin layer to affected area twice a day for a week, then once a day for a week, then once every other day for a week 30 g 2    Cholecalciferol (VITAMIN D3) 25 MCG (1000 UT) Oral Cap       Loratadine (CLARITIN OR) Take 10 mg by mouth daily.      montelukast 10 MG Oral Tab Take 1 tablet (10 mg total) by mouth nightly. 90 tablet 3    pantoprazole 40 MG Oral Tab EC TAKE 1 TABLET BY MOUTH IN  THE MORNING BEFORE  BREAKFAST 90 tablet 3    albuterol (PROAIR HFA) 108 (90 Base) MCG/ACT Inhalation Aero Soln Inhale 2 puffs into the lungs every 4 (four) hours as needed for Wheezing or Shortness of Breath (cough). 3 each 3    cyanocobalamine 1000 MCG Oral Tab Take 1 tablet by mouth three times per week      SALINE MIST SPRAY NA 2 sprays by Each Nare route 2 (two) times a day.      triamcinolone acetonide 0.1 % Mouth/Throat Paste Apply locally On canker sores 2-3 times daily for 3-4 days - as needed (Patient not taking: Reported on 12/11/2024) 1 each 0    Melatonin 5 MG Oral Tab Take by mouth nightly.      Diclofenac Sodium 1 % External Gel Apply 1 g topically in the morning and 1 g before bedtime. (Patient not taking: Reported on 12/11/2024)      PEG 3350 17 g Oral Powd Pack Take by mouth. Once per week      Olopatadine HCl 0.2 % Ophthalmic Solution 1 drop by Each eye route daily.           ALLERGIES:  Allergies[1]      Depression Screening (PHQ-2/PHQ-9): Over the LAST 2 WEEKS   Little interest or pleasure in doing things (over the last two weeks)?: Not at all    Feeling down, depressed, or hopeless (over the last two weeks)?: Not at all    PHQ-2 SCORE: 0           Review of Systems:  Constitutional:  Denies fatigue, night sweats, hot flashes  Eyes:  denies blurred or double vision  Cardiovascular:  denies chest pain or palpitations  Respiratory:  denies shortness of  breath  Gastrointestinal:  denies heartburn, abdominal pain, diarrhea or constipation  Genitourinary:  denies dysuria, incontinence, abnormal vaginal discharge, vaginal itching  Musculoskeletal:  denies back pain.  Skin/Breast:  Denies any breast pain, lumps, or discharge.   Neurological:  denies headaches, extremity weakness or numbness.  Psychiatric: denies depression or anxiety.  Endocrine:   denies excessive thirst or urination.  Heme/Lymph:  easy bruising or bleeding.    PHYSICAL EXAM:   /85   Pulse 86   Ht 5' 3\" (1.6 m)   Wt 164 lb (74.4 kg)   BMI 29.05 kg/m²   Wt Readings from Last 2 Encounters:   12/16/24 164 lb (74.4 kg)   12/11/24 164 lb (74.4 kg)     Body mass index is 29.05 kg/m².    Constitutional: well developed, well nourished  Neck/Thyroid: thyroid symmetric, no nodules  Heart:  Regular rate and rhythm  Lungs:  Clear to asculation  Breast: normal without palpable masses, tenderness, asymmetry, nipple discharge, nipple retraction or skin changes  Abdomen:  soft, nontender, nondistended, no masses  Psychiatric:  Oriented to time, place, person and situation. Appropriate mood and affect    Pelvic Exam:  External Genitalia: normal appearance, hair distribution, and no lesions  Generalized white epithelium with deeper white epithelium in perineum.  Urethral Meatus:  normal in size, location, without lesions and prolapse  Bladder:  No fullness, masses or tenderness  Vagina:  Normal appearance without lesions, no abnormal discharge  Cervix:  Normal without tenderness on motion  Uterus: normal in size, contour, position, mobility, without tenderness  Adnexa: normal without masses or tenderness  Perineum/anus: normal      Assessment & Plan:    Maria Esther Foster is a 69 year old female who presents for an annual physical exam.    1. Encounter for gynecological examination  Pap not done.  ASCCP guidelines reviewed.   Encouraged annual exam.   Refill Vagifem 10 mcg 3x/week to try to improve symptoms.    RTC 1 year or prn     2. History of breast cancer    3. Lichen sclerosus  Refill clobetasol cream bid when she has itching until resolved.  And then use as needed.        Requested Prescriptions     Signed Prescriptions Disp Refills    Estradiol 10 MCG Vaginal Tab 36 tablet 3     Sig: Insert vaginally 3x/ week.    clobetasol 0.05 % External Ointment 30 g 2     Sig: Apply 1 Application  topically As Directed. Apply thin layer to affected area twice a day for a week, then once a day for a week, then once every other day for a week         Chiara Oconnor MD  12/17/2024  10:03 AM         [1]   Allergies  Allergen Reactions    Perfumes ASTHMA     Floral scents and pine,and stargazer lillies    Other RASH and FACE FLUSHING     Dial soap  Scented detergents    Adhesive Tape RASH    Cephalosporins ITCHING    Hydrocodone ITCHING    Duricef [Cefadroxil] ITCHING    Estradiol ITCHING    Methocarbamol RASH     Other name robaxin    Potato DIARRHEA     Potato skins    Sulfanilamide RASH

## 2024-12-20 ENCOUNTER — HOSPITAL ENCOUNTER (OUTPATIENT)
Dept: CT IMAGING | Facility: HOSPITAL | Age: 69
Discharge: HOME | End: 2024-12-20
Attending: INTERNAL MEDICINE
Payer: MEDICARE

## 2024-12-20 DIAGNOSIS — K76.89 LIVER CYST: ICD-10-CM

## 2024-12-20 LAB
CREAT BLD-MCNC: 1 MG/DL
EGFRCR SERPLBLD CKD-EPI 2021: 61 ML/MIN/1.73M2 (ref 60–?)

## 2024-12-20 PROCEDURE — 74170 CT ABD WO CNTRST FLWD CNTRST: CPT | Performed by: INTERNAL MEDICINE

## 2024-12-20 PROCEDURE — 82565 ASSAY OF CREATININE: CPT

## 2024-12-29 ENCOUNTER — PATIENT MESSAGE (OUTPATIENT)
Dept: GASTROENTEROLOGY | Facility: CLINIC | Age: 69
End: 2024-12-29

## 2025-01-08 ENCOUNTER — TELEPHONE (OUTPATIENT)
Facility: CLINIC | Age: 70
End: 2025-01-08

## 2025-01-09 NOTE — TELEPHONE ENCOUNTER
Recall CT in 1 year per Dr. Melissa. CT done on 12/20/24.     Message sent to pt outreach.       Chauncey Melissa MD Physician Signed 12:24 PM     Copy     Please put in recall triple phase CT in 1 year

## 2025-03-18 DIAGNOSIS — K21.9 GASTROESOPHAGEAL REFLUX DISEASE WITHOUT ESOPHAGITIS: ICD-10-CM

## 2025-03-19 DIAGNOSIS — K21.9 GASTROESOPHAGEAL REFLUX DISEASE WITHOUT ESOPHAGITIS: ICD-10-CM

## 2025-03-21 RX ORDER — PANTOPRAZOLE SODIUM 40 MG/1
TABLET, DELAYED RELEASE ORAL
Qty: 90 TABLET | Refills: 3 | Status: SHIPPED | OUTPATIENT
Start: 2025-03-21

## 2025-03-24 RX ORDER — PANTOPRAZOLE SODIUM 40 MG/1
TABLET, DELAYED RELEASE ORAL
Qty: 90 TABLET | Refills: 3 | OUTPATIENT
Start: 2025-03-24

## 2025-04-07 NOTE — TELEPHONE ENCOUNTER
From: Lew Nino  To: TISHA Su  Sent: 7/15/2022 10:24 AM CDT  Subject: using the steroid cream    Hi Vielka Diaz,    Thank you for your kindness and gentleness during my procedure. You made an uncomfortable (painful!) situation bearable.   Although I'm still waiting for the incision to heal completely before using the steroid cream, I was wondering if my  and I will be able to resume sexual contact while I'm using the cream.    Thanks,  Param Walsh pt resides alone, d/c plan to daughters home no stairs to enter, 2 stairs to sunken living area. pt has RW and SAC, no use PTA.

## 2025-05-08 ENCOUNTER — PATIENT MESSAGE (OUTPATIENT)
Dept: ENDOCRINOLOGY CLINIC | Facility: CLINIC | Age: 70
End: 2025-05-08

## 2025-05-08 DIAGNOSIS — M81.0 AGE-RELATED OSTEOPOROSIS WITHOUT CURRENT PATHOLOGICAL FRACTURE: ICD-10-CM

## 2025-05-08 DIAGNOSIS — E55.9 VITAMIN D DEFICIENCY: Primary | ICD-10-CM

## 2025-05-10 ENCOUNTER — LAB ENCOUNTER (OUTPATIENT)
Dept: LAB | Facility: HOSPITAL | Age: 70
End: 2025-05-10
Attending: INTERNAL MEDICINE
Payer: MEDICARE

## 2025-05-10 DIAGNOSIS — M81.0 AGE-RELATED OSTEOPOROSIS WITHOUT CURRENT PATHOLOGICAL FRACTURE: Primary | ICD-10-CM

## 2025-05-10 DIAGNOSIS — E55.9 VITAMIN D DEFICIENCY: ICD-10-CM

## 2025-05-10 LAB
CALCIUM BLD-MCNC: 9.6 MG/DL (ref 8.7–10.4)
CREAT BLD-MCNC: 0.94 MG/DL (ref 0.55–1.02)
PHOSPHATE SERPL-MCNC: 4.2 MG/DL (ref 2.4–5.1)
PTH-INTACT SERPL-MCNC: 54 PG/ML (ref 18.5–88)
VIT D+METAB SERPL-MCNC: 51.6 NG/ML (ref 30–100)

## 2025-05-10 PROCEDURE — 36415 COLL VENOUS BLD VENIPUNCTURE: CPT | Performed by: INTERNAL MEDICINE

## 2025-05-10 PROCEDURE — 82310 ASSAY OF CALCIUM: CPT | Performed by: INTERNAL MEDICINE

## 2025-05-10 PROCEDURE — 82565 ASSAY OF CREATININE: CPT | Performed by: INTERNAL MEDICINE

## 2025-05-10 PROCEDURE — 83970 ASSAY OF PARATHORMONE: CPT | Performed by: INTERNAL MEDICINE

## 2025-05-10 PROCEDURE — 84100 ASSAY OF PHOSPHORUS: CPT | Performed by: INTERNAL MEDICINE

## 2025-05-10 PROCEDURE — 82306 VITAMIN D 25 HYDROXY: CPT

## 2025-05-10 PROCEDURE — 84080 ASSAY ALKALINE PHOSPHATASES: CPT

## 2025-05-13 ENCOUNTER — OFFICE VISIT (OUTPATIENT)
Dept: ENDOCRINOLOGY CLINIC | Facility: CLINIC | Age: 70
End: 2025-05-13
Payer: COMMERCIAL

## 2025-05-13 VITALS
HEART RATE: 84 BPM | DIASTOLIC BLOOD PRESSURE: 72 MMHG | HEIGHT: 63 IN | SYSTOLIC BLOOD PRESSURE: 127 MMHG | WEIGHT: 153 LBS | BODY MASS INDEX: 27.11 KG/M2

## 2025-05-13 DIAGNOSIS — E55.9 VITAMIN D DEFICIENCY: ICD-10-CM

## 2025-05-13 DIAGNOSIS — M81.0 AGE-RELATED OSTEOPOROSIS WITHOUT CURRENT PATHOLOGICAL FRACTURE: Primary | ICD-10-CM

## 2025-05-13 PROCEDURE — 3074F SYST BP LT 130 MM HG: CPT | Performed by: INTERNAL MEDICINE

## 2025-05-13 PROCEDURE — 1159F MED LIST DOCD IN RCRD: CPT | Performed by: INTERNAL MEDICINE

## 2025-05-13 PROCEDURE — 1160F RVW MEDS BY RX/DR IN RCRD: CPT | Performed by: INTERNAL MEDICINE

## 2025-05-13 PROCEDURE — 99213 OFFICE O/P EST LOW 20 MIN: CPT | Performed by: INTERNAL MEDICINE

## 2025-05-13 PROCEDURE — 3078F DIAST BP <80 MM HG: CPT | Performed by: INTERNAL MEDICINE

## 2025-05-13 PROCEDURE — 3008F BODY MASS INDEX DOCD: CPT | Performed by: INTERNAL MEDICINE

## 2025-05-13 NOTE — PROGRESS NOTES
5/31/2024      Dear Darren,    There’s no question about it - preventive care can save lives. Many health problems start out silently without symptoms. Preventive care is often the only way to catch these problems in early stages, when they can be more successfully treated.     Our records show that you are due for the screening(s) listed below. If you have completed these screening(s), please call us so we can update your record.    Screening Colonoscopy   Colonoscopy: Colorectal cancer usually comes from polyps (abnormal growths) in the colon or rectum. Colonoscopy can find the polyps and remove them before they turn to cancer. To schedule your Colonoscopy or to discuss other screening options, call [phone].    Your health is important to us. Please feel free to call my office at [phone] or make an appointment to discuss the best screening options for you.     Sincerely,      Karie Glover MD   [facility]  [address] [address]  Dept: [phone]  Dept Fax: [phone]     Enclosures:    Colorectal Cancer Screening  Colorectal cancer starts in cells in the colon or rectum. It's 1 of the main causes of cancer deaths in the [place] But when it's found and treated early, the chances of a full recovery are very good. It needs to be found when it's still small and hasn't spread. This cancer rarely causes symptoms in its early stages. Because of this, screening for it is important. This means looking for signs of the cancer before you have symptoms. Screening is even more important if you have risk factors for this cancer.   Risk factors for colorectal cancer  Your risk of having colorectal cancer is higher if you:   Are age 50 or older, but it can start in people younger than 50  Have a family history or personal history of colorectal cancer or polyps  Are   Are of Eastern  Christian descent (Ashkenazi)  Have type 2 diabetes, Crohn’s disease,  Return Office Visit     CHIEF COMPLAINT:    Osteoporosis     HISTORY OF PRESENT ILLNESS:  Maria Esther Foster is a 70 year old female who presents for follow up for Osteoporosis.     She was diagnosed with osteoporosis in 2019 on screening DXA.   Has a h/o right side beast cancer, is s/p lumpectomy, radiation and chemo. Hormone -ve.   She is not on hormonal therapy.       Calcium and Vit d intake: Vit D 1000 units daily  Steroid use,  PPI use, chronic pain medication use, Anti-epileptics use :No  RA: no  Exercise: no  She is postmenopausal , in 2007 after chemo  Maternal history of osteoporosis No  History of fractures:  No  History of kidney stones No     Current Smoking No   Alcohol occasional   History of thyroid disease No   History of calcium problems: No   History of Malabsorption/ bariatric surgery: No        She is here for a FU visit  Doing well on prolia  No fractures,no falls    She is s/p 9 injections of prolia; reclast in Sep 2024   Dental work: crowns in May 2025 , has some work left , plans to complete this month, dental is aware of her being on reclast     CURRENT MEDICATION:    Current Outpatient Medications   Medication Sig Dispense Refill    pantoprazole 40 MG Oral Tab EC TAKE 1 TABLET BY MOUTH IN  THE MORNING BEFORE  BREAKFAST 90 tablet 3    Estradiol 10 MCG Vaginal Tab Insert vaginally 3x/ week. 36 tablet 3    clobetasol 0.05 % External Ointment Apply 1 Application  topically As Directed. Apply thin layer to affected area twice a day for a week, then once a day for a week, then once every other day for a week 30 g 2    Cholecalciferol (VITAMIN D3) 25 MCG (1000 UT) Oral Cap       Loratadine (CLARITIN OR) Take 10 mg by mouth daily.      montelukast 10 MG Oral Tab Take 1 tablet (10 mg total) by mouth nightly. 90 tablet 3    albuterol (PROAIR HFA) 108 (90 Base) MCG/ACT Inhalation Aero Soln Inhale 2 puffs into the lungs every 4 (four) hours as needed for Wheezing or Shortness of Breath (cough). 3 each 3     cyanocobalamine 1000 MCG Oral Tab Take 1 tablet by mouth three times per week      SALINE MIST SPRAY NA 2 sprays by Each Nare route 2 (two) times a day.      Melatonin 5 MG Oral Tab Take by mouth nightly.      PEG 3350 17 g Oral Powd Pack Take by mouth. Once per week      Olopatadine HCl 0.2 % Ophthalmic Solution 1 drop by Each eye route daily.        triamcinolone acetonide 0.1 % Mouth/Throat Paste Apply locally On canker sores 2-3 times daily for 3-4 days - as needed (Patient not taking: Reported on 5/13/2025) 1 each 0    Diclofenac Sodium 1 % External Gel Apply 1 g topically in the morning and 1 g before bedtime. (Patient not taking: Reported on 5/13/2025)           ALLERGY:  Allergies   Allergen Reactions    Perfumes ASTHMA     Floral scents and pine,and stargazer lillies    Other RASH and FACE FLUSHING     Dial soap  Scented detergents    Adhesive Tape RASH    Cephalosporins ITCHING    Hydrocodone ITCHING    Duricef [Cefadroxil] ITCHING    Estradiol ITCHING    Methocarbamol RASH     Other name robaxin    Potato DIARRHEA     Potato skins    Sulfanilamide RASH       PAST MEDICAL, SOCIAL AND FAMILY HISTORY:  See past medical history marked as reviewed.  See past surgical history marked as reviewed.  See past family history marked as reviewed.  See past social history marked as reviewed.    ASSESSMENTS:     REVIEW OF SYSTEMS:  Constitutional: Negative for: weight change, fever, fatigue, cold/heat intolerance  Eyes: Negative for:  Visual changes, proptosis, blurring  ENT: Negative for:  dysphagia, neck swelling, dysphonia  Respiratory: Negative for:  dyspnea, cough  Cardiovascular: Negative for:  chest pain, palpitations, orthopnea  GI: Negative for:  abdominal pain, nausea, vomiting, diarrhea, constipation, bleeding  Neurology: Negative for: headache, numbness, weakness  Genito-Urinary: Negative for: dysuria, frequency  Psychiatric: Negative for:  depression, anxiety  Hematology/Lymphatics: Negative for:  or ulcerative colitis  Have an inherited genetic syndrome like Massey syndrome (HNPCC) or familial adenomatous polyposis (FAP)  Are overweight  Are not physically active  Smoke  Drink a lot of alcohol (more than 2 drinks per day for men and 1 drink per day for women)  Eat a lot of red or processed meat  The colon and rectum  The colon and rectum are part of your digestive system. Food goes from your stomach to your small intestine. It then goes into your colon. As it travels through the colon, water is removed. The waste that is left (stool) becomes more solid. The muscles of your intestines push the stool toward the sigmoid colon. This is the last part of the colon. The stool then moves into the rectum. It's stored there until it’s ready to leave your body when you poop.   How colorectal cancer starts  Polyps are growths that can form on the inner lining of the colon and rectum. Most are benign. This means they aren’t cancer. But over time, some polyps can become cancer. These are called malignant. This happens when cells in these polyps start to grow out of control. In time, the cancer cells can spread to more of the colon and rectum. The cancer can spread to nearby organs or lymph nodes. It can spread to other parts of the body, like the liver or lungs. Finding and removing polyps early can help keep cancer from starting.   Colorectal cancer screening  Screening means looking for a health problem before you have symptoms. Screening for colorectal cancer starts with:   Your health history.Your healthcare provider will ask about your health history. They will ask you about possible cancer risk factors. Tell your healthcare provider if you have a family member who has had colorectal cancer or polyps. Tell them about any health problems you have had in the past.  Physical exam. This includes a digital rectal exam (COLTON). A COLTON might be done as part of your physical exam. To do it, your healthcare provider puts a  bruising, lower extremity edema  Endocrine: Negative for: polyuria, polydypsia  Skin: Negative for: rash, blister, cellulitis,       PHYSICAL EXAM:   Vitals:    05/13/25 0907   BP: 127/72   Pulse: 84   Weight: 153 lb (69.4 kg)   Height: 5' 3\" (1.6 m)       BMI: Body mass index is 27.1 kg/m².         General Appearance:  alert, well developed, in no acute distress  Head: Atraumatic  Eyes:  normal conjunctivae, sclera., normal sclera and normal pupils  Throat/Neck: normal sound to voice. Normal hearing, normal speech  Respiratory:  Speaking in full sentences, non-labored. no increased work of breathing, no audible wheezing    Neuro: motor grossly intact, moving all extremities without difficulty  Psychiatric:  oriented to time, self, and place  Extremities: no obvious extremity swelling, no lesions        DATA:     Pertinent data reviewed             DXA 2023:  Compared to the prior study, bone mineral density has increased in the lumbar spine and left hip.     ASSESSMENT AND PLAN:    Patient is a 70 year old female with Osteoporosis.   She has acid reflux and hence oral bisphosphonates are not an ideal option.  S/p 9 injections of prolia, last injection in Feb 2025   Received reclast Sep 2024  DXA 2023:  Compared to the prior study, bone mineral density has increased in the lumbar spine and left hip.       PLAN:   We disucssed two options:   > getting reclast in Sep 2025 and then doing DXA after 10/10/2025   > holding off on reclast in Sep 2025 and deciding further Rx based on DXA results    - Labs reviewed, BS alk phos in process   - Discussed dietary intake of calcium  - Resistance exercises  - Fall precautions.        RTC in 6 months with me.       Patient verbalized a complete  understanding of all of the above and did not have any further questions.                    Marisol Leija MD                 lubricated, gloved finger into your rectum. They check for any lumps or changes that could be cancer. This doesn't hurt and takes less than a minute. COLTON alone is not enough to screen for colorectal cancer. You'll also need 1 of the tests listed below.  Types of screening tests  The American Cancer Society and the [place] Preventive Services Task Force advise colorectal cancer screening for people at average risk starting at age 45. Talk with your healthcare provider about your risks. Ask when you should start screening tests. It's also important to check with your health insurer about your coverage.   Below are the most common types of colorectal cancer screening tests. How often you should be screened depends on your risk and the test that you and your healthcare provider choose. If you have a family history of colon cancer or are at high risk for other reasons, you may need to have screening earlier or more often.   Stool testing   Fecal occult blood test (FOBT) or fecal immunochemical test (FIT) (every 1 year)   These tests check for blood in stool that you can’t see. This is called hidden or occult blood. Hidden blood may be a sign of colon polyps or cancer. A small sample of stool is sent to a lab where it's tested for blood. Most often, you collect this sample at home using a kit your healthcare provider gives you. Make sure you know what to do and follow the instructions carefully. For example, you might need to not eat certain foods and not take some medicines before collecting stool for this test.   Stool DNA test (every 1 to 3 years)  This test looks for cells in your stool that have changed DNA in them. These DNA changes might be signs of cancer or polyps. This test also looks for hidden blood in stool. For this test, you collect an entire bowel movement. This is done using a container that's put in the toilet. The kit has instructions on how to collect, prepare, and send your stool. It goes to a lab for  testing.   Visual exams  Colonoscopy (every 10 years)  This test allows your healthcare provider to find and remove polyps in your colon or rectum. It is the only screening test that lets your healthcare provider see your entire colon and rectum. This test lets your healthcare provider remove any pieces of tissue that need to be checked for cancer. If you have an abnormal result from any other colorectal cancer screening test, you will likely need a colonoscopy.   One or 2 days before the test, you'll do a bowel prep. The bowel prep cleans out your colon. This is so the lining can be seen during the test. You'll be given instructions on how to do the prep. It will include a liquid diet. You will then use a strong laxative solution or an enema.   Just before the test, you're given medicine to make you sleepy. Then the healthcare provider gently puts a long, flexible, lighted tube (colonoscope) into your rectum. The scope is guided through your entire colon. The provider looks at images of the inside of your colon on a video screen. Any polyps seen are removed. They are sent to a lab for testing. If a polyp can’t be removed, a small piece of it is taken out for testing. If the tests show it might be cancer, the polyp might be removed later during surgery.   Flexible sigmoidoscopy (every 5 years)  This test is a lot like a colonoscopy. But it is done only on the sigmoid colon and rectum. The sigmoid colon is the last 2 feet or so that connects to your rectum. The entire colon is about 5 feet long.   One or 2 days before the test, you'll do a bowel prep. The bowel prep cleans out your colon. This is so the lining can be seen during the test. You'll be given instructions on how to do the prep. It will include a liquid diet. You will then use a strong laxative solution or an enema.   You are awake during the test. But you may be given medicine to help you relax. The healthcare provider guides a thin, flexible, lighted  tube (sigmoidoscope) into your rectum and lower colon. The images are shown on a video screen. Polyps can be removed. They are sent to a lab for testing.   Another option is flexible sigmoidoscopy every 10 years, with a FIT stool test every 1 year. Talk with your healthcare provider to learn more.   Virtual colonoscopy (every 5 years)  This test is also called a CT colonography. It uses a series of X-rays. They make a 3-D image of your colon and rectum.   One or 2 days before the test, you'll do a bowel prep. The bowel prep cleans out your colon. This is so the lining can be seen during the test. You'll be given instructions on how to do the prep. It will include a liquid diet. You will then use a strong laxative solution or an enema. The day before the test, you'll need to do a bowel prep to clean out your colon. Your healthcare provider will give you instructions on how to do this.   During the test, you'll lie on a narrow table that's part of an X-ray machine called a CT scanner. A soft, small tube will be placed into your rectum. This will fill your colon and rectum with air. The table will slide into the CT scanner. A series of X-rays will be taken. A computer will combine these to create a 3-D image. Because the test uses X-rays, it exposes you to a small amount of radiation. This test can be done without sedation. If polyps or any other changes are seen, you'll need a colonoscopy. This is done so the tissue can be removed for testing.   Talking with your healthcare provider  Talk with your healthcare provider about which screening tests might be best for you. Each test has pros and cons. But no matter which test you have, the most important thing is that you get screened. If cancer is found at an early stage during screening, it's easier to treat. And treatment is more likely to work well. Cancer can even be prevented with routine screening tests.   If you have a screening test other than a colonoscopy and  have an abnormal test result, you'll need to follow-up with colonoscopy. This would not be considered a screening colonoscopy. Your deductible and co-pay may apply. Check with your health insurer so you know what to expect.   Ask your provider about your level of risk. You may need to be screened on a different schedule if you are at higher risk of this cancer. Talk with your provider about your health history to decide on the screening plan that's best for you.   [place] last reviewed this educational content on 8/1/2020 © 2000-2022 [employer]. All rights reserved. This information is not intended as a substitute for professional medical care. Always follow your healthcare professional's instructions.

## 2025-05-14 LAB — ALKALINE PHOSPHATASE BONE SPECIFIC: 20.9 UG/L

## 2025-05-29 RX ORDER — MONTELUKAST SODIUM 10 MG/1
10 TABLET ORAL NIGHTLY
Qty: 90 TABLET | Refills: 3 | Status: SHIPPED | OUTPATIENT
Start: 2025-05-29

## 2025-06-21 DIAGNOSIS — J45.20 MILD INTERMITTENT ASTHMA, UNSPECIFIED WHETHER COMPLICATED (HCC): ICD-10-CM

## 2025-06-24 RX ORDER — ALBUTEROL SULFATE 90 UG/1
2 INHALANT RESPIRATORY (INHALATION) EVERY 4 HOURS PRN
Qty: 3 EACH | Refills: 0 | Status: SHIPPED | OUTPATIENT
Start: 2025-06-24

## 2025-06-24 NOTE — TELEPHONE ENCOUNTER
Please review; protocol failed/No Protocol      Last Office Visit: 09/19/2024  Future Appointments   Date Time Provider Department Center   9/23/2025  8:20 AM Wes Marinelli MD [address] [place]

## 2025-06-25 ENCOUNTER — PATIENT MESSAGE (OUTPATIENT)
Dept: INTERNAL MEDICINE CLINIC | Facility: CLINIC | Age: 70
End: 2025-06-25

## 2025-07-03 ENCOUNTER — HOSPITAL ENCOUNTER (OUTPATIENT)
Dept: MAMMOGRAPHY | Age: 70
Discharge: HOME | End: 2025-07-03
Attending: INTERNAL MEDICINE
Payer: MEDICARE

## 2025-07-03 DIAGNOSIS — Z12.31 SCREENING MAMMOGRAM FOR BREAST CANCER: ICD-10-CM

## 2025-07-03 PROCEDURE — 77067 SCR MAMMO BI INCL CAD: CPT | Performed by: INTERNAL MEDICINE

## 2025-07-03 PROCEDURE — 77063 BREAST TOMOSYNTHESIS BI: CPT | Performed by: INTERNAL MEDICINE

## 2025-07-28 ENCOUNTER — OFFICE VISIT (OUTPATIENT)
Facility: LOCATION | Age: 70
End: 2025-07-28
Attending: INTERNAL MEDICINE
Payer: MEDICARE

## 2025-07-28 VITALS
OXYGEN SATURATION: 97 % | RESPIRATION RATE: 18 BRPM | DIASTOLIC BLOOD PRESSURE: 64 MMHG | TEMPERATURE: 98 F | BODY MASS INDEX: 26.4 KG/M2 | HEART RATE: 62 BPM | SYSTOLIC BLOOD PRESSURE: 123 MMHG | HEIGHT: 63 IN | WEIGHT: 149 LBS

## 2025-07-28 DIAGNOSIS — Z08 ENCOUNTER FOR FOLLOW-UP SURVEILLANCE OF BREAST CANCER: ICD-10-CM

## 2025-07-28 DIAGNOSIS — Z85.3 HISTORY OF RIGHT BREAST CANCER: Primary | ICD-10-CM

## 2025-07-28 DIAGNOSIS — Z12.31 SCREENING MAMMOGRAM FOR BREAST CANCER: ICD-10-CM

## 2025-07-28 DIAGNOSIS — Z85.3 ENCOUNTER FOR FOLLOW-UP SURVEILLANCE OF BREAST CANCER: ICD-10-CM

## 2025-07-28 NOTE — PROGRESS NOTES
HPI   Maria Esther Foster is a 70 year old female here for f/u of   Encounter Diagnoses   Name Primary?    History of right breast cancer Yes    Encounter for follow-up surveillance of breast cancer     Screening mammogram for breast cancer         Denies changes on the breasts.      No swelling of the R arm.  Doing exercises.    Weight is down.  She has been on a diet due to dx of fatty liver and has lost weight on diet. Doing lifestyle changes.     Going for a walk every morning or biking.        ECOG  PS 0.    Review of Systems:     Review of Systems   Constitutional:  Negative for appetite change, fatigue and unexpected weight change.   Respiratory:  Positive for cough (due to post nasal gtt.). Negative for shortness of breath.    Cardiovascular:  Negative for chest pain.   Gastrointestinal:  Negative for abdominal pain.   Musculoskeletal:  Positive for arthralgias (B knees.) and back pain (if stands too long). Negative for neck pain.        No bone pain.  Back and knee pain better from weight loss   Neurological:  Negative for dizziness and headaches.   Hematological:  Negative for adenopathy.   Psychiatric/Behavioral:  Negative for sleep disturbance.            Current Outpatient Medications   Medication Sig Dispense Refill    albuterol 108 (90 Base) MCG/ACT Inhalation Aero Soln Inhale 2 puffs into the lungs every 4 (four) hours as needed for Wheezing. 3 each 0    montelukast 10 MG Oral Tab Take 1 tablet (10 mg total) by mouth nightly. 90 tablet 3    pantoprazole 40 MG Oral Tab EC TAKE 1 TABLET BY MOUTH IN  THE MORNING BEFORE  BREAKFAST 90 tablet 3    Estradiol 10 MCG Vaginal Tab Insert vaginally 3x/ week. 36 tablet 3    clobetasol 0.05 % External Ointment Apply 1 Application  topically As Directed. Apply thin layer to affected area twice a day for a week, then once a day for a week, then once every other day for a week 30 g 2    Cholecalciferol (VITAMIN D3) 25 MCG (1000 UT) Oral Cap       Loratadine (CLARITIN  OR) Take 10 mg by mouth in the morning.      cyanocobalamine 1000 MCG Oral Tab Take 1 tablet by mouth three times per week      SALINE MIST SPRAY NA 2 sprays by Each Nare route in the morning and 2 sprays before bedtime.      triamcinolone acetonide 0.1 % Mouth/Throat Paste Apply locally On canker sores 2-3 times daily for 3-4 days - as needed 1 each 0    Melatonin 5 MG Oral Tab Take by mouth nightly.      Diclofenac Sodium 1 % External Gel Apply 1 g topically in the morning and 1 g before bedtime.      PEG 3350 17 g Oral Powd Pack Take by mouth. Once per week      Olopatadine HCl 0.2 % Ophthalmic Solution 1 drop by Each eye route in the morning.       Allergies:   Allergies   Allergen Reactions    Perfumes ASTHMA     Floral scents and pine,and stargazer lillies    Other RASH and FACE FLUSHING     Dial soap  Scented detergents    Adhesive Tape RASH    Cephalosporins ITCHING    Hydrocodone ITCHING    Duricef [Cefadroxil] ITCHING    Estradiol ITCHING    Methocarbamol RASH     Other name robaxin    Potato DIARRHEA     Potato skins    Sulfanilamide RASH       Past Medical History:    Allergic rhinitis    Asthma (HCC)    Basal cell carcinoma    excision- back    Basal cell carcinoma of chin    Wide excision lesion to Right chin BCC / VY flap    BCC (basal cell carcinoma)    Right chin    Breast cancer (HCC)    07(right breast lumpectomy with sentinel LN biopsy) 2.0 cm infiltrating poorly differentiated ductal     Cancer (HCC)    breast cancer and basal cell carcinoma    Cataract    Chickenpox    GERD (gastroesophageal reflux disease)    Measles    Migraines    occular migraines    Mumps    Osteoarthritis    Visual impairment    wears glasses     Past Surgical History:   Procedure Laterality Date    Biopsy of breast, needle core Right 2007 (core biopsy) Poorly differentiated infiltrating ductal carcinoma R breast (triple negative, Ki   67,  86%)           Chemotherapy Right     right  breast    Colonoscopy      with biopsy    Colonoscopy N/A 2019    Procedure: COLONOSCOPY;  Surgeon: Chauncey Melissa MD;  Location: [facility]    Lumpectomy right Right     and multiple sentinel lymph node excisions    Radiation right Right 2008    right breast     Social History     Socioeconomic History    Marital status:    Tobacco Use    Smoking status: Former     Current packs/day: 0.00     Average packs/day: 1 pack/day for 10.0 years (10.0 ttl pk-yrs)     Types: Cigarettes     Start date: 1971     Quit date: 1981     Years since quittin.6    Smokeless tobacco: Never   Vaping Use    Vaping status: Never Used   Substance and Sexual Activity    Alcohol use: Yes     Alcohol/week: 0.0 standard drinks of alcohol     Comment: social    Drug use: No     Comment: NONE    Sexual activity: Yes   Other Topics Concern    Caffeine Concern Yes     Comment: chocolate--Per NG no    Exercise No    History of tanning No    Pt has a pacemaker No    Pt has a defibrillator No    Reaction to local anesthetic No    Right Handed Yes    Currently spends a great deal of time in the sun No    Hx of Spending Great Deal of Time in Sun Yes    Bad sunburns in the past Yes    Tanning Salons in the Past No    Hx of Radiation Treatments Yes         Family History   Problem Relation Age of Onset    Breast Cancer Self 54    Thyroid disease Mother     Asthma Mother     Breast Cancer Mother 74    Other (Other) Mother     Other (copd) Mother         cause of death- passed at age 86    Lipids Father         hyperlipideia    Heart Disease Father         CAD    Stroke Father         TIAs    Dementia Father     Heart Attack Father         myocardial infarction    Skin cancer Sister         Basal cell carcinoma-chest    Cancer Sister     Lipids Brother     Alcohol and Other Disorders Associated Other         alcoholism grandmother    Ovarian Cancer Neg          PHYSICAL EXAM:    /64 (BP Location: Left arm, Patient  Position: Sitting, Cuff Size: adult)   Pulse 62   Temp 98.2 °F (36.8 °C) (Oral)   Resp 18   Ht 1.6 m (5' 3\")   Wt 67.6 kg (149 lb)   SpO2 97%   BMI 26.39 kg/m²   Wt Readings from Last 6 Encounters:   07/28/25 67.6 kg (149 lb)   05/13/25 69.4 kg (153 lb)   12/16/24 74.4 kg (164 lb)   12/11/24 74.4 kg (164 lb)   09/19/24 80.7 kg (178 lb)   09/18/24 80.7 kg (178 lb)     General: Patient is alert, not in acute distress.  HEENT: EOMs intact. PERRL.   Neck: No JVD. No palpable lymphadenopathy. Neck is supple.  Chest: Clear to auscultation.  Breasts: R breast with surgical changes, no masses.  L breast no masses.   Heart: Regular rate and rhythm.   Abdomen: Soft, non tender with good bowel sounds.  Extremities: No edema.  Neurological: Grossly intact.   Lymphatics: There is no palpable lymphadenopathy throughout in the cervical, supraclavicular, axillary, or inguinal regions.  Psych/Depression: nl        ASSESSMENT/PLAN:     Encounter Diagnoses   Name Primary?    History of right breast cancer Yes    Encounter for follow-up surveillance of breast cancer     Screening mammogram for breast cancer         Tomosynthesis on July 3, 2025 was BIRADS-2.  Due in 1 year, order given.    Monthly SBE.    Yearly H+P and yearly mammogram.    F/u with PCP yearly.    No orders of the defined types were placed in this encounter.      Results From Past 48 Hours:  No results found for this or any previous visit (from the past 48 hours).    MDM low.    Imaging & Referrals:  [facility] KULWANT 2D+3D SCREENING BILAT (CPT=77067/17430)   No orders of the defined types were placed in this encounter.    PROCEDURE: [facility] KULWANT 2D+3D SCREENING BILAT (CPT=77067/98094)    INDICATIONS: DX-Z12.31 Screening mammogram for breast cancer;    PATIENT STATED HISTORY: No current complaints. Right lumpectomy with radiation and chemotherapy in 2007/2008.    COMPARISON: 7/1/2024, 6/27/2023, 6/20/2020    TECHNIQUE: Full field direct screening mammography was performed  and images were reviewed with the PPDai NATHAN 1.5.1.5 CAD device. 3D Tomosynthesis was performed and reviewed.    BREAST COMPOSITION: Category c: The breasts are heterogeneously dense, which may obscure small masses.  Because of breast density, this patient may benefit from supplemental screening with breast MRI or bilateral whole breast ultrasound for increased sensitivity for detection of cancer which can be obscured mammographically.  Please contact your ordering  provider to discuss supplemental screening options.    FINDINGS: , Postsurgical distortion and dense benign-appearing calcifications are stable at the lumpectomy site in the right upper outer posterior breast. Focally dense tissue in the right inner breast at mid depth is stable. No suspicious masses,  calcifications, or areas of distortion are seen.  Impression  CONCLUSION: No mammographic signs of malignancy. Stable posttreatment changes in the right breast.     BI-RADS CATEGORY: 2: Benign      LATERALITY: Bilateral    RECOMMENDATION: Normal Screening Interval      PLEASE NOTE: NORMAL MAMMOGRAM DOES NOT EXCLUDE THE POSSIBILITY OF BREAST CANCER. A CLINICALLY SUSPICIOUS PALPABLE LUMP SHOULD BE BIOPSIED.    For patients over the age of 40, the target due date for the patient's next mammogram has been entered into a reminder system.    Patient received a discharge summary from the technologist after completion of exam.    Breast marker legend used on images:  Triangle = Palpable lump  [place] = Skin tag or mole  BB = Nipple  Linear vivek = Scar  Square = Pain          Electronically Verified and Signed by Attending Radiologist: Kendra Sánchez MD 7/3/2025 10:36 AM  [facility] [place] [facility] [address] [place] [address]  [phone]    Workstation: [employer]

## 2025-08-18 ENCOUNTER — OFFICE VISIT (OUTPATIENT)
Dept: INTERNAL MEDICINE CLINIC | Facility: CLINIC | Age: 70
End: 2025-08-18

## 2025-08-18 ENCOUNTER — NURSE TRIAGE (OUTPATIENT)
Dept: INTERNAL MEDICINE CLINIC | Facility: CLINIC | Age: 70
End: 2025-08-18

## 2025-08-18 VITALS
WEIGHT: 146 LBS | DIASTOLIC BLOOD PRESSURE: 82 MMHG | HEIGHT: 63 IN | TEMPERATURE: 97 F | SYSTOLIC BLOOD PRESSURE: 118 MMHG | BODY MASS INDEX: 25.87 KG/M2 | OXYGEN SATURATION: 97 % | HEART RATE: 106 BPM

## 2025-08-18 DIAGNOSIS — J45.21 MILD INTERMITTENT ASTHMA WITH EXACERBATION (HCC): Primary | ICD-10-CM

## 2025-08-18 PROCEDURE — 3008F BODY MASS INDEX DOCD: CPT | Performed by: NURSE PRACTITIONER

## 2025-08-18 PROCEDURE — 99214 OFFICE O/P EST MOD 30 MIN: CPT | Performed by: NURSE PRACTITIONER

## 2025-08-18 PROCEDURE — 1126F AMNT PAIN NOTED NONE PRSNT: CPT | Performed by: NURSE PRACTITIONER

## 2025-08-18 PROCEDURE — 3079F DIAST BP 80-89 MM HG: CPT | Performed by: NURSE PRACTITIONER

## 2025-08-18 PROCEDURE — 1159F MED LIST DOCD IN RCRD: CPT | Performed by: NURSE PRACTITIONER

## 2025-08-18 PROCEDURE — 1160F RVW MEDS BY RX/DR IN RCRD: CPT | Performed by: NURSE PRACTITIONER

## 2025-08-18 PROCEDURE — 3074F SYST BP LT 130 MM HG: CPT | Performed by: NURSE PRACTITIONER

## 2025-08-18 RX ORDER — AZELASTINE 1 MG/ML
2 SPRAY, METERED NASAL 2 TIMES DAILY
Qty: 30 ML | Refills: 0 | Status: SHIPPED | OUTPATIENT
Start: 2025-08-18

## 2025-08-18 RX ORDER — BENZONATATE 200 MG/1
200 CAPSULE ORAL 3 TIMES DAILY PRN
Qty: 30 CAPSULE | Refills: 0 | Status: SHIPPED | OUTPATIENT
Start: 2025-08-18

## 2025-08-18 RX ORDER — LORATADINE 10 MG/1
CAPSULE, LIQUID FILLED ORAL
COMMUNITY
Start: 2024-05-01

## 2025-08-18 RX ORDER — PREDNISONE 20 MG/1
20 TABLET ORAL 2 TIMES DAILY
Qty: 10 TABLET | Refills: 0 | Status: SHIPPED | OUTPATIENT
Start: 2025-08-18 | End: 2025-08-23

## 2025-08-19 ENCOUNTER — RESULTS FOLLOW-UP (OUTPATIENT)
Dept: INTERNAL MEDICINE CLINIC | Facility: CLINIC | Age: 70
End: 2025-08-19

## 2025-08-19 LAB — SARS-COV-2 RNA RESP QL NAA+PROBE: NOT DETECTED

## 2025-08-20 ENCOUNTER — TELEPHONE (OUTPATIENT)
Dept: INTERNAL MEDICINE CLINIC | Facility: CLINIC | Age: 70
End: 2025-08-20

## 2025-08-21 ENCOUNTER — HOSPITAL ENCOUNTER (OUTPATIENT)
Age: 70
Discharge: HOME | End: 2025-08-21

## 2025-08-21 ENCOUNTER — APPOINTMENT (OUTPATIENT)
Dept: GENERAL RADIOLOGY | Age: 70
End: 2025-08-21
Attending: NURSE PRACTITIONER

## 2025-08-21 VITALS
OXYGEN SATURATION: 96 % | DIASTOLIC BLOOD PRESSURE: 52 MMHG | RESPIRATION RATE: 16 BRPM | TEMPERATURE: 99 F | HEART RATE: 85 BPM | SYSTOLIC BLOOD PRESSURE: 139 MMHG

## 2025-08-21 DIAGNOSIS — J06.9 VIRAL URI: Primary | ICD-10-CM

## 2025-08-21 PROCEDURE — 99204 OFFICE O/P NEW MOD 45 MIN: CPT

## 2025-08-21 PROCEDURE — 99213 OFFICE O/P EST LOW 20 MIN: CPT

## 2025-08-21 PROCEDURE — 71046 X-RAY EXAM CHEST 2 VIEWS: CPT | Performed by: NURSE PRACTITIONER

## 2025-08-22 RX ORDER — ESTRADIOL 10 UG/1
10 TABLET, FILM COATED VAGINAL
Qty: 40 TABLET | Refills: 3 | OUTPATIENT
Start: 2025-08-22

## (undated) DIAGNOSIS — Z20.822 ENCOUNTER FOR SCREENING LABORATORY TESTING FOR COVID-19 VIRUS: Primary | ICD-10-CM

## (undated) DEVICE — Device: Brand: DEFENDO AIR/WATER/SUCTION AND BIOPSY VALVE

## (undated) DEVICE — 6 ML SYRINGE LUER-LOCK TIP: Brand: MONOJECT

## (undated) DEVICE — HEX-LOCKING BLADE ELECTRODE: Brand: EDGE

## (undated) DEVICE — GOWN SURG AERO BLUE PERF LG

## (undated) DEVICE — GAMMEX® PI HYBRID SIZE 7, STERILE POWDER-FREE SURGICAL GLOVE, POLYISOPRENE AND NEOPRENE BLEND: Brand: GAMMEX

## (undated) DEVICE — FORCEP RADIAL JAW 4

## (undated) DEVICE — 35 ML SYRINGE REGULAR TIP: Brand: MONOJECT

## (undated) DEVICE — SOL  .9 1000ML BTL

## (undated) DEVICE — DRAPE SRG 50X36IN HD TRBN STRL

## (undated) DEVICE — SUTURE ETHILON 5-0 698G

## (undated) DEVICE — OUTPATIENT: Brand: MEDLINE INDUSTRIES, INC.

## (undated) DEVICE — 3M™ STERI-STRIP™ REINFORCED ADHESIVE SKIN CLOSURES, R1547, 1/2 IN X 4 IN (12 MM X 100 MM), 6 STRIPS/ENVELOPE: Brand: 3M™ STERI-STRIP™

## (undated) DEVICE — STERILE LATEX POWDER-FREE SURGICAL GLOVESWITH NITRILE COATING: Brand: PROTEXIS

## (undated) DEVICE — Device: Brand: CUSTOM PROCEDURE KIT

## (undated) DEVICE — LINE MNTR ADLT SET O2 INTMD

## (undated) DEVICE — SUTURE SILK 4-0 P-3

## (undated) DEVICE — MEDI-VAC NON-CONDUCTIVE SUCTION TUBING 6MM X 1.8M (6FT.) L: Brand: CARDINAL HEALTH

## (undated) DEVICE — SUTURE VICRYL 4-0 J494G

## (undated) DEVICE — 3 ML SYRINGE LUER-LOCK TIP: Brand: MONOJECT

## (undated) DEVICE — PEN 6\" SURGICAL MARKING PURPL

## (undated) NOTE — LETTER
06/01/21        [address]very [id number]      Dear Liliana Kaufman,    [facility] records indicate that you have outstanding lab work and or testing that was ordered for you and has not yet been completed:  Orders Placed This Encounter

## (undated) NOTE — Clinical Note
Thank you for the consult. I saw  In the endocrine/diabetes clinic today. Please see attached my note. Please feel free to contact me with any questions. Thanks!

## (undated) NOTE — LETTER
20      Patient: Jasen Hendricks  : 1955 Visit date: 2020    Dear Rob Guaman,      I examined your patient in consultation today. She has a nodular basal cell carcinoma of the right chin.   We will plan on wide excision und

## (undated) NOTE — LETTER
03/11/19        [address] [place] [id number]      Dear Jason,    [facility] records indicate that you have outstanding lab work and or testing that was ordered for you and has not yet been completed:  Orders Placed This Encounter

## (undated) NOTE — LETTER
AUTHORIZATION FOR SURGICAL OPERATION OR OTHER PROCEDURE    1. I hereby authorize TISHA Moy, and [place] [facility] [facility] [facility] staff assigned to my case to perform the following operation and/or procedure at the [place] [employer], [facility]:  [address]  _______________________________________________________________________________________________      _______________________________________________________________________________________________    2. My physician has explained the nature and purpose of the operation or other procedure, possible alternative methods of treatment, the risks involved, and the possibility of complication to me. I acknowledge that no guarantee has been made as to the result that may be obtained. 3.  I recognize that, during the course of this operation, or other procedure, unforseen conditions may necessitate additional or different procedure than those listed above. I, therefore, further authorize and request that the above named physician, his/her physician assistants or designees perform such procedures as are, in his/her professional opinion, necessary and desirable. 4.  Any tissue or organs removed in the operation or other procedure may be disposed of by and at the discretion of the [facility] and [facility]. 5.  I understand that in the event of a medical emergency, I will be transported by local paramedics to [facility] or other hospital emergency department. 6.  I certify that I have read and fully understand the above consent to operation and/or other procedure. 7.  I acknowledge that my physician has explained sedation/analgesia administration to me including the risks and benefits. I consent to the administration of sedation/analgesia as may be necessary or desirable in the judgement of my physician.     Witness signature: ___________________________________________________ Date:  ______/______/_____                    Time:  ________ A.M.  P.M. Patient Name:  ______________________________________________________  (please print)      Patient signature:  ___________________________________________________             Relationship to Patient:           []  Parent    Responsible person                          []  Spouse  In case of minor or                    [] Other  _____________   Incompetent name:  __________________________________________________                               (please print)      _____________      Responsible person  In case of minor or  Incompetent signature:  _______________________________________________    Statement of Physician  My signature below affirms that prior to the time of the procedure, I have explained to the patient and/or his/her guardian, the risks and benefits involved in the proposed treatment and any reasonable alternative to the proposed treatment. I have also explained the risks and benefits involved in the refusal of the proposed treatment and have answered the patient's questions.                         Date:  ______/______/_______  Provider                      Signature:  __________________________________________________________       Time:  ___________ A.M    P.M.

## (undated) NOTE — Clinical Note
Thank you for the consult. I saw Ms. Regina Shipley in the endocrine/diabetes clinic today. Please see attached my note. Please feel free to contact me with any questions. Thanks!

## (undated) NOTE — LETTER
ASTHMA ACTION PLAN for Keaton Abbasi     : 1955     Date: 17  Doctor:  Elaine Garvin MD  Phone for doctor or clinic: [facility], [facility], [address] [place] [address], [address]  [address] [place] [address]  [phone]      ACT Score: Continue Controller Medications But ADD:   Medicine not helping  Breathing is hard and fast  Nose opens wide  Can't walk  Ribs show  Can't talk well Medicine How much to take When to take it    If your symptoms do not improve in ONE hour -  go to the emerg